# Patient Record
Sex: FEMALE | Race: BLACK OR AFRICAN AMERICAN | Employment: UNEMPLOYED | URBAN - METROPOLITAN AREA
[De-identification: names, ages, dates, MRNs, and addresses within clinical notes are randomized per-mention and may not be internally consistent; named-entity substitution may affect disease eponyms.]

---

## 2017-05-30 ENCOUNTER — HOSPITAL ENCOUNTER (OUTPATIENT)
Dept: PHYSICAL THERAPY | Age: 57
Discharge: HOME | End: 2017-05-30
Payer: COMMERCIAL

## 2017-05-30 PROCEDURE — 97110 THERAPEUTIC EXERCISES: CPT

## 2017-05-30 PROCEDURE — 97162 PT EVAL MOD COMPLEX 30 MIN: CPT

## 2017-05-30 NOTE — PROGRESS NOTES
PT DAILY TREATMENT NOTE/CERVICAL EVAL3-16    Patient Name: Luciano Koch  Date:2017  : 1960  [x]  Patient  Verified  Payor: Juany Johansen / Plan: [employer] / Product Type: HMO /    In time:1210 Out time:1252  Total Treatment Time (min): 42  Total Timed Codes (min): 8  1:1 Treatment Time ( only): 8   Visit #: 1 of 10    Treatment Area: Right arm pain [M79.601]  Right hand pain [M79.641]    SUBJECTIVE  Pain Level (0-10 scale): 0 pain, however CCO R hand weakness  []constant []intermittent []improving [x]worsening []no change since onset    Any medication changes, allergies to medications, adverse drug reactions, diagnosis change, or new procedure performed?: [x] No    [] Yes (see summary sheet for update)  Subjective functional status/changes:     PLOF:I  Limitations to PLOF: decreased strength in the R hand  Mechanism of Injury:insideous onset, approx 1 month ago. 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 Current symptoms/Complaints: 63 YO female diagnosed as above and with S/S consistent with above diagnosis presents to skilled outpatient PT. CCO no strength in the R hand and she is R hand dominant. She reports CCO tingling /burning in the R arm posteriorly along the tricep to the hand, with CCO no strength and weak in the hand. Numbness in the digits 4 and 5. She reports insideous onset, approx 1 month ago. 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 Overall worsening since onset. Notes with overhead elevation it does improve the numbness and tingling. Previous Treatment/Compliance: PCP, neurologist, now PT. She has no diagnostics done and has an EMG/NCV pending 17 to rule out ulnar nerve V. C8 radiculopathy.   PMHx/Surgical Hx: - Csp, + DM, + HTN  Work Hx: full time for [employer] and is a  and needs to tolerate working with the children  Living Situation:lives in a 1 story house with front steps 4 and with a rail, lives with family  Pt Goals: no pain irritation , wish to accomplish using right hand  Barriers: []pain []financial []time []transportation [x]other weakness  Motivation: high  Substance use: []Alcohol []Tobacco []other:   FABQ Score: []low []elevate  Cognition: A & O x 4  Other:    OBJECTIVE/EXAMINATION  Domestic Life:needs to tolerate work, household chores  Activity/Recreational Limitations: weakness  Mobility: I   Self Care: I some difficulty due to R hand weakness        Modality rationale:     Min Type Additional Details    [] Estim:  []Unatt       []IFC  []Premod                        []Other:  []w/ice   []w/heat  Position:  Location:    [] Estim: []Att    []TENS instruct  []NMES                    []Other:  []w/US   []w/ice   []w/heat  Position:  Location:    []  Traction: [] Cervical       []Lumbar                       [] Prone          []Supine                       []Intermittent   []Continuous Lbs:  [] before manual  [] after manual    []  Ultrasound: []Continuous   [] Pulsed                           []1MHz   []3MHz Location:  W/cm2:    []  Iontophoresis with dexamethasone         Location: [] Take home patch   [] In clinic    []  Ice     []  heat  []  Ice massage  []  Laser   []  Anodyne Position:  Location:    []  Laser with stim  []  Other: Position:  Location:    []  Vasopneumatic Device Pressure:       [] lo [] med [] hi   Temperature: [] lo [] med [] hi   [] Skin assessment post-treatment:  []intact []redness- no adverse reaction    []redness  adverse reaction:     34 min [x]Eval                  []Re-Eval       8 min Therapeutic Exercise:  [x] See flow sheet :   Rationale: increase ROM, increase strength and improve coordination to improve the patients ability to aid with increase tolerance to ADLS and activities     min Therapeutic Activity:  []  See flow sheet :   Rationale: to improve the patients ability to       min Neuromuscular Re-education:  []  See flow sheet :   Rationale:   to improve the patients ability to      min Manual Therapy:     Rationale:  to      min Gait Training:  ___ feet with ___ device on level surfaces with ___ level of assist   Rationale: With   [] TE   [] TA   [] neuro   [] other: Patient Education: [x] Review HEP    [] Progressed/Changed HEP based on:   [] positioning   [] body mechanics   [] transfers   [] heat/ice application    [] other:      Other Objective/Functional Measures:     Physical Therapy Evaluation Cervical Spine     SUBJECTIVE  Chief Complaint:    Mechanism of injury:    Symptoms  Aggravated by:   [] Bending [] Sitting [] Standing [] Reaching Overhead   [] Moving [] Cough [] Sneeze [] Eating   [] AM  [] PM  Lying:  [] sup   [] pro   [] sidelying   [] Other:     Eased by:    [] Bending [] Sitting [] Standing Lying: [] sup  [] pro  [] sidelying   [] Moving [] AM  [] PM  [] Other:     General Health:  Red Flags Indicated? [] Yes    [] No  [] Yes [] No Recent weight change (If yes, due to dieting? [] Yes  [] No)   [] Yes [] No Persistent cough  [] Yes [] No Unremitting pain at night  [] Yes [] No Dizziness  [] Yes [] No Blurred vision  [] Yes [] No Hands more cold or painful in cold weather  [] Yes [] No Ringing in ears  [] Yes [] No Difficulty swallowing  [] Yes [] No Dysfunction of bowel or bladder  [] Yes [] No Recent illness within past 3 weeks (i.e, cold, flu)  [] Yes [] No Jaw pain    Past History/Treatments:    Diagnostic Tests: [] Lab work [] X-rays    [] CT [] MRI     [] Other:  Results:    Functional Status  Prior level of function:  Present functional limitations:  What position do you sleep in?:    Headaches: Do you have headaches? [] Yes   [x] No  How often do you get headaches? How long does the headache last?  What aggravates it? What relieves it?   Does the headache coincide with any other symptoms (visual disturbances, light sensitivity)? Where is the headache? Does it change locations?   Other:    OBJECTIVE  Posture: [] WNL  Head Position:mild FH and RS  Shoulder/Scapular Position:  C-Kyphosis:  [] increased   [] decreased   C-Lordosis:   [] increased   [] decreased  T-Kyphosis:  [] increased   [] decreased  T-Lordosis:   [] increased   [] decreased     TMJ: [x] N/A [] Abnormal - ROM:   Palpation:    Cervical Retraction: [x] WNL    [] Abnormal:  NBNW re: R hand and UE involvement  Shoulder/Scapular Screen: [] WNL    [] Abnormal:    Active Movements: [] N/A   [] Too acute   [] Other:  ROM  AROM % PROM Comments:pain, area   Forward flexion 25     Extension 30     SB right 32     SB left  40     Rotation right 50     Rotation left 55       Thoracic Spine: [x] N/A    [] WNL   [] Other:    AROM B Shoulders full overhead    R  SHOULDER F 150            MMT   F  R/L 4+/5       ABD  R/L 4+/5    Palpation:  [] Min  [] Mod  [] Severe    Location:  [] Min  [] Mod  [] Severe    Location:  [] Min  [] Mod  [] Severe    Location:    Neuro Screen (myotome/dematome/felexes): [] WNL  Myotome Level Muscle Test Myotome Level Muscle Test   C5 Shoulder Adduction - Deltoid C8 Finger Flexors   C6 Wrist Extension T1 Finger Abduction - Interossei   C7 Elbow Extension     Comments:  Upper Limb Tension Tests: [] N/A       Ulnar: [] R    [] L    [] +    [] -       Median: [] R    [] L    [] +    [] -       Radial: [] R    [] L    [] +    [] -    Special Tests:  Cervical:        Vertebral Artery:  [] R    [] L    [] +    [] -       Alar Ligament: [] R    [] L    [] +    [] -       Transverse Lig: [] R    [] L    [] +    [] -       Spurling's:  [] R    [] L    [] +    [] -       Distraction: [] R    [] L    [x] +    [] -      BAR       Compression: [] R    [] L    [x] +    [] -      WAR    Thoracic Outlet Tests: [] N/A       Adson's:  [] R    [] L    [] +    [] -       Hyperabduction: [] R    [] L    [] +    [] -       Maurice's:  [] R [] L    [] +    [] -       Orvilla Pean:  [] R    [] L    [] +    [] -    Diaphragmatic Breathing: [] Normal    [] Abnormal    Muscle Flexibility: [] N/A   Scalenes: [] WNL    [] Tight    [] R    [] L   Upper Trap: [] WNL    [] Tight    [] R    [] L   Levator: [] WNL    [] Tight    [] R    [] L   Pect. Minor: [] WNL    [] Tight    [] R    [] L    Global Muscular Weakness: [] N/A   Lower Trap:   Rhomboids:   Middle Trap:   Serratus Ant:   Ext Rotators: Other:    Other tests/comments:     In pounds    Level 2  R   12   16  15   avg 14                        L  34  29 36      avg 33    Pain Level (0-10 scale) post treatment: 0    ASSESSMENT/Changes in Function: Patient demonstrates the potential to make gains with improved ROM, strength, endurance/activity tolerance, functional FOTO survey score   and all within a reasonable time frame so as to increase their functional independence with ADLs and activities for carryover to  Improved quality of life, work demands, household chores. Patient requires skilled Physical Therapy so as to monitor their response to and modify their treatment plan accordingly. Patient appears to be an appropriate candidate for skilled outpatient Physical Therapy. Patient will continue to benefit from skilled PT services to modify and progress therapeutic interventions, address functional mobility deficits, address ROM deficits, address strength deficits, analyze and address soft tissue restrictions, analyze and cue movement patterns, analyze and modify body mechanics/ergonomics, assess and modify postural abnormalities and instruct in home and community integration to attain remaining goals.      [x]  See Plan of Care  []  See progress note/recertification  []  See Discharge Summary         Progress towards goals / Updated goals:       PLAN  [x]  Upgrade activities as tolerated     [x]  Continue plan of care  []  Update interventions per flow sheet       []  Discharge due to:_  []  Other:_ Prerna Rea, PT 5/30/2017  12:14 PM

## 2017-05-30 NOTE — PROGRESS NOTES
[facility]  [address] [facility], [address] [address],[place]  [phone] fax    Plan of Care/ Statement of Necessity for Physical Therapy Services    Patient name: Warren Hook Start of Care: 2017   Referral source: Fay Price MD : 1960    Medical Diagnosis: Right arm pain [M79.601]  Right hand pain [M79.641]   Onset Date:1 month    Treatment Diagnosis: decrease tolerance to ADLS and activities due to R UE tingling/ burning R UE and numbness/weakness in the R hand   Prior Hospitalization: see medical history Provider#: [id number]   Medications: Verified on Patient summary List    Comorbidities: DM, HTN   Prior Level of Function:  I all areas, no AD use, 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 Assessment/ key information: 63 YO female diagnosed as above and with S/S consistent with above diagnosis presents to skilled outpatient PT. CCO no strength in the R hand and she is R hand dominant. She reports CCO tingling /burning in the R arm posteriorly along the tricep to the hand, with CCO no strength and weak in the hand. Numbness in the digits 4 and 5. She reports insideous onset, approx 1 month ago. 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 Overall worsening since onset. Notes with overhead elevation it does improve the numbness and tingling. Previous Treatment/Compliance: PCP, neurologist, now PT. She has no diagnostics done and has an EMG/NCV pending 17 to rule out ulnar nerve V. C8 radiculopathy. Pain 0, CCO tingling /burning in the R arm posteriorly along the tricep to the hand, with CCO no strength and weak in the hand. Numbness in the digits 4 and 5. Mild FH and RS, AROM Csp FF 25, E 30, SB R/L 32/40  ROT R/L 50/55.  Csp Retraction NBNW re R hand and UE involvement. AROM B Shoulders full overhead R  SHOULDER F 150  MMT F R/L 4+/5 ABD R/L 4+/5. Csp Compression WAR and Distraction BAR. Patient demonstrates the potential to make gains with improved ROM, strength, endurance/activity tolerance, functional FOTO survey score and all within a reasonable time frame so as to increase their functional independence with ADLs and activities for carryover to Improved quality of life, work demands, household chores. Patient requires skilled Physical Therapy so as to monitor their response to and modify their treatment plan accordingly. Patient appears to be an appropriate candidate for skilled outpatient Physical Therapy.        Evaluation Complexity History MEDIUM  Complexity : 1-2 comorbidities / personal factors will impact the outcome/ POC ; Examination HIGH Complexity : 4+ Standardized tests and measures addressing body structure, function, activity limitation and / or participation in recreation  ;Presentation MEDIUM Complexity : Evolving with changing characteristics  ; Clinical Decision Making MEDIUM Complexity : FOTO score of 26-74  Overall Complexity Rating: MEDIUM  Problem List: decrease ROM, decrease strength, decrease ADL/ functional abilitiies, decrease activity tolerance and other FOTO 32   Treatment Plan may include any combination of the following: Therapeutic exercise, Therapeutic activities, Neuromuscular re-education, Physical agent/modality, Manual therapy and Patient education  Patient / Family readiness to learn indicated by: asking questions, trying to perform skills and interest  Persons(s) to be included in education: patient (P)  Barriers to Learning/Limitations: None  Patient Goal (s): no pain irritation , wish to accomplish using right hand  Patient Self Reported Health Status: fair  Rehabilitation Potential: good    Short Term Goals:  To be accomplished in 5 treatments:   1 patient will have established and be independent with HEP to aid with progression of skilled PT program   EVAL issued   CURRENT   2 patient will report 10-20% overall improvement with her involvement to aid with increase tolerance to ADLs and activities   EVAL C/O numbness/ weakness R hand and burning/ tingling R tricep   CURRENT   3 patient will have FOTO improved to 42 to show increase function and use of R UE with ADLS and work demands   EVAL 32   CURRENT  Long Term Goals: To be accomplished in 10 treatments:   1 patient will report 50% overall improvement with her involvement to aid with increase tolerance to ADLs and activities   EVAL C/O numbness/ weakness R hand and burning/ tingling R tricep   CURRENT   2 patient will have FOTO improved to 52 to show increase function and use of R UE with ADLS and work demands   EVAL 32   CURRENT   3 patient will have improved R  to 20# to aid with increase use of R UE with ADLs and work demands   EVAL 14#   CURRENT      Frequency / Duration: Patient to be seen 2-3 times per week for 10 treatments. Patient/ Caregiver education and instruction: Diagnosis, prognosis, self care, activity modification and exercises   [x]  Plan of care has been reviewed with CLOVER Tirado, PT 5/30/2017 12:42 PM  ________________________________________________________________________    I certify that the above Therapy Services are being furnished while the patient is under my care. I agree with the treatment plan and certify that this therapy is necessary.     Physician's Signature:____________________  Date:____________Time: _________    Please sign and return to [facility] [address] [place] [address], [address] [place] [address],[place] [address]  [phone] fax

## 2017-06-01 ENCOUNTER — HOSPITAL ENCOUNTER (OUTPATIENT)
Dept: PHYSICAL THERAPY | Age: 57
Discharge: HOME | End: 2017-06-01
Payer: COMMERCIAL

## 2017-06-01 PROCEDURE — 97140 MANUAL THERAPY 1/> REGIONS: CPT

## 2017-06-01 PROCEDURE — 97110 THERAPEUTIC EXERCISES: CPT

## 2017-06-01 NOTE — PROGRESS NOTES
PT DAILY TREATMENT NOTE - [facility]     Patient Name: Shorty Joshi  Date:2017  : 1960  [x]  Patient  Verified  Payor: Salvador Pizarro / Plan: [employer] / Product Type: HMO /    In time: 1:57  Out time:2:43  Total Treatment Time (min): 42  Visit #: 2 of 10    Treatment Area: Right arm pain [M79.601]  Right hand pain [M79.641]    SUBJECTIVE  Pain Level (0-10 scale): 0  Any medication changes, allergies to medications, adverse drug reactions, diagnosis change, or new procedure performed?: [x] No    [] Yes (see summary sheet for update)  Subjective functional status/changes:   [] No changes reported  Tingling  (R)  Hand. No pain.     OBJECTIVE    Modality rationale: decrease edema, decrease inflammation, decrease pain and increase tissue extensibility to improve the patients ability to perform ADL   Min Type Additional Details    [] Estim:  []Unatt       []IFC  []Premod                        []Other:  []w/ice   []w/heat  Position:  Location:    [] Estim: []Att    []TENS instruct  []NMES                    []Other:  []w/US   []w/ice   []w/heat  Position:  Location:    []  Traction: [] Cervical       []Lumbar                       [] Prone          []Supine                       []Intermittent   []Continuous Lbs:  [] before manual  [] after manual    []  Ultrasound: []Continuous   [] Pulsed                           []1MHz   []3MHz W/cm2:  Location:    []  Iontophoresis with dexamethasone         Location: [] Take home patch   [] In clinic    []  Ice     []  heat  []  Ice massage  []  Laser   []  Anodyne Position:  Location:    []  Laser with stim  []  Other:  Position:  Location:    []  Vasopneumatic Device Pressure:       [] lo [] med [] hi   Temperature: [] lo [] med [] hi   [x] Skin assessment post-treatment:  [x]intact []redness- no adverse reaction    []redness  adverse reaction:      min []Eval                  []Re-Eval       32 min Therapeutic Exercise:  [x] See flow sheet : Rationale: increase ROM and increase strength to improve the patients ability to perform ADL     min Therapeutic Activity:  []  See flow sheet :   Rationale:   to improve the patients ability to       min Neuromuscular Re-education:  []  See flow sheet :   Rationale:   to improve the patients ability to     10 min Manual Therapy:  CSP  TX  With passive  Stretch  (B)UT   Rationale: decrease pain, increase ROM, increase tissue extensibility and decrease edema  to perform ADL      min Gait Training:  ___ feet with ___ device on level surfaces with ___ level of assist   Rationale: With   [x] TE   [] TA   [] neuro   [] other: Patient Education: [x] Review HEP    [] Progressed/Changed HEP based on:   [] positioning   [] body mechanics   [] transfers   [] heat/ice application    [] other:      Other Objective/Functional Measures:  Tingling  (R) hand  Was resolved  Following treatment. Pain Level (0-10 scale) post treatment: 0    ASSESSMENT/Changes in Function: Fair  Response  To each there ex. Discussed  With  Pt on importance  Of  Performing  HEP. Patient will continue to benefit from skilled PT services to address functional mobility deficits, address ROM deficits, address strength deficits, analyze and address soft tissue restrictions, analyze and cue movement patterns and instruct in home and community integration to attain remaining goals. [x]  See Plan of Care  []  See progress note/recertification  []  See Discharge Summary         Progress towards goals / Updated goals:  Short Term Goals:  To be accomplished in 5 treatments:  1 patient will have established and be independent with HEP to aid with progression of skilled PT program  EVAL issued  CURRENT progressing  6/1/17  2 patient will report 10-20% overall improvement with her involvement to aid with increase tolerance to ADLs and activities  EVAL C/O numbness/ weakness R hand and burning/ tingling R tricep  CURRENT  3 patient will have FOTO improved to 42 to show increase function and use of R UE with ADLS and work demands  EVAL 32  CURRENT  Long Term Goals:  To be accomplished in 10 treatments:  1 patient will report 50% overall improvement with her involvement to aid with increase tolerance to ADLs and activities  EVAL C/O numbness/ weakness R hand and burning/ tingling R tricep  CURRENT  2 patient will have FOTO improved to 52 to show increase function and use of R UE with ADLS and work demands  EVAL 32  CURRENT  3 patient will have improved R  to 20# to aid with increase use of R UE with ADLs and work demands  EVAL 14#  CURRENT    PLAN  []  Upgrade activities as tolerated     [x]  Continue plan of care  []  Update interventions per flow sheet       []  Discharge due to:_  []  Other:_      Marleny García, PTA 6/1/2017  1:54 PM    Future Appointments  Date Time Provider Brenda Randle   6/1/2017 2:30 PM Crystal Say, PTA [facility]   6/5/2017 9:00 AM [facility] RM 1 [facility]   6/7/2017 2:30 PM Crystal Say, PTA [facility]   6/8/2017 2:00 PM Crystal Say, PTA [facility]   6/13/2017 3:00 PM Kelsy Lions, PT [facility]   6/15/2017 2:30 PM Kelsy Lions, PT [facility]   6/19/2017 2:30 PM [facility]els[facility] Lions, PT [facility]   6/22/2017 3:00 PM Kelsy Lions, PT [facility]   6/26/2017 2:30 PM Eklsy Lions, PT [facility]   6/29/2017 3:00 PM Kelsy Lions, PT [facility]

## 2017-06-05 ENCOUNTER — HOSPITAL ENCOUNTER (OUTPATIENT)
Age: 57
Discharge: HOME | End: 2017-06-05
Attending: PSYCHIATRY & NEUROLOGY
Payer: COMMERCIAL

## 2017-06-05 DIAGNOSIS — M54.12 CERVICAL RADICULOPATHY AT C8: ICD-10-CM

## 2017-06-05 PROCEDURE — 72141 MRI NECK SPINE W/O DYE: CPT

## 2017-06-07 ENCOUNTER — HOSPITAL ENCOUNTER (OUTPATIENT)
Dept: PHYSICAL THERAPY | Age: 57
Discharge: HOME | End: 2017-06-07
Payer: COMMERCIAL

## 2017-06-07 PROCEDURE — 97110 THERAPEUTIC EXERCISES: CPT

## 2017-06-07 PROCEDURE — 97140 MANUAL THERAPY 1/> REGIONS: CPT

## 2017-06-07 NOTE — PROGRESS NOTES
PT DAILY TREATMENT NOTE - [facility]     Patient Name: Colin Avery  Date:2017  : 1960  [x]  Patient  Verified  Payor: Maria Luz Katie / Plan: [employer] / Product Type: HMO /    In time: 2:14  Out time:3:15  Total Treatment Time (min): 42  Visit #: 3 of 10    Treatment Area: Right arm pain [M79.601]  Right hand pain [M79.641]    SUBJECTIVE  Pain Level (0-10 scale): 0  Any medication changes, allergies to medications, adverse drug reactions, diagnosis change, or new procedure performed?: [x] No    [] Yes (see summary sheet for update)  Subjective functional status/changes:   [] No changes reported  Numbness.     OBJECTIVE    Modality rationale: decrease edema, decrease inflammation, decrease pain and increase tissue extensibility to improve the patients ability to perform ADL    Min Type Additional Details    [] Estim:  []Unatt       []IFC  []Premod                        []Other:  []w/ice   []w/heat  Position:  Location:    [] Estim: []Att    []TENS instruct  []NMES                    []Other:  []w/US   []w/ice   []w/heat  Position:  Location:    []  Traction: [] Cervical       []Lumbar                       [] Prone          []Supine                       []Intermittent   []Continuous Lbs:  [] before manual  [] after manual    []  Ultrasound: []Continuous   [] Pulsed                           []1MHz   []3MHz W/cm2:  Location:    []  Iontophoresis with dexamethasone         Location: [] Take home patch   [] In clinic    []  Ice     []  heat  []  Ice massage  []  Laser   []  Anodyne Position:  Location:    []  Laser with stim  []  Other:  Position:  Location:    []  Vasopneumatic Device Pressure:       [] lo [] med [] hi   Temperature: [] lo [] med [] hi   [x] Skin assessment post-treatment:  [x]intact []redness- no adverse reaction    []redness  adverse reaction:      min []Eval                  []Re-Eval       30 min Therapeutic Exercise:  [x] See flow sheet :   Rationale: increase ROM and increase strength to improve the patients ability to perform ADL       min Therapeutic Activity:  []  See flow sheet :   Rationale:   to improve the patients ability to       min Neuromuscular Re-education:  []  See flow sheet :   Rationale:   to improve the patients ability to     12 min Manual Therapy:  CSP TX  With passive  Stretch  (B) UT   Rationale: decrease pain, increase ROM, increase tissue extensibility and decrease edema  to perform ADL     min Gait Training:  ___ feet with ___ device on level surfaces with ___ level of assist   Rationale: With   [x] TE   [] TA   [] neuro   [] other: Patient Education: [x] Review HEP    [] Progressed/Changed HEP based on:   [] positioning   [] body mechanics   [] transfers   [] heat/ice application    [] other:      Other Objective/Functional Measures:  Pt  Reports  Numbness  Is  Still  The same. Pain Level (0-10 scale) post treatment: 0    ASSESSMENT/Changes in Function: Fair  Response  To each there ex. Numbness  And  Tingling  Was resolved  Following  Treatment. Patient will continue to benefit from skilled PT services to address functional mobility deficits, address ROM deficits, address strength deficits, analyze and address soft tissue restrictions, analyze and cue movement patterns and instruct in home and community integration to attain remaining goals.      [x]  See Plan of Care  []  See progress note/recertification  []  See Discharge Summary         Progress towards goals / Updated goals:  1 patient will have established and be independent with HEP to aid with progression of skilled PT program  EVAL issued  CURRENT progressing 6/1/17  2 patient will report 10-20% overall improvement with her involvement to aid with increase tolerance to ADLs and activities  EVAL C/O numbness/ weakness R hand and burning/ tingling R tricep  CURRENT  3 patient will have FOTO improved to 42 to show increase function and use of R UE with ADLS and work demands  EVAL 32  CURRENT  Long Term Goals:  To be accomplished in 10 treatments:  1 patient will report 50% overall improvement with her involvement to aid with increase tolerance to ADLs and activities  EVAL C/O numbness/ weakness R hand and burning/ tingling R tricep  CURRENT  Progressing  6/7/17  2 patient will have FOTO improved to 52 to show increase function and use of R UE with ADLS and work demands  EVAL 32  CURRENT  3 patient will have improved R  to 20# to aid with increase use of R UE with ADLs and work demands  EVAL 14#  CURRENT    PLAN  []  Upgrade activities as tolerated     [x]  Continue plan of care  []  Update interventions per flow sheet       []  Discharge due to:_  []  Other:_      Marleny García PTA 6/7/2017  2:19 PM    Future Appointments  Date Time Provider Brenda Randle   6/7/2017 2:30 PM Payton Jose PTA [facility]   6/8/2017 2:00 PM Payton Deal PTA [facility]   6/13/2017 3:00 PM Arron Smart, PT [facility]   6/15/2017 2:30 PM Arron Smart, PT [facility]   6/19/2017 2:30 PM Arron Smart, PT [facility]   6/22/2017 3:00 PM Arron Smart, PT [facility]   6/26/2017 2:30 PM Arron Smart, PT [facility]   6/29/2017 3:00 PM Arron Smart, PT [facility]

## 2017-06-08 ENCOUNTER — HOSPITAL ENCOUNTER (OUTPATIENT)
Dept: PHYSICAL THERAPY | Age: 57
Discharge: HOME | End: 2017-06-08
Payer: COMMERCIAL

## 2017-06-08 PROCEDURE — 97140 MANUAL THERAPY 1/> REGIONS: CPT

## 2017-06-08 PROCEDURE — 97110 THERAPEUTIC EXERCISES: CPT

## 2017-06-08 NOTE — PROGRESS NOTES
PT DAILY TREATMENT NOTE - [facility]     Patient Name: Dk Yancey  Date:2017  : 1960  [x]  Patient  Verified  Payor: Franklin Turner / Plan: [employer] HMO / Product Type: HMO /    In time:2:00  Out time:2:45  Total Treatment Time (min): 40    Visit #: 4 of 10    Treatment Area: Right arm pain [M79.601]  Right hand pain [M79.641]    SUBJECTIVE  Pain Level (0-10 scale): 0  Any medication changes, allergies to medications, adverse drug reactions, diagnosis change, or new procedure performed?: [x] No    [] Yes (see summary sheet for update)  Subjective functional status/changes:   [] No changes reported  No pain . Little  Numbness.     OBJECTIVE    Modality rationale: decrease edema, decrease inflammation, decrease pain and increase tissue extensibility to improve the patients ability to perform ADL   Min Type Additional Details    [] Estim:  []Unatt       []IFC  []Premod                        []Other:  []w/ice   []w/heat  Position:  Location:    [] Estim: []Att    []TENS instruct  []NMES                    []Other:  []w/US   []w/ice   []w/heat  Position:  Location:    []  Traction: [] Cervical       []Lumbar                       [] Prone          []Supine                       []Intermittent   []Continuous Lbs:  [] before manual  [] after manual    []  Ultrasound: []Continuous   [] Pulsed                           []1MHz   []3MHz W/cm2:  Location:    []  Iontophoresis with dexamethasone         Location: [] Take home patch   [] In clinic    []  Ice     []  heat  []  Ice massage  []  Laser   []  Anodyne Position:  Location:    []  Laser with stim  []  Other:  Position:  Location:    []  Vasopneumatic Device Pressure:       [] lo [] med [] hi   Temperature: [] lo [] med [] hi   [x] Skin assessment post-treatment:  [x]intact []redness- no adverse reaction    []redness  adverse reaction:      min []Eval                  []Re-Eval       28 min Therapeutic Exercise:  [x] See flow sheet : Rationale: increase ROM and increase strength to improve the patients ability to perform ADL      min Therapeutic Activity:  []  See flow sheet :   Rationale:   to improve the patients ability to       min Neuromuscular Re-education:  []  See flow sheet :   Rationale:   to improve the patients ability to     12 min Manual Therapy:  CSP TX  With passive  Stretch (B) UT   Rationale: decrease pain, increase ROM, increase tissue extensibility and decrease edema  to perform ADL      min Gait Training:  ___ feet with ___ device on level surfaces with ___ level of assist   Rationale: With   [x] TE   [] TA   [] neuro   [] other: Patient Education: [x] Review HEP    [] Progressed/Changed HEP based on:   [] positioning   [] body mechanics   [] transfers   [] heat/ice application    [] other:      Other Objective/Functional Measures:  Gripper  15#    Pain Level (0-10 scale) post treatment: 0    ASSESSMENT/Changes in Function:  Reports  Numbness  Is  5% less at  (R) arm/hand/tricep. Fair  Response  To each there ex. Patient will continue to benefit from skilled PT services to address functional mobility deficits, address ROM deficits, address strength deficits, analyze and address soft tissue restrictions, analyze and cue movement patterns and instruct in home and community integration to attain remaining goals.      [x]  See Plan of Care  []  See progress note/recertification  []  See Discharge Summary         Progress towards goals / Updated goals:  1 patient will have established and be independent with HEP to aid with progression of skilled PT program  EVAL issued  CURRENT progressing 6/1/17  2 patient will report 10-20% overall improvement with her involvement to aid with increase tolerance to ADLs and activities  EVAL C/O numbness/ weakness R hand and burning/ tingling R tricep  CURRENT  3 patient will have FOTO improved to 42 to show increase function and use of R UE with ADLS and work demands  EVAL 28  CURRENT  Long Term Goals:  To be accomplished in 10 treatments:  1 patient will report 50% overall improvement with her involvement to aid with increase tolerance to ADLs and activities  EVAL C/O numbness/ weakness R hand and burning/ tingling R tricep  CURRENT Progressing    5%  6/8/17  2 patient will have FOTO improved to 52 to show increase function and use of R UE with ADLS and work demands  EVAL 32  CURRENT  3 patient will have improved R  to 20# to aid with increase use of R UE with ADLs and work demands  EVAL 14#  CURRENT  PLAN  []  Upgrade activities as tolerated     [x]  Continue plan of care  []  Update interventions per flow sheet       []  Discharge due to:_  [x]  Other:_REASSESS  FOTO NV      [address] [facility] 6/8/2017  2:05 PM    Future Appointments  Date Time Provider Brenda Randle   6/13/2017 3:00 PM Marsha Brewster PT [facility]   6/15/2017 2:30 PM Marsha Brewster, PT [facility]   6/19/2017 2:30 PM Marsha Brewster PT [facility]   6/22/2017 3:00 PM Marsha Brewster PT [facility]   6/26/2017 2:30 PM Marsha Brewster PT [facility]   6/29/2017 3:00 PM Marsha Brewster PT [facility]

## 2017-06-13 ENCOUNTER — HOSPITAL ENCOUNTER (OUTPATIENT)
Dept: PHYSICAL THERAPY | Age: 57
Discharge: HOME | End: 2017-06-13
Payer: COMMERCIAL

## 2017-06-13 PROCEDURE — 97140 MANUAL THERAPY 1/> REGIONS: CPT

## 2017-06-13 PROCEDURE — 97110 THERAPEUTIC EXERCISES: CPT

## 2017-06-13 NOTE — PROGRESS NOTES
PT DAILY TREATMENT NOTE     Patient Name: Leonila Angela  Date:2017  : 1960  [x]  Patient  Verified  Payor: Anthony Holt / Plan: [employer] / Product Type: HMO /    In time:253  Out time:400  Total Treatment Time (min): 47  Visit #:5 of 10    Treatment Area: Right arm pain [M79.601]  Right hand pain [M79.641]    SUBJECTIVE  Pain Level (0-10 scale): numb  Any medication changes, allergies to medications, adverse drug reactions, diagnosis change, or new procedure performed?: [x] No    [] Yes (see summary sheet for update)  Subjective functional status/changes:   [] No changes reported  I am still having numbness in the R arm/hand.     OBJECTIVE    Modality rationale:     Min Type Additional Details    [] Estim:  []Unatt       []IFC  []Premod                        []Other:  []w/ice   []w/heat  Position:  Location:    [] Estim: []Att    []TENS instruct  []NMES                    []Other:  []w/US   []w/ice   []w/heat  Position:  Location:    []  Traction: [] Cervical       []Lumbar                       [] Prone          []Supine                       []Intermittent   []Continuous Lbs:  [] before manual  [] after manual    []  Ultrasound: []Continuous   [] Pulsed                           []1MHz   []3MHz W/cm2:  Location:    []  Iontophoresis with dexamethasone         Location: [] Take home patch   [] In clinic    []  Ice     []  heat  []  Ice massage  []  Laser   []  Anodyne Position:  Location:    []  Laser with stim  []  Other:  Position:  Location:    []  Vasopneumatic Device Pressure:       [] lo [] med [] hi   Temperature: [] lo [] med [] hi   [] Skin assessment post-treatment:  []intact []redness- no adverse reaction    []redness  adverse reaction:       min []Eval                  []Re-Eval       32 min Therapeutic Exercise:  [x] See flow sheet :   Rationale: increase ROM, increase strength and improve coordination to improve the patients ability to aid with increase tolerance to ADLS and activities     min Therapeutic Activity:  []  See flow sheet :   Rationale:   to improve the patients ability to       min Neuromuscular Re-education:  []  See flow sheet :   Rationale:   to improve the patients ability to     15 min Manual Therapy:  CTX ,SOR, Csp PROM   Rationale: increase ROM, increase tissue extensibility and decrease trigger points to aid with increase tolerance to ADLS and activities     min Gait Training:  ___ feet with ___ device on level surfaces with ___ level of assist   Rationale: With   [] TE   [] TA   [] neuro   [] other: Patient Education: [x] Review HEP    [] Progressed/Changed HEP based on:   [] positioning   [] body mechanics   [] transfers   [] heat/ice application    [] other:      Other Objective/Functional Measures: VC exercises and technique      FOTO 41  Pain Level (0-10 scale) post treatment: residual numbness R UE/hand    ASSESSMENT/Changes in Function: she reports benefit from CTx and SOR    Patient will continue to benefit from skilled PT services to modify and progress therapeutic interventions, address functional mobility deficits, address ROM deficits, address strength deficits, analyze and address soft tissue restrictions, analyze and cue movement patterns, analyze and modify body mechanics/ergonomics, assess and modify postural abnormalities and instruct in home and community integration to attain remaining goals.      [x]  See Plan of Care  []  See progress note/recertification  []  See Discharge Summary         Progress towards goals / Updated goals:     1 patient will have established and be independent with HEP to aid with progression of skilled PT program  EVAL issued  CURRENT progressing 6/1/17 6/13/17  2 patient will report 10-20% overall improvement with her involvement to aid with increase tolerance to ADLs and activities  EVAL C/O numbness/ weakness R hand and burning/ tingling R tricep  CURRENT  3 patient will have FOTO improved to 42 to show increase function and use of R UE with ADLS and work demands  EVAL 32  CURRENT   41 6/13/17  Long Term Goals:  To be accomplished in 10 treatments:  1 patient will report 50% overall improvement with her involvement to aid with increase tolerance to ADLs and activities  EVAL C/O numbness/ weakness R hand and burning/ tingling R tricep  CURRENT Progressing 5%  6/8/17  2 patient will have FOTO improved to 52 to show increase function and use of R UE with ADLS and work demands  EVAL 32  CURRENT   41 6/13/17  3 patient will have improved R  to 20# to aid with increase use of R UE with ADLs and work demands  EVAL 14#  [address]  [x]  Upgrade activities as tolerated     [x]  Continue plan of care  []  Update interventions per flow sheet       []  Discharge due to:_  []  Other:_      Laura Bryant, PT 6/13/2017  3:03 PM    Future Appointments  Date Time Provider Brenda Randle   6/15/2017 2:30 PM Laura Ast, PT [facility]   6/19/2017 2:30 PM Laura Ast, PT [facility]   6/22/2017 3:00 PM Payton Jose, PTA [facility]   6/26/2017 2:30 PM Payton Deal, PTA [facility]   6/29/2017 3:00 PM Laura Bryant, PT [facility]

## 2017-06-15 ENCOUNTER — APPOINTMENT (OUTPATIENT)
Dept: PHYSICAL THERAPY | Age: 57
End: 2017-06-15
Payer: COMMERCIAL

## 2017-06-19 ENCOUNTER — HOSPITAL ENCOUNTER (OUTPATIENT)
Dept: PHYSICAL THERAPY | Age: 57
Discharge: HOME | End: 2017-06-19
Payer: COMMERCIAL

## 2017-06-19 PROCEDURE — 97140 MANUAL THERAPY 1/> REGIONS: CPT

## 2017-06-19 PROCEDURE — 97110 THERAPEUTIC EXERCISES: CPT

## 2017-06-19 NOTE — PROGRESS NOTES
PT DAILY TREATMENT NOTE     Patient Name: Checo Wu  Date:2017  : 1960  [x]  Patient  Verified  Payor: Alpha Landau / Plan: [employer] / Product Type: HMO /    In time:226  Out time:315  Total Treatment Time (min): 44  Visit #: 6 of 10    Treatment Area: Right arm pain [M79.601]  Right hand pain [M79.641]    SUBJECTIVE  Pain Level (0-10 scale): numb  Any medication changes, allergies to medications, adverse drug reactions, diagnosis change, or new procedure performed?: [x] No    [] Yes (see summary sheet for update)  Subjective functional status/changes:   [] No changes reported  I am still having the numbness, I go for the EMG this week.     OBJECTIVE    Modality rationale:     Min Type Additional Details    [] Estim:  []Unatt       []IFC  []Premod                        []Other:  []w/ice   []w/heat  Position:  Location:    [] Estim: []Att    []TENS instruct  []NMES                    []Other:  []w/US   []w/ice   []w/heat  Position:  Location:    []  Traction: [] Cervical       []Lumbar                       [] Prone          []Supine                       []Intermittent   []Continuous Lbs:  [] before manual  [] after manual    []  Ultrasound: []Continuous   [] Pulsed                           []1MHz   []3MHz W/cm2:  Location:    []  Iontophoresis with dexamethasone         Location: [] Take home patch   [] In clinic    []  Ice     []  heat  []  Ice massage  []  Laser   []  Anodyne Position:  Location:    []  Laser with stim  []  Other:  Position:  Location:    []  Vasopneumatic Device Pressure:       [] lo [] med [] hi   Temperature: [] lo [] med [] hi   [] Skin assessment post-treatment:  []intact []redness- no adverse reaction    []redness  adverse reaction:      min []Eval                  []Re-Eval       29 min Therapeutic Exercise:  [x] See flow sheet :   Rationale: increase ROM, increase strength and improve coordination to improve the patients ability to aid with increase tolerance to ADLS and activities     min Therapeutic Activity:  []  See flow sheet :   Rationale:   to improve the patients ability to       min Neuromuscular Re-education:  []  See flow sheet :   Rationale:   to improve the patients ability to     15 min Manual Therapy:  CTX, Csp PROM, UT passive stretch, F passive stretch   Rationale: decrease pain, increase ROM and increase tissue extensibility to aid with increase tolerance to ADLs and activities     min Gait Training:  ___ feet with ___ device on level surfaces with ___ level of assist   Rationale: With   [] TE   [] TA   [] neuro   [] other: Patient Education: [x] Review HEP    [] Progressed/Changed HEP based on:   [] positioning   [] body mechanics   [] transfers   [] heat/ice application    [] other:      Other Objective/Functional Measures: VC exercises and technique     Pain Level (0-10 scale) post treatment: 0    ASSESSMENT/Changes in Function: residual numbness in the R UE    Patient will continue to benefit from skilled PT services to modify and progress therapeutic interventions, address functional mobility deficits, address ROM deficits, address strength deficits, analyze and address soft tissue restrictions, analyze and cue movement patterns, analyze and modify body mechanics/ergonomics, assess and modify postural abnormalities and instruct in home and community integration to attain remaining goals.      [x]  See Plan of Care  []  See progress note/recertification  []  See Discharge Summary         Progress towards goals / Updated goals:   1 patient will have established and be independent with HEP to aid with progression of skilled PT program  EVAL issued  CURRENT progressing 6/1/17 6/13/17 6/19/17  2 patient will report 10-20% overall improvement with her involvement to aid with increase tolerance to ADLs and activities  EVAL C/O numbness/ weakness R hand and burning/ tingling R tricep  CURRENT  3 patient will have FOTO improved to 42 to show increase function and use of R UE with ADLS and work demands  EVAL 32  CURRENT 41 6/13/17  Long Term Goals:  To be accomplished in 10 treatments:  1 patient will report 50% overall improvement with her involvement to aid with increase tolerance to ADLs and activities  EVAL C/O numbness/ weakness R hand and burning/ tingling R tricep  CURRENT Progressing 5% 6/8/17  2 patient will have FOTO improved to 52 to show increase function and use of R UE with ADLS and work demands  EVAL 32  CURRENT 41 6/13/17  3 patient will have improved R  to 20# to aid with increase use of R UE with ADLs and work demands  EVAL 14#  [address] [facility] [address]  [x]  Upgrade activities as tolerated     [x]  Continue plan of care  []  Update interventions per flow sheet       []  Discharge due to:_  []  Other:_      Alexandra Tirado PT 6/19/2017  2:48 PM    Future Appointments  Date Time Provider Brenda Randle   6/22/2017 3:00 PM Payton Jose PTA [facility]   6/26/2017 2:30 PM Payton Deal PTA [facility]   6/29/2017 3:00 PM Alexandra Tirado PT [facility]   7/3/2017 2:30 PM Alexandra Tirado PT [facility]

## 2017-06-22 ENCOUNTER — HOSPITAL ENCOUNTER (OUTPATIENT)
Dept: PHYSICAL THERAPY | Age: 57
Discharge: HOME | End: 2017-06-22
Payer: COMMERCIAL

## 2017-06-22 PROCEDURE — 97110 THERAPEUTIC EXERCISES: CPT

## 2017-06-22 PROCEDURE — 97140 MANUAL THERAPY 1/> REGIONS: CPT

## 2017-06-22 NOTE — PROGRESS NOTES
PT DAILY TREATMENT NOTE - [facility]     Patient Name: Yair Zapien  Date:2017  : 1960  [x]  Patient  Verified  Payor: Aparna Redd / Plan: [employer] / Product Type: HMO /    In time:3:10  Out time:4:03  Total Treatment Time (min):43  Visit #: 7of 10    Treatment Area: Right arm pain [M79.601]  Right hand pain [M79.641]    SUBJECTIVE  Pain Level (0-10 scale): 0  Any medication changes, allergies to medications, adverse drug reactions, diagnosis change, or new procedure performed?: [x] No    [] Yes (see summary sheet for update)  Subjective functional status/changes:   [] No changes reported  numb    OBJECTIVE    Modality rationale: decrease edema, decrease inflammation, decrease pain and increase tissue extensibility to improve the patients ability to perform ADL    Min Type Additional Details    [] Estim:  []Unatt       []IFC  []Premod                        []Other:  []w/ice   []w/heat  Position:  Location:    [] Estim: []Att    []TENS instruct  []NMES                    []Other:  []w/US   []w/ice   []w/heat  Position:  Location:    []  Traction: [] Cervical       []Lumbar                       [] Prone          []Supine                       []Intermittent   []Continuous Lbs:  [] before manual  [] after manual    []  Ultrasound: []Continuous   [] Pulsed                           []1MHz   []3MHz W/cm2:  Location:    []  Iontophoresis with dexamethasone         Location: [] Take home patch   [] In clinic    []  Ice     []  heat  []  Ice massage  []  Laser   []  Anodyne Position:  Location:    []  Laser with stim  []  Other:  Position:  Location:    []  Vasopneumatic Device Pressure:       [] lo [] med [] hi   Temperature: [] lo [] med [] hi   [x] Skin assessment post-treatment:  [x]intact []redness- no adverse reaction    []redness  adverse reaction:      min []Eval                  []Re-Eval       33 min Therapeutic Exercise:  [x] See flow sheet :   Rationale: increase ROM and increase strength to improve the patients ability to perform ADL     min Therapeutic Activity:  []  See flow sheet :   Rationale:   to improve the patients ability to       min Neuromuscular Re-education:  []  See flow sheet :   Rationale:   to improve the patients ability to     10 min Manual Therapy:  CSP TX  With passive  Stretch  (B) UT   Rationale: decrease pain, increase ROM, increase tissue extensibility and decrease edema  to perform ADL     min Gait Training:  ___ feet with ___ device on level surfaces with ___ level of assist   Rationale: With   [x] TE   [] TA   [] neuro   [] other: Patient Education: [x] Review HEP    [] Progressed/Changed HEP based on:   [] positioning   [] body mechanics   [] transfers   [] heat/ice application    [] other:      Other Objective/Functional Measures:  Decreased  Numbness  Following  Treatment. Pain Level (0-10 scale) post treatment: 0    ASSESSMENT/Changes in Function:  C/o  Burning  In  (R) hand most  Of the time. Dicussed with pt on performing  HEP  2 x day. Patient will continue to benefit from skilled PT services to address functional mobility deficits, address ROM deficits, address strength deficits, analyze and address soft tissue restrictions, analyze and cue movement patterns and instruct in home and community integration to attain remaining goals.      [x]  See Plan of Care  []  See progress note/recertification  []  See Discharge Summary         Progress towards goals / Updated goals:   1 patient will have established and be independent with HEP to aid with progression of skilled PT program  EVAL issued  CURRENT progressing 6/22/17  2 patient will report 10-20% overall improvement with her involvement to aid with increase tolerance to ADLs and activities  EVAL C/O numbness/ weakness R hand and burning/ tingling R tricep  CURRENT   60%  Improvement   6/22/17  3 patient will have FOTO improved to 42 to show increase function and use of R UE with ADLS and work demands  EVAL 32  CURRENT 41 6/13/17  Long Term Goals:  To be accomplished in 10 treatments:  1 patient will report 50% overall improvement with her involvement to aid with increase tolerance to ADLs and activities  EVAL C/O numbness/ weakness R hand and burning/ tingling R tricep  CURRENT Progressing 5% 6/8/17  2 patient will have FOTO improved to 52 to show increase function and use of R UE with ADLS and work demands  EVAL 32  CURRENT 41 6/13/17  3 patient will have improved R  to 20# to aid with increase use of R UE with ADLs and work demands  EVAL 14#  [address]  []  Upgrade activities as tolerated     [x]  Continue plan of care  []  Update interventions per flow sheet       []  Discharge due to:_  [x]  Other:_    Strength   SHILPA Deal PTA 6/22/2017  3:38 PM    Future Appointments  Date Time Provider Brenda Randle   6/26/2017 2:30 PM [address] [facility]   6/29/2017 3:00 PM Rusty Cortes, PT [facility]   7/3/2017 2:30 PM Rusty Cortes PT [facility]

## 2017-06-26 ENCOUNTER — HOSPITAL ENCOUNTER (OUTPATIENT)
Dept: PHYSICAL THERAPY | Age: 57
Discharge: HOME | End: 2017-06-26
Payer: COMMERCIAL

## 2017-06-26 PROCEDURE — 97110 THERAPEUTIC EXERCISES: CPT

## 2017-06-26 PROCEDURE — 97140 MANUAL THERAPY 1/> REGIONS: CPT

## 2017-06-26 NOTE — PROGRESS NOTES
PT DAILY TREATMENT NOTE - [facility]     Patient Name: Lucy Cornejo  Date:2017  : 1960  [x]  Patient  Verified  Payor: Navneet Roe / Plan: [employer] / Product Type: HMO /    In time:2:21  Out time:3:03  Total Treatment Time (min): 34  Visit #: 8  of 10    Treatment Area: Right arm pain [M79.601]  Right hand pain [M79.641]    SUBJECTIVE  Pain Level (0-10 scale): 0  Any medication changes, allergies to medications, adverse drug reactions, diagnosis change, or new procedure performed?: [x] No    [] Yes (see summary sheet for update)  Subjective functional status/changes:   [] No changes reported  NO pain.     OBJECTIVE    Modality rationale: decrease edema, decrease inflammation, decrease pain and increase tissue extensibility to improve the patients ability to perform ADL    Min Type Additional Details    [] Estim:  []Unatt       []IFC  []Premod                        []Other:  []w/ice   []w/heat  Position:  Location:    [] Estim: []Att    []TENS instruct  []NMES                    []Other:  []w/US   []w/ice   []w/heat  Position:  Location:    []  Traction: [] Cervical       []Lumbar                       [] Prone          []Supine                       []Intermittent   []Continuous Lbs:  [] before manual  [] after manual    []  Ultrasound: []Continuous   [] Pulsed                           []1MHz   []3MHz W/cm2:  Location:    []  Iontophoresis with dexamethasone         Location: [] Take home patch   [] In clinic    []  Ice     []  heat  []  Ice massage  []  Laser   []  Anodyne Position:  Location:    []  Laser with stim  []  Other:  Position:  Location:    []  Vasopneumatic Device Pressure:       [] lo [] med [] hi   Temperature: [] lo [] med [] hi   [x] Skin assessment post-treatment:  [x]intact []redness- no adverse reaction    []redness  adverse reaction:      min []Eval                  []Re-Eval       24 min Therapeutic Exercise:  [x] See flow sheet :   Rationale: increase ROM and increase strength to improve the patients ability to perform ADL      min Therapeutic Activity:  []  See flow sheet :   Rationale:   to improve the patients ability to       min Neuromuscular Re-education:  []  See flow sheet :   Rationale:   to improve the patients ability to     10 min Manual Therapy:  CSP TX  With passive stretch  (B) UT   Rationale: decrease pain, increase ROM, increase tissue extensibility and decrease edema  to perform ADL      min Gait Training:  ___ feet with ___ device on level surfaces with ___ level of assist   Rationale: With   [x] TE   [] TA   [] neuro   [] other: Patient Education: [x] Review HEP    [] Progressed/Changed HEP based on:   [] positioning   [] body mechanics   [] transfers   [] heat/ice application    [] other:      Other Objective/Functional Measures:  Pt  Reports tingling  Is  Gone but  Numbness  Is  present    Pain Level (0-10 scale) post treatment:0    ASSESSMENT/Changes in Function: Responded  Fairly  Well  To each there ex. Patient will continue to benefit from skilled PT services to address functional mobility deficits, address ROM deficits, address strength deficits, analyze and address soft tissue restrictions and instruct in home and community integration to attain remaining goals.      [x]  See Plan of Care  []  See progress note/recertification  []  See Discharge Summary         Progress towards goals / Updated goals:   1 patient will have established and be independent with HEP to aid with progression of skilled PT program  EVAL issued  CURRENT progressing 6/22/17  2 patient will report 10-20% overall improvement with her involvement to aid with increase tolerance to ADLs and activities  EVAL C/O numbness/ weakness R hand and burning/ tingling R tricep  CURRENT   60%  Improvement   6/22/17  3 patient will have FOTO improved to 42 to show increase function and use of R UE with ADLS and work demands  EVAL 32  CURRENT 41 6/13/17  Long Term Goals: To be accomplished in 10 treatments:  1 patient will report 50% overall improvement with her involvement to aid with increase tolerance to ADLs and activities  EVAL C/O numbness/ weakness R hand and burning/ tingling R tricep  CURRENT Progressing 5% 6/8/17  2 patient will have FOTO improved to 52 to show increase function and use of R UE with ADLS and work demands  EVAL 32  CURRENT 41 6/13/17  3 patient will have improved R  to 20# to aid with increase use of R UE with ADLs and work demands  EVAL 14#  [address] [facility] [address]  []  Upgrade activities as tolerated     [x]  Continue plan of care  []  Update interventions per flow sheet       []  Discharge due to:_  [x]  Other:_  REASSESS    Strength  SHILPA Deal PTA 6/26/2017  2:49 PM    Future Appointments  Date Time Provider Brenda Randle   6/29/2017 3:00 PM Brayden Weems, PT [facility]   7/3/2017 2:30 PM Brayden Weems PT [facility]

## 2017-06-29 ENCOUNTER — APPOINTMENT (OUTPATIENT)
Dept: PHYSICAL THERAPY | Age: 57
End: 2017-06-29
Payer: COMMERCIAL

## 2017-07-03 ENCOUNTER — HOSPITAL ENCOUNTER (OUTPATIENT)
Dept: PHYSICAL THERAPY | Age: 57
Discharge: HOME | End: 2017-07-03
Payer: COMMERCIAL

## 2017-07-03 PROCEDURE — 97140 MANUAL THERAPY 1/> REGIONS: CPT

## 2017-07-03 PROCEDURE — 97110 THERAPEUTIC EXERCISES: CPT

## 2017-07-03 NOTE — PROGRESS NOTES
PT DAILY TREATMENT NOTE - [facility]     Patient Name: Isela Schroeder  Date:7/3/2017  : 1960  [x]  Patient  Verified  Payor: [employer] / Plan: [employer] / Product Type: HMO /    In time:2:27  Out time: 3:10  Total Treatment Time (min): 38  Visit #: 9 of 10    Treatment Area: Right arm pain [M79.601]  Right hand pain [M79.641]    SUBJECTIVE  Pain Level (0-10 scale): 0  Any medication changes, allergies to medications, adverse drug reactions, diagnosis change, or new procedure performed?: [x] No    [] Yes (see summary sheet for update)  Subjective functional status/changes:   [] No changes reported  Numbness.     OBJECTIVE    Modality rationale: decrease edema, decrease inflammation, decrease pain and increase tissue extensibility to improve the patients ability to perform ADL    Min Type Additional Details    [] Estim:  []Unatt       []IFC  []Premod                        []Other:  []w/ice   []w/heat  Position:  Location:    [] Estim: []Att    []TENS instruct  []NMES                    []Other:  []w/US   []w/ice   []w/heat  Position:  Location:    []  Traction: [] Cervical       []Lumbar                       [] Prone          []Supine                       []Intermittent   []Continuous Lbs:  [] before manual  [] after manual    []  Ultrasound: []Continuous   [] Pulsed                           []1MHz   []3MHz W/cm2:  Location:    []  Iontophoresis with dexamethasone         Location: [] Take home patch   [] In clinic    []  Ice     []  heat  []  Ice massage  []  Laser   []  Anodyne Position:  Location:    []  Laser with stim  []  Other:  Position:  Location:    []  Vasopneumatic Device Pressure:       [] lo [] med [] hi   Temperature: [] lo [] med [] hi   [x] Skin assessment post-treatment:  [x]intact []redness- no adverse reaction    []redness  adverse reaction:      min []Eval                  []Re-Eval       26 min Therapeutic Exercise:  [x] See flow sheet :   Rationale: increase ROM and increase strength to improve the patients ability to perform ADL      min Therapeutic Activity:  []  See flow sheet :   Rationale:   to improve the patients ability to       min Neuromuscular Re-education:  []  See flow sheet :   Rationale:   to improve the patients ability to     12 min CSP TX  With passive  Stretch (B) UT   Rationale: decrease pain, increase ROM, increase tissue extensibility and decrease edema  to perform ADL      min Gait Training:  ___ feet with ___ device on level surfaces with ___ level of assist   Rationale: With   [x] TE   [] TA   [] neuro   [] other: Patient Education: [x] Review HEP    [] Progressed/Changed HEP based on:   [] positioning   [] body mechanics   [] transfers   [] heat/ice application    [] other:      Other Objective/Functional Measures: Added  30#  Gripper/Red  web    Pain Level (0-10 scale) post treatment: 0    ASSESSMENT/Changes in Function: Numbness  With  Resolved  Following manual.    Patient will continue to benefit from skilled PT services to address functional mobility deficits, address ROM deficits, address strength deficits, analyze and address soft tissue restrictions and instruct in home and community integration to attain remaining goals.      [x]  See Plan of Care  []  See progress note/recertification  []  See Discharge Summary         Progress towards goals / Updated goals:  1 patient will have established and be independent with HEP to aid with progression of skilled PT program  EVAL issued  CURRENT progressing 6/22/17  2 patient will report 10-20% overall improvement with her involvement to aid with increase tolerance to ADLs and activities  EVAL C/O numbness/ weakness R hand and burning/ tingling R tricep  CURRENT   60%  Improvement   6/22/17  3 patient will have FOTO improved to 42 to show increase function and use of R UE with ADLS and work demands  EVAL 32  CURRENT 41 6/13/17  Long Term Goals: To be accomplished in 10 treatments:  1 patient will report 50% overall improvement with her involvement to aid with increase tolerance to ADLs and activities  EVAL C/O numbness/ weakness R hand and burning/ tingling R tricep  CURRENT Progressing 5% 6/8/17  2 patient will have FOTO improved to 52 to show increase function and use of R UE with ADLS and work demands  EVAL 32  CURRENT 41 6/13/17  3 patient will have improved R  to 20# to aid with increase use of R UE with ADLs and work demands  EVAL 14#  [address] [facility] [address]  []  Upgrade activities as tolerated     [x]  Continue plan of care  []  Update interventions per flow sheet       []  Discharge due to:_  []  Other:_      Marleny García PTA 7/3/2017  2:22 PM    Future Appointments  Date Time Provider Brenda Randel   7/3/2017 2:30 PM Payton Jose PTA [facility]

## 2017-07-18 ENCOUNTER — HOSPITAL ENCOUNTER (OUTPATIENT)
Dept: PHYSICAL THERAPY | Age: 57
Discharge: HOME | End: 2017-07-18
Payer: COMMERCIAL

## 2017-07-18 PROCEDURE — 97140 MANUAL THERAPY 1/> REGIONS: CPT

## 2017-07-18 PROCEDURE — 97110 THERAPEUTIC EXERCISES: CPT

## 2017-07-18 NOTE — PROGRESS NOTES
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 Patient reports that she has a doctor's appointment on August second. Patient reports 70% improvement since [facility]. FOTO improved to 43. She appears appropriate for attempted self management. Thank you.     Goal:Patient reports no more tingling in right arm since [facility]. Patient reports that she has a doctor's appointment on August second. Patient reports 70% improvement since [facility].  Status at last note/certification:eval  Status at discharge: met    Althea Partida will report 10-20% overall improvement with her involvement to aid with increase tolerance to ADLs and activities  Status at last note/certification:eval  Status at discharge: met    Goal:patient will have FOTO improved to 42 to show increase function and use of R UE with ADLS and work demands   Status at last note/certification:eval  Status at discharge: met    Althea Partida will report 50% overall improvement with her involvement to aid with increase tolerance to ADLs and activities  Status at last note/certification:eval  Status at discharge: met      ASSESSMENT/RECOMMENDATIONS:  [x]Discontinue therapy progressing towards or have reached established goals  []Discontinue therapy due to lack of appreciable progress towards goals  []Discontinue therapy due to lack of attendance or compliance  []Other:     Thank you for this referral.     Mook Jurado, PT 7/18/2017 1:45 PM

## 2017-07-18 NOTE — PROGRESS NOTES
PT DAILY TREATMENT NOTE 3-16    Patient Name: Ignacio Coulter  Date:2017  : 1960  [x]  Patient  Verified  Payor: [employer] / Plan: [employer] / Product Type: HMO /    In time:12:20  Out time:1:04  Total Treatment Time (min): 44  Visit #: 10 of 10    Treatment Area: Right arm pain [M79.601]  Right hand pain [M79.641]    SUBJECTIVE  Pain Level (0-10 scale): 0  Any medication changes, allergies to medications, adverse drug reactions, diagnosis change, or new procedure performed?: [x] No    [] Yes (see summary sheet for update)  Subjective functional status/changes:   [] No changes reported  Patient reports no more tingling in right arm since [facility]. Patient reports that she has a doctor's appointment on August second. Patient reports 70% improvement since [facility].      OBJECTIVE  Modality rationale:    Min Type Additional Details    [] Estim:  []Unatt       []IFC  []Premod                        []Other:  []w/ice   []w/heat  Position:  Location:    [] Estim: []Att    []TENS instruct  []NMES                    []Other:  []w/US   []w/ice   []w/heat  Position:  Location:    []  Traction: [] Cervical       []Lumbar                       [] Prone          []Supine                       []Intermittent   []Continuous Lbs:  [] before manual  [] after manual    []  Ultrasound: []Continuous   [] Pulsed                           []1MHz   []3MHz Location:  W/cm2:    []  Iontophoresis with dexamethasone         Location: [] Take home patch   [] In clinic    []  Ice     []  heat  []  Ice massage  []  Laser   []  Anodyne Position:  Location:    []  Laser with stim  []  Other: Position:  Location:    []  Vasopneumatic Device Pressure:       [] lo [] med [] hi   Temperature: [] lo [] med [] hi   [] Skin assessment post-treatment:  []intact []redness- no adverse reaction    []redness  adverse reaction:     36 min Therapeutic Exercise:  [x] See flow sheet :   Rationale: increase ROM, increase strength and improve coordination to improve the patients ability to increase ease with ADLs    8 min Manual Therapy:  Manual c/s traction, manual bilateral UT stretch   Rationale: decrease pain, increase ROM and increase tissue extensibility to ease ADL tolerance           With   [] TE   [] TA   [] neuro   [] other: Patient Education: [x] Review HEP    [] Progressed/Changed HEP based on:   [] positioning   [] body mechanics   [] transfers   [] heat/ice application    [] other:      Other Objective/Functional Measures:   FOTO score of 43     Pain Level (0-10 scale) post treatment: 0    ASSESSMENT/Changes in Function: Patient has been a pleasure to treat. Patient is D/C today, reports 70% improvement since [facility] and states that she has no tingling in right arm since [facility]. FOTO score to 43 today. Patient continues to report noncompliance with HEP, however, post therapist encouragement to perform HEP for continued strengthening outside of therapy, patient verbally expresses that she will attempt to be more consistent with performance of HEP. Patient will continue to benefit from skilled PT services to modify and progress therapeutic interventions, address functional mobility deficits, address ROM deficits, address strength deficits, analyze and address soft tissue restrictions, analyze and cue movement patterns, analyze and modify body mechanics/ergonomics and assess and modify postural abnormalities to attain remaining goals.      []  See Plan of Care  []  See progress note/recertification  []  See Discharge Summary         Progress towards goals / Updated goals:  1 patient will have established and be independent with HEP to aid with progression of skilled PT program  EVAL issued  CURRENT progressing 6/22/17-not met per patient report (7/18/2017)  2 patient will report 10-20% overall improvement with her involvement to aid with increase tolerance to ADLs and activities  EVAL C/O numbness/ weakness R hand and burning/ tingling R tricep  CURRENT   60%  Improvement   6/22/17  3 patient will have FOTO improved to 42 to show increase function and use of R UE with ADLS and work demands  EVAL 32  CURRENT: met, score to 43 (7/18/2017)  [address] be accomplished in 10 treatments:  1 patient will report 50% overall improvement with her involvement to aid with increase tolerance to ADLs and activities  EVAL C/O numbness/ weakness R hand and burning/ tingling R tricep  CURRENT Progressing 5% 6/8/17--met, patient reports 70% improvement and states that she has no tingling in right hand (7/18/2017)  2 patient will have FOTO improved to 52 to show increase function and use of R UE with ADLS and work demands  EVAL 32  Progressing,  Score to 43 (7/18/2017)  3 patient will have improved R  to 20# to aid with increase use of R UE with ADLs and work demands  EVAL 14#  CURRENT--met, able to perform gripper 30# x 10 reps (7/8/2017)       PLAN  []  Upgrade activities as tolerated     []  Continue plan of care  []  Update interventions per flow sheet       [x]  Discharge   []  Other:_      Axel Solorio 7/18/2017  12:11 PM    Future Appointments  Date Time Provider Brenda Randle   7/18/2017 12:30 PM Axel Solorio [facility]   7/24/2017 2:30 PM Payton Deal PTA [facility]   7/31/2017 3:30 PM Cassia Mujica PT [facility]   8/2/2017 2:30 PM Tammy Valdovinos MD [facility]

## 2017-07-24 ENCOUNTER — APPOINTMENT (OUTPATIENT)
Dept: PHYSICAL THERAPY | Age: 57
End: 2017-07-24
Payer: COMMERCIAL

## 2017-07-31 ENCOUNTER — APPOINTMENT (OUTPATIENT)
Dept: PHYSICAL THERAPY | Age: 57
End: 2017-07-31
Payer: COMMERCIAL

## 2017-08-02 ENCOUNTER — OFFICE VISIT (OUTPATIENT)
Dept: ORTHOPEDIC SURGERY | Age: 57
End: 2017-08-02

## 2017-08-02 VITALS
SYSTOLIC BLOOD PRESSURE: 147 MMHG | BODY MASS INDEX: 36.19 KG/M2 | HEART RATE: 69 BPM | WEIGHT: 212 LBS | DIASTOLIC BLOOD PRESSURE: 90 MMHG | HEIGHT: 64 IN

## 2017-08-02 DIAGNOSIS — M47.812 CERVICAL SPONDYLOSIS WITHOUT MYELOPATHY: ICD-10-CM

## 2017-08-02 DIAGNOSIS — M50.30 OTHER CERVICAL DISC DEGENERATION, UNSPECIFIED CERVICAL REGION: ICD-10-CM

## 2017-08-02 DIAGNOSIS — M48.02 CERVICAL SPINAL STENOSIS: ICD-10-CM

## 2017-08-02 DIAGNOSIS — M50.20 HNP (HERNIATED NUCLEUS PULPOSUS), CERVICAL: Primary | ICD-10-CM

## 2017-08-02 DIAGNOSIS — M54.12 CERVICAL NEURITIS: ICD-10-CM

## 2017-08-02 RX ORDER — SIMVASTATIN 10 MG/1
TABLET, FILM COATED ORAL
COMMUNITY
Start: 2016-11-14

## 2017-08-02 RX ORDER — ESOMEPRAZOLE MAGNESIUM 40 MG/1
40 CAPSULE, DELAYED RELEASE ORAL
COMMUNITY

## 2017-08-02 RX ORDER — MELATONIN
DAILY
COMMUNITY

## 2017-08-02 RX ORDER — TOPIRAMATE 25 MG/1
TABLET ORAL
Qty: 90 TAB | Refills: 1 | Status: SHIPPED | OUTPATIENT
Start: 2017-08-02 | End: 2017-08-24

## 2017-08-02 RX ORDER — GLIMEPIRIDE 4 MG/1
4 TABLET ORAL
COMMUNITY

## 2017-08-02 RX ORDER — GABAPENTIN 100 MG/1
100 CAPSULE ORAL
COMMUNITY

## 2017-08-02 RX ORDER — HYDROCHLOROTHIAZIDE 25 MG/1
25 TABLET ORAL
COMMUNITY

## 2017-08-02 RX ORDER — LANOLIN ALCOHOL/MO/W.PET/CERES
1000 CREAM (GRAM) TOPICAL DAILY
COMMUNITY

## 2017-08-02 RX ORDER — METFORMIN HYDROCHLORIDE 1000 MG/1
1000 TABLET ORAL
COMMUNITY

## 2017-08-04 ENCOUNTER — OFFICE VISIT (OUTPATIENT)
Dept: ORTHOPEDIC SURGERY | Age: 57
End: 2017-08-04

## 2017-08-04 VITALS
TEMPERATURE: 98 F | WEIGHT: 212 LBS | RESPIRATION RATE: 18 BRPM | BODY MASS INDEX: 36.19 KG/M2 | DIASTOLIC BLOOD PRESSURE: 83 MMHG | SYSTOLIC BLOOD PRESSURE: 141 MMHG | HEIGHT: 64 IN | HEART RATE: 72 BPM | OXYGEN SATURATION: 99 %

## 2017-08-04 DIAGNOSIS — M50.20 HNP (HERNIATED NUCLEUS PULPOSUS), CERVICAL: Primary | ICD-10-CM

## 2017-08-04 DIAGNOSIS — M48.02 CERVICAL SPINAL STENOSIS: ICD-10-CM

## 2017-08-04 RX ORDER — PROMETHAZINE HYDROCHLORIDE 25 MG/1
25 TABLET ORAL
COMMUNITY

## 2017-08-04 NOTE — PROGRESS NOTES
[address] [facility]   Pre-Surgical Worksheet    Patient: Marimar Triana                         MRN: [id number]     Age:  62 y.o.,      Sex: female    YOB: 1960           SRINI: August 4, 2017  PCP: Poncho Grayson MD    Allergies   Allergen Reactions    Ibuprofen Shortness of Breath         ICD-10-CM ICD-9-CM    1. HNP (herniated nucleus pulposus), cervical M50.20 722.0 AMB POC XRAY, SPINE, CERVICAL; 2 OR 3   2. Cervical spinal stenosis M48.02 723.0 AMB POC XRAY, SPINE, CERVICAL; 2 OR 3       Surgery: ACDF C7 T1. Pain Assessment   Pain Assessment  8/4/2017   Location of Pain Back   Location Modifiers -   Severity of Pain 2   Quality of Pain Aching   Quality of Pain Comment -   Frequency of Pain Constant   Aggravating Factors Stretching   Aggravating Factors Comment extending for long periods of time   Limiting Behavior -   Relieving Factors (No Data)   Relieving Factors Comment repositioning   Result of Injury -       Visit Vitals    /83    Pulse 72    Temp 98 °F (36.7 °C) (Oral)    Resp 18    Ht 5' 4\" (1.626 m)    Wt 212 lb (96.2 kg)    SpO2 99%    BMI 36.39 kg/m2       ADL Limits: Patient stated pain can get to a 8. Stated she has difficulty getting dressed, and completing tasks without help    Spine Surgery?: No:  When ? [facility] Where? .    Spinal Injections?: No:   When ? [facility] Where? .    Physical Therapy?: Yes  When 2017. Where [facility] [place] . NSAID's?: No:     Pain Medications?: No:   Type: ? [facility]: NO, Where: ?    Current Outpatient Prescriptions   Medication Sig    promethazine (PHENERGAN) 25 mg tablet Take 25 mg by mouth every six (6) hours as needed for Nausea.  simvastatin (ZOCOR) 10 mg tablet TAKE 1 TABLET BY MOUTH ONCE A DAY WITH DINNER    esomeprazole (NEXIUM) 40 mg capsule Take 40 mg by mouth.  gabapentin (NEURONTIN) 100 mg capsule Take 100 mg by mouth.  hydroCHLOROthiazide (HYDRODIURIL) 25 mg tablet Take 25 mg by mouth.     metFORMIN (GLUCOPHAGE) 1,000 mg tablet Take 1,000 mg by mouth.  glimepiride (AMARYL) 4 mg tablet Take 4 mg by mouth.  VITAMIN E-400 PO Take  by mouth.  cholecalciferol (VITAMIN D3) 1,000 unit tablet Take  by mouth daily.  cyanocobalamin (VITAMIN B-12) 1,000 mcg tablet Take 1,000 mcg by mouth daily.  topiramate (TOPAMAX) 25 mg tablet 3 tabs PO  QHS     No current facility-administered medications for this visit.         Past Medical History:   Diagnosis Date    Diabetes ([address]     High cholesterol     Hypertension        Past Surgical History:   Procedure Laterality Date    HX BREAST BIOPSY      HX CHOLECYSTECTOMY      HX HERNIA REPAIR         Social History     Social History    Marital status: UNKNOWN     Spouse name: N/A    Number of children: N/A    Years of education: N/A     Social History Main Topics    Smoking status: Former Smoker    Smokeless tobacco: Never Used    Alcohol use No    Drug use: None    Sexual activity: Not Asked     Other Topics Concern    None     Social History Narrative

## 2017-08-04 NOTE — PROGRESS NOTES
[address]  [address], [address]  [address]  Phone: [phone]  Fax: [phone]  INITIAL CONSULTATION  Patient: Husam Malone                MRN: [id number]       SSN: [id number]  YOB: 1960        AGE: 62 y.o. SEX: female  Body mass index is 36.39 kg/(m^2). PCP: Germán Starkey MD  08/04/17    Chief Complaint   Patient presents with    Neck Pain     neck pain eval    Referral / Consult         HISTORY OF PRESENT ILLNESS, RADIOGRAPHS, and PLAN:         HISTORY OF PRESENT ILLNESS:  Ms. Sherman Easley is seen today at the request of Dr. Germán Starkey and Dr. Tayler Lizama. Ms. Sherman Easley is a 59-year-old female who began to have neck pain several months ago progressing with first right and left-sided hand numbness and weakness, loss of intrinsic strength, dexterity. She has been dropping objects. She was worked up to find a C7-T1 disc protrusion with bilateral foraminal stenosis. EMG confirmed the radiculopathy. She is referred today for surgical intervention. PHYSICAL EXAMINATION:  Physical exam demonstrates loss of thenar mass, intrinsic weakness, numbness, and loss of dexterity. RADIOGRAPHS:  MRI demonstrates degenerative changes at C6-7 and C7-T1, and T1-2, but foraminal stenosis with more dramatic degenerative change at C7-T1 with bilateral foraminal stenosis. EMG demonstrates C8 radiculopathy. ASSESSMENT/PLAN: I discussed the matter at length with the patient. She does have degenerative changes at C6-7, C7-T1, and T1-2. One could consider a multilevel cervical fusion here, but given the levels involved, it is probably just best doing C7-T1 and seeing how the other levels evolve not wanting to put too much stress on any of these adjacent segments. My biggest concern is just access. Trying to get to C7-T1 can be difficult.   We will get some plain x-rays seeing how we do trying to obtain access to this C7-T1 segment. I discussed the nature of surgery, the risks, benefits, complications, and alternatives to surgery. Surgery would be a cervical decompression and fusion at C7-T1. We will proceed with surgery once the appropriate approvals and clearances take place. Past Medical History:   Diagnosis Date    Diabetes ([facility] [address]     High cholesterol     Hypertension        Family History   Problem Relation Age of Onset    Diabetes Mother     Diabetes Sister     Hypertension Sister     Diabetes Other     Hypertension Other        Current Outpatient Prescriptions   Medication Sig Dispense Refill    promethazine (PHENERGAN) 25 mg tablet Take 25 mg by mouth every six (6) hours as needed for Nausea.  simvastatin (ZOCOR) 10 mg tablet TAKE 1 TABLET BY MOUTH ONCE A DAY WITH DINNER      esomeprazole (NEXIUM) 40 mg capsule Take 40 mg by mouth.  gabapentin (NEURONTIN) 100 mg capsule Take 100 mg by mouth.  hydroCHLOROthiazide (HYDRODIURIL) 25 mg tablet Take 25 mg by mouth.  metFORMIN (GLUCOPHAGE) 1,000 mg tablet Take 1,000 mg by mouth.  glimepiride (AMARYL) 4 mg tablet Take 4 mg by mouth.  VITAMIN E-400 PO Take  by mouth.  cholecalciferol (VITAMIN D3) 1,000 unit tablet Take  by mouth daily.  cyanocobalamin (VITAMIN B-12) 1,000 mcg tablet Take 1,000 mcg by mouth daily.  topiramate (TOPAMAX) 25 mg tablet 3 tabs PO  QHS 90 Tab 1       Allergies   Allergen Reactions    Ibuprofen Shortness of Breath       Past Surgical History:   Procedure Laterality Date    HX BREAST BIOPSY      HX CHOLECYSTECTOMY      HX HERNIA REPAIR         Past Medical History:   Diagnosis Date    Diabetes ([facility]     High cholesterol     Hypertension        Social History     Social History    Marital status: UNKNOWN     Spouse name: N/A    Number of children: N/A    Years of education: N/A     Occupational History    Not on file.      Social History Main Topics    Smoking status: Former Smoker  Smokeless tobacco: Never Used    Alcohol use No    Drug use: Not on file    Sexual activity: Not on file     Other Topics Concern    Not on file     Social History Narrative       REVIEW OF SYSTEMS:   CONSTITUTIONAL SYMPTOMS:  Negative. EYES:  Negative. EARS, NOSE, THROAT AND MOUTH:  Negative. CARDIOVASCULAR:  Negative. RESPIRATORY:  Negative. GENITOURINARY: Negative. GASTROINTESTINAL:  Negative. INTEGUMENTARY (SKIN AND/OR BREAST):  Negative. MUSCULOSKELETAL: Per HPI.   ENDOCRINE/RHEUMATOLOGIC:  Negative. NEUROLOGICAL:  Per HPI. HEMATOLOGIC/LYMPHATIC:  Negative. ALLERGIC/IMMUNOLOGIC:  Negative. PSYCHIATRIC:  Negative. PHYSICAL EXAMINATION:   Visit Vitals    /83    Pulse 72    Temp 98 °F (36.7 °C) (Oral)    Resp 18    Ht 5' 4\" (1.626 m)    Wt 212 lb (96.2 kg)    SpO2 99%    BMI 36.39 kg/m2    PAIN SCALE: 2/10    CONSTITUTIONAL: The patient is in no apparent distress and is alert and oriented x 3. NEUROLOGICAL: Alert and oriented x 3. Ambulation without assistive device. FWB. EXTREMITIES:  See musculoskeletal.  MUSCULOSKELETAL:   Head and Neck: LT sided neck pain. Balance issues. Negative for misalignment, asymmetry, crepitation, defects, tenderness masses or effusions.  Left Upper Extremity: Inspection, percussion and palpation preformed. Minors sign is negative.  Right Upper Extremity: RHD. Weakness intrinsics. Numbness. Some loss of dexterity. Inspection, percussion and palpation preformed. Minors sign is negative.  Spine, Ribs and Pelvis: Inspection, percussion and palpation preformed. Negative for misalignment, asymmetry, crepitation, defects, tenderness masses or effusions.  Left Lower Extremity: Inspection, percussion and palpation preformed. Negative straight leg raise.  Right Lower Extremity: Inspection, percussion and palpation preformed. Negative straight leg raise. SPINE EXAM:     Cervical spine: Neck is midline.  Normal muscle tone. No focal atrophy is noted. ASSESSMENT    ICD-10-CM ICD-9-CM    1. HNP (herniated nucleus pulposus), cervical M50.20 722.0 AMB POC XRAY, SPINE, CERVICAL; 2 OR 3   2. Cervical spinal stenosis M48.02 723.0 AMB POC XRAY, SPINE, CERVICAL; 2 OR 3       Written by Rosalind Burch, as dictated by Gerardo Conte MD.    I, Dr. Gerardo Conte MD, confirm that all documentation is accurate.

## 2017-08-04 NOTE — PATIENT INSTRUCTIONS
Pinched Nerve in the Neck: Care Instructions  Your Care Instructions  A pinched nerve in the neck happens when a vertebra or disc in the upper part of your spine is damaged. This damage can happen because of an injury. Or it can just happen with age. The changes caused by the damage may put pressure on a nearby nerve root, pinching it. This causes symptoms such as sharp pain in your neck, shoulder, arm, or back. You may also have tingling or numbness. Sometimes it makes your arm weaker. The symptoms are usually worse when you turn your head or strain your neck. For many people, the symptoms get better over time and finally go away. Early treatment usually includes medicines for pain and swelling. Sometimes physical therapy and special exercises may help. Follow-up care is a key part of your treatment and safety. Be sure to make and go to all appointments, and call your doctor if you are having problems. It's also a good idea to know your test results and keep a list of the medicines you take. How can you care for yourself at home? · Be safe with medicines. Read and follow all instructions on the label. ¨ If the doctor gave you a prescription medicine for pain, take it as prescribed. ¨ If you are not taking a prescription pain medicine, ask your doctor if you can take an over-the-counter medicine. · Try using a heating pad on a low or medium setting for 15 to 20 minutes every 2 or 3 hours. Try a warm shower in place of one session with the heating pad. You can also buy single-use heat wraps that last up to 8 hours. · You can also try an ice pack for 10 to 15 minutes every 2 to 3 hours. There isn't strong evidence that either heat or ice will help. But you can try them to see if they help you. · Don't spend too long in one position. Take short breaks to move around and change positions. · Wear a seat belt and shoulder harness when you are in a car.   · Sleep with a pillow under your head and neck that keeps your neck straight. · If you were given a neck brace (cervical collar) to limit neck motion, wear it as instructed for as many days as your doctor tells you to. Do not wear it longer than you were told to. Wearing a brace for too long can lead to neck stiffness and can weaken the neck muscles. · Follow your doctor's instructions for gentle neck-stretching exercises. · Do not smoke. Smoking can slow healing of your discs. If you need help quitting, talk to your doctor about stop-smoking programs and medicines. These can increase your chances of quitting for good. · Avoid strenuous work or exercise until your doctor says it is okay. When should you call for help? Call 911 anytime you think you may need emergency care. For example, call if:  · You are unable to move an arm or a leg at all. Call your doctor now or seek immediate medical care if:  · You have new or worse symptoms in your arms, legs, chest, belly, or buttocks. Symptoms may include:  ¨ Numbness or tingling. ¨ Weakness. ¨ Pain. · You lose bladder or bowel control. Watch closely for changes in your health, and be sure to contact your doctor if:  · You are not getting better as expected. Where can you learn more? Go to http://denzel-tor.info/. Enter N917 in the search box to learn more about \"Pinched Nerve in the Neck: Care Instructions. \"  Current as of: March 21, 2017  Content Version: 11.3  © 0403-1293 [employer]. Care instructions adapted under license by [employer] (which disclaims liability or warranty for this information). If you have questions about a medical condition or this instruction, always ask your healthcare professional. [place] [address] any warranty or liability for your use of this information.

## 2017-08-24 ENCOUNTER — HOSPITAL ENCOUNTER (OUTPATIENT)
Dept: PREADMISSION TESTING | Age: 57
Discharge: HOME | End: 2017-08-24
Payer: COMMERCIAL

## 2017-08-24 DIAGNOSIS — Z01.818 PRE-OP EXAM: ICD-10-CM

## 2017-08-24 LAB
ALBUMIN SERPL-MCNC: 4.3 G/DL (ref 3.4–5)
ALBUMIN/GLOB SERPL: 1.4 {RATIO} (ref 0.8–1.7)
ALP SERPL-CCNC: 85 U/L (ref 45–117)
ALT SERPL-CCNC: 24 U/L (ref 13–56)
ANION GAP SERPL CALC-SCNC: 8 MMOL/L (ref 3–18)
AST SERPL-CCNC: 13 U/L (ref 15–37)
ATRIAL RATE: 60 BPM
BILIRUB SERPL-MCNC: 0.4 MG/DL (ref 0.2–1)
BUN SERPL-MCNC: 12 MG/DL (ref 7–18)
BUN/CREAT SERPL: 17 (ref 12–20)
CALCIUM SERPL-MCNC: 9.3 MG/DL (ref 8.5–10.1)
CALCULATED P AXIS, ECG09: 49 DEGREES
CALCULATED R AXIS, ECG10: 30 DEGREES
CALCULATED T AXIS, ECG11: 34 DEGREES
CHLORIDE SERPL-SCNC: 104 MMOL/L (ref 100–108)
CO2 SERPL-SCNC: 29 MMOL/L (ref 21–32)
CREAT SERPL-MCNC: 0.7 MG/DL (ref 0.6–1.3)
DIAGNOSIS, 93000: NORMAL
ERYTHROCYTE [DISTWIDTH] IN BLOOD BY AUTOMATED COUNT: 12.8 % (ref 11.6–14.5)
GLOBULIN SER CALC-MCNC: 3 G/DL (ref 2–4)
GLUCOSE SERPL-MCNC: 135 MG/DL (ref 74–99)
HCT VFR BLD AUTO: 37.9 % (ref 35–45)
HGB BLD-MCNC: 13.3 G/DL (ref 12–16)
MCH RBC QN AUTO: 30.9 PG (ref 24–34)
MCHC RBC AUTO-ENTMCNC: 35.1 G/DL (ref 31–37)
MCV RBC AUTO: 87.9 FL (ref 74–97)
P-R INTERVAL, ECG05: 176 MS
PLATELET # BLD AUTO: 269 K/UL (ref 135–420)
PMV BLD AUTO: 10 FL (ref 9.2–11.8)
POTASSIUM SERPL-SCNC: 4.1 MMOL/L (ref 3.5–5.5)
PROT SERPL-MCNC: 7.3 G/DL (ref 6.4–8.2)
Q-T INTERVAL, ECG07: 406 MS
QRS DURATION, ECG06: 92 MS
QTC CALCULATION (BEZET), ECG08: 406 MS
RBC # BLD AUTO: 4.31 M/UL (ref 4.2–5.3)
SODIUM SERPL-SCNC: 141 MMOL/L (ref 136–145)
VENTRICULAR RATE, ECG03: 60 BPM
WBC # BLD AUTO: 5.5 K/UL (ref 4.6–13.2)

## 2017-08-24 PROCEDURE — 93005 ELECTROCARDIOGRAM TRACING: CPT

## 2017-08-24 PROCEDURE — 36415 COLL VENOUS BLD VENIPUNCTURE: CPT | Performed by: ORTHOPAEDIC SURGERY

## 2017-08-24 PROCEDURE — 80053 COMPREHEN METABOLIC PANEL: CPT | Performed by: ORTHOPAEDIC SURGERY

## 2017-08-24 PROCEDURE — 85027 COMPLETE CBC AUTOMATED: CPT | Performed by: ORTHOPAEDIC SURGERY

## 2017-08-24 NOTE — H&P
Pre-Admission History and Physical    Patient: Lisseth Hale   MRN: [id number]   SSN: [id number]   YOB: 1960   Age: 62 y.o. Sex: female     Patient scheduled for: ACDF C7/T1. Date of surgery: 08/30/17. Location of surgery: [facility]. Surgeon: Anita Gil MD    HPI:  Lisseth Hale is a 62 y.o. female with neck pain that started several months ago progressing with first right and left-sided hand numbness and weakness, loss of intrinsic strength, dexterity. She has been dropping objects. She was worked up to find a C7-T1 disc protrusion with bilateral foraminal stenosis. EMG confirmed the radiculopathy. She reports a pain level of 8/10. MRI demonstrates degenerative changes at C6-7 and C7-T1, and T1-2, but foraminal stenosis with more dramatic degenerative change at C7-T1 with bilateral foraminal stenosis. EMG demonstrates C8 radiculopathy. This patient has failed the presurgical conservative treatments  including physical therapy. Pain has impacted the patient's functional ability to dress and perform ADLs and she is being admitted for surgical intervention.          Past Medical History:   Diagnosis Date    Diabetes ([address]     High cholesterol     Hypertension      Social History     Social History    Marital status:      Spouse name: N/A    Number of children: N/A    Years of education: N/A     Social History Main Topics    Smoking status: Former Smoker    Smokeless tobacco: Never Used      Comment: quit 2008 (approx)    Alcohol use No    Drug use: No    Sexual activity: Not Asked     Other Topics Concern    None     Social History Narrative     Past Surgical History:   Procedure Laterality Date    HX BREAST BIOPSY      HX CHOLECYSTECTOMY      HX HERNIA REPAIR       Family History   Problem Relation Age of Onset    Diabetes Mother     Diabetes Sister     Hypertension Sister     Diabetes Other     Hypertension Other      Allergies   Allergen Reactions    Ibuprofen Shortness of Breath     Current Outpatient Prescriptions   Medication Sig Dispense Refill    promethazine (PHENERGAN) 25 mg tablet Take 25 mg by mouth every six (6) hours as needed for Nausea.  simvastatin (ZOCOR) 10 mg tablet TAKE 1 TABLET BY MOUTH ONCE A DAY WITH DINNER      esomeprazole (NEXIUM) 40 mg capsule Take 40 mg by mouth.  gabapentin (NEURONTIN) 100 mg capsule Take 100 mg by mouth.  hydroCHLOROthiazide (HYDRODIURIL) 25 mg tablet Take 25 mg by mouth.  metFORMIN (GLUCOPHAGE) 1,000 mg tablet Take 1,000 mg by mouth.  glimepiride (AMARYL) 4 mg tablet Take 4 mg by mouth.  VITAMIN E-400 PO Take  by mouth.  cholecalciferol (VITAMIN D3) 1,000 unit tablet Take  by mouth daily.  cyanocobalamin (VITAMIN B-12) 1,000 mcg tablet Take 1,000 mcg by mouth daily. ROS:  Denies chills, fever,night sweats,  bowel or bladder dysfunction, unexplained weight loss/weight gain, chest pain, sob or anxiety. Physical Examination    Gen: Well developed, well nourished 62 y.o. female   Visit Vitals    /83    Pulse 72    Temp 98 °F (36.7 °C) (Oral)    Resp 18    Ht 5' 4\" (1.626 m)    Wt 212 lb (96.2 kg)    SpO2 99%    BMI 36.39 kg/m2    PAIN SCALE: 2/10     CONSTITUTIONAL: The patient is in no apparent distress and is alert and oriented x 3. NEUROLOGICAL: Alert and oriented x 3. Ambulation without assistive device. FWB. EXTREMITIES:  See musculoskeletal.  MUSCULOSKELETAL:  · Head and Neck: LT sided neck pain. Balance issues. Negative for misalignment, asymmetry, crepitation, defects, tenderness masses or effusions. · Left Upper Extremity: Inspection, percussion and palpation preformed. Minors sign is negative. · Right Upper Extremity: RHD. Weakness intrinsics. Numbness. Some loss of dexterity. Inspection, percussion and palpation preformed. Minors sign is negative. · Spine, Ribs and Pelvis:  Inspection, percussion and palpation preformed. Negative for misalignment, asymmetry, crepitation, defects, tenderness masses or effusions. · Left Lower Extremity: Inspection, percussion and palpation preformed. Negative straight leg raise. · Right Lower Extremity: Inspection, percussion and palpation preformed. Negative straight leg raise.        SPINE EXAM:      Cervical spine: Neck is midline. Normal muscle tone. No focal atrophy is noted. Assessment and Plan    Due to the pt's persistent symptoms unrelieved by conservative measure Darío Clements is being admitted to [facility] to undergo surgical intervention. The post-operative plan of care consists of physical therapy, home health and a 2 week f/u office visit. We are pending medical clearance by Dr. Mary Sands. The risks, benefits, complications and alternatives to surgery have been discussed in detail with the patient. The patient understands and agrees to proceed.      Ric Bowman NP-BC dictating for Brady Servin MD

## 2017-08-25 NOTE — PROGRESS NOTES
Vineet Barrett attended the Spine Surgery Pre-Operative class on 8/24/17. Topics discussed included surgery preparation, what to expect the day of surgery, medications, physical and occupational therapy, and discharge planning. It was discussed that this is considered an elective surgery and that prior to the surgery they need to make decisions such as arranging for help at home once they are discharged. She was given a Spine Patient education notebook to take home. Opportunity was given to ask questions and the phone number of the Orthopaedic Nurse Clinician was given for any questions or concerns that may arise later.

## 2017-08-29 ENCOUNTER — ANESTHESIA EVENT (OUTPATIENT)
Dept: SURGERY | Age: 57
End: 2017-08-29
Payer: COMMERCIAL

## 2017-08-30 ENCOUNTER — ANESTHESIA (OUTPATIENT)
Dept: SURGERY | Age: 57
End: 2017-08-30
Payer: COMMERCIAL

## 2017-08-30 ENCOUNTER — HOSPITAL ENCOUNTER (OUTPATIENT)
Age: 57
Setting detail: OBSERVATION
Discharge: HOME HEALTH | End: 2017-08-31
Attending: ORTHOPAEDIC SURGERY | Admitting: ORTHOPAEDIC SURGERY
Payer: COMMERCIAL

## 2017-08-30 ENCOUNTER — APPOINTMENT (OUTPATIENT)
Dept: GENERAL RADIOLOGY | Age: 57
End: 2017-08-30
Attending: ORTHOPAEDIC SURGERY
Payer: COMMERCIAL

## 2017-08-30 PROBLEM — M48.02 CERVICAL STENOSIS OF SPINE: Status: ACTIVE | Noted: 2017-08-30

## 2017-08-30 LAB
EST. AVERAGE GLUCOSE BLD GHB EST-MCNC: 171 MG/DL
GLUCOSE BLD STRIP.AUTO-MCNC: 143 MG/DL (ref 70–110)
GLUCOSE BLD STRIP.AUTO-MCNC: 148 MG/DL (ref 70–110)
GLUCOSE BLD STRIP.AUTO-MCNC: 166 MG/DL (ref 70–110)
HBA1C MFR BLD: 7.6 % (ref 4.2–5.6)

## 2017-08-30 PROCEDURE — 74011636637 HC RX REV CODE- 636/637: Performed by: NURSE PRACTITIONER

## 2017-08-30 PROCEDURE — 74011000250 HC RX REV CODE- 250

## 2017-08-30 PROCEDURE — 77030031588 HC SPCR SPN ZEROP VA SYNT -I: Performed by: ORTHOPAEDIC SURGERY

## 2017-08-30 PROCEDURE — 77030002933 HC SUT MCRYL J&J -A: Performed by: ORTHOPAEDIC SURGERY

## 2017-08-30 PROCEDURE — 77030019908 HC STETH ESOPH SIMS -A: Performed by: ANESTHESIOLOGY

## 2017-08-30 PROCEDURE — 77030010514 HC APPL CLP LIG COVD -B: Performed by: ORTHOPAEDIC SURGERY

## 2017-08-30 PROCEDURE — 74011250636 HC RX REV CODE- 250/636

## 2017-08-30 PROCEDURE — 77030012893: Performed by: ORTHOPAEDIC SURGERY

## 2017-08-30 PROCEDURE — 74011250636 HC RX REV CODE- 250/636: Performed by: NURSE ANESTHETIST, CERTIFIED REGISTERED

## 2017-08-30 PROCEDURE — C1713 ANCHOR/SCREW BN/BN,TIS/BN: HCPCS | Performed by: ORTHOPAEDIC SURGERY

## 2017-08-30 PROCEDURE — 77030034966 HC GRFT DBM PLS PST ALLOFUS 1CC ALLO- C: Performed by: ORTHOPAEDIC SURGERY

## 2017-08-30 PROCEDURE — 77030008683 HC TU ET CUF COVD -A: Performed by: ANESTHESIOLOGY

## 2017-08-30 PROCEDURE — 77030010507 HC ADH SKN DERMBND J&J -B: Performed by: ORTHOPAEDIC SURGERY

## 2017-08-30 PROCEDURE — 82962 GLUCOSE BLOOD TEST: CPT

## 2017-08-30 PROCEDURE — 77010033678 HC OXYGEN DAILY

## 2017-08-30 PROCEDURE — 36415 COLL VENOUS BLD VENIPUNCTURE: CPT | Performed by: NURSE PRACTITIONER

## 2017-08-30 PROCEDURE — 77030011640 HC PAD GRND REM COVD -A: Performed by: ORTHOPAEDIC SURGERY

## 2017-08-30 PROCEDURE — 77030011267 HC ELECTRD BLD COVD -A: Performed by: ORTHOPAEDIC SURGERY

## 2017-08-30 PROCEDURE — 74011250637 HC RX REV CODE- 250/637: Performed by: NURSE ANESTHETIST, CERTIFIED REGISTERED

## 2017-08-30 PROCEDURE — 83036 HEMOGLOBIN GLYCOSYLATED A1C: CPT | Performed by: NURSE PRACTITIONER

## 2017-08-30 PROCEDURE — 74011250637 HC RX REV CODE- 250/637: Performed by: ORTHOPAEDIC SURGERY

## 2017-08-30 PROCEDURE — 77030029099 HC BN WAX SSPC -A: Performed by: ORTHOPAEDIC SURGERY

## 2017-08-30 PROCEDURE — 74011000250 HC RX REV CODE- 250: Performed by: ORTHOPAEDIC SURGERY

## 2017-08-30 PROCEDURE — 76060000033 HC ANESTHESIA 1 TO 1.5 HR: Performed by: ORTHOPAEDIC SURGERY

## 2017-08-30 PROCEDURE — 97161 PT EVAL LOW COMPLEX 20 MIN: CPT

## 2017-08-30 PROCEDURE — 76210000016 HC OR PH I REC 1 TO 1.5 HR: Performed by: ORTHOPAEDIC SURGERY

## 2017-08-30 PROCEDURE — 77030020782 HC GWN BAIR PAWS FLX 3M -B: Performed by: ORTHOPAEDIC SURGERY

## 2017-08-30 PROCEDURE — 99218 HC RM OBSERVATION: CPT

## 2017-08-30 PROCEDURE — 76010000149 HC OR TIME 1 TO 1.5 HR: Performed by: ORTHOPAEDIC SURGERY

## 2017-08-30 PROCEDURE — 77030004402 HC BUR NEUR STRY -C: Performed by: ORTHOPAEDIC SURGERY

## 2017-08-30 PROCEDURE — 77030018836 HC SOL IRR NACL ICUM -A: Performed by: ORTHOPAEDIC SURGERY

## 2017-08-30 PROCEDURE — 74011250636 HC RX REV CODE- 250/636: Performed by: ORTHOPAEDIC SURGERY

## 2017-08-30 PROCEDURE — 77030031139 HC SUT VCRL2 J&J -A: Performed by: ORTHOPAEDIC SURGERY

## 2017-08-30 PROCEDURE — 77030032490 HC SLV COMPR SCD KNE COVD -B: Performed by: ORTHOPAEDIC SURGERY

## 2017-08-30 PROCEDURE — 74011000272 HC RX REV CODE- 272: Performed by: ORTHOPAEDIC SURGERY

## 2017-08-30 PROCEDURE — 77030003909 HC BIT DRL W/STP SYNT -D: Performed by: ORTHOPAEDIC SURGERY

## 2017-08-30 PROCEDURE — 77030027138 HC INCENT SPIROMETER -A

## 2017-08-30 DEVICE — GRAFT BONE PASTE DEMINERLIZED BONE MTRX 1CC ALLOFUSE +: Type: IMPLANTABLE DEVICE | Site: ANTERIOR CERVICAL | Status: FUNCTIONAL

## 2017-08-30 DEVICE — SCREW SPNL L16MM DIA3.7MM NONSTERILE ANT CERV PUR TI SELF: Type: IMPLANTABLE DEVICE | Site: ANTERIOR CERVICAL | Status: FUNCTIONAL

## 2017-08-30 DEVICE — SPACER SPNL ZERO-P VA IMPL 9MM LORDOTIC STERILE: Type: IMPLANTABLE DEVICE | Site: ANTERIOR CERVICAL | Status: FUNCTIONAL

## 2017-08-30 RX ORDER — PROPOFOL 10 MG/ML
INJECTION, EMULSION INTRAVENOUS AS NEEDED
Status: DISCONTINUED | OUTPATIENT
Start: 2017-08-30 | End: 2017-08-30 | Stop reason: HOSPADM

## 2017-08-30 RX ORDER — SODIUM CHLORIDE 0.9 % (FLUSH) 0.9 %
5-10 SYRINGE (ML) INJECTION AS NEEDED
Status: DISCONTINUED | OUTPATIENT
Start: 2017-08-30 | End: 2017-08-30 | Stop reason: HOSPADM

## 2017-08-30 RX ORDER — PANTOPRAZOLE SODIUM 40 MG/1
40 TABLET, DELAYED RELEASE ORAL
Status: DISCONTINUED | OUTPATIENT
Start: 2017-08-31 | End: 2017-08-31 | Stop reason: HOSPADM

## 2017-08-30 RX ORDER — SODIUM CHLORIDE 9 MG/ML
INJECTION, SOLUTION INTRAVENOUS
Status: DISCONTINUED | OUTPATIENT
Start: 2017-08-30 | End: 2017-08-30 | Stop reason: HOSPADM

## 2017-08-30 RX ORDER — METFORMIN HYDROCHLORIDE 500 MG/1
1000 TABLET ORAL 2 TIMES DAILY WITH MEALS
Status: DISCONTINUED | OUTPATIENT
Start: 2017-08-30 | End: 2017-08-30

## 2017-08-30 RX ORDER — DIPHENHYDRAMINE HCL 25 MG
25 CAPSULE ORAL
Status: DISCONTINUED | OUTPATIENT
Start: 2017-08-30 | End: 2017-08-31 | Stop reason: HOSPADM

## 2017-08-30 RX ORDER — SODIUM CHLORIDE 0.9 % (FLUSH) 0.9 %
5-10 SYRINGE (ML) INJECTION EVERY 8 HOURS
Status: DISCONTINUED | OUTPATIENT
Start: 2017-08-30 | End: 2017-08-30 | Stop reason: HOSPADM

## 2017-08-30 RX ORDER — ONDANSETRON 2 MG/ML
4 INJECTION INTRAMUSCULAR; INTRAVENOUS
Status: DISCONTINUED | OUTPATIENT
Start: 2017-08-30 | End: 2017-08-31 | Stop reason: HOSPADM

## 2017-08-30 RX ORDER — ONDANSETRON 2 MG/ML
INJECTION INTRAMUSCULAR; INTRAVENOUS AS NEEDED
Status: DISCONTINUED | OUTPATIENT
Start: 2017-08-30 | End: 2017-08-30 | Stop reason: HOSPADM

## 2017-08-30 RX ORDER — OXYCODONE AND ACETAMINOPHEN 10; 325 MG/1; MG/1
1 TABLET ORAL
Status: DISCONTINUED | OUTPATIENT
Start: 2017-08-30 | End: 2017-08-31 | Stop reason: HOSPADM

## 2017-08-30 RX ORDER — ALBUTEROL SULFATE 0.83 MG/ML
2.5 SOLUTION RESPIRATORY (INHALATION) AS NEEDED
Status: DISCONTINUED | OUTPATIENT
Start: 2017-08-30 | End: 2017-08-30 | Stop reason: HOSPADM

## 2017-08-30 RX ORDER — INSULIN LISPRO 100 [IU]/ML
INJECTION, SOLUTION INTRAVENOUS; SUBCUTANEOUS ONCE
Status: DISCONTINUED | OUTPATIENT
Start: 2017-08-30 | End: 2017-08-30 | Stop reason: HOSPADM

## 2017-08-30 RX ORDER — SODIUM CHLORIDE, SODIUM LACTATE, POTASSIUM CHLORIDE, CALCIUM CHLORIDE 600; 310; 30; 20 MG/100ML; MG/100ML; MG/100ML; MG/100ML
75 INJECTION, SOLUTION INTRAVENOUS CONTINUOUS
Status: DISCONTINUED | OUTPATIENT
Start: 2017-08-30 | End: 2017-08-30 | Stop reason: HOSPADM

## 2017-08-30 RX ORDER — CEFAZOLIN SODIUM 2 G/50ML
2 SOLUTION INTRAVENOUS EVERY 8 HOURS
Status: DISCONTINUED | OUTPATIENT
Start: 2017-08-30 | End: 2017-08-31 | Stop reason: HOSPADM

## 2017-08-30 RX ORDER — NEOSTIGMINE METHYLSULFATE 1 MG/ML
INJECTION INTRAVENOUS AS NEEDED
Status: DISCONTINUED | OUTPATIENT
Start: 2017-08-30 | End: 2017-08-30 | Stop reason: HOSPADM

## 2017-08-30 RX ORDER — ACETAMINOPHEN 500 MG
1000 TABLET ORAL ONCE
Status: COMPLETED | OUTPATIENT
Start: 2017-08-30 | End: 2017-08-30

## 2017-08-30 RX ORDER — FENTANYL CITRATE 50 UG/ML
INJECTION, SOLUTION INTRAMUSCULAR; INTRAVENOUS AS NEEDED
Status: DISCONTINUED | OUTPATIENT
Start: 2017-08-30 | End: 2017-08-30 | Stop reason: HOSPADM

## 2017-08-30 RX ORDER — DEXTROSE, SODIUM CHLORIDE, AND POTASSIUM CHLORIDE 5; .45; .15 G/100ML; G/100ML; G/100ML
25 INJECTION INTRAVENOUS CONTINUOUS
Status: DISCONTINUED | OUTPATIENT
Start: 2017-08-30 | End: 2017-08-31 | Stop reason: HOSPADM

## 2017-08-30 RX ORDER — MAGNESIUM SULFATE 100 %
4 CRYSTALS MISCELLANEOUS AS NEEDED
Status: DISCONTINUED | OUTPATIENT
Start: 2017-08-30 | End: 2017-08-30 | Stop reason: HOSPADM

## 2017-08-30 RX ORDER — GABAPENTIN 100 MG/1
100 CAPSULE ORAL 3 TIMES DAILY
Status: DISCONTINUED | OUTPATIENT
Start: 2017-08-30 | End: 2017-08-31 | Stop reason: HOSPADM

## 2017-08-30 RX ORDER — DIAZEPAM 5 MG/1
5 TABLET ORAL
Status: DISCONTINUED | OUTPATIENT
Start: 2017-08-30 | End: 2017-08-31 | Stop reason: HOSPADM

## 2017-08-30 RX ORDER — SODIUM CHLORIDE 0.9 % (FLUSH) 0.9 %
5-10 SYRINGE (ML) INJECTION AS NEEDED
Status: DISCONTINUED | OUTPATIENT
Start: 2017-08-30 | End: 2017-08-31 | Stop reason: HOSPADM

## 2017-08-30 RX ORDER — HYDRALAZINE HYDROCHLORIDE 20 MG/ML
10 INJECTION INTRAMUSCULAR; INTRAVENOUS
Status: COMPLETED | OUTPATIENT
Start: 2017-08-30 | End: 2017-08-31

## 2017-08-30 RX ORDER — DEXTROSE 50 % IN WATER (D50W) INTRAVENOUS SYRINGE
25-50 AS NEEDED
Status: DISCONTINUED | OUTPATIENT
Start: 2017-08-30 | End: 2017-08-31 | Stop reason: HOSPADM

## 2017-08-30 RX ORDER — NALOXONE HYDROCHLORIDE 0.4 MG/ML
0.04 INJECTION, SOLUTION INTRAMUSCULAR; INTRAVENOUS; SUBCUTANEOUS AS NEEDED
Status: DISCONTINUED | OUTPATIENT
Start: 2017-08-30 | End: 2017-08-30 | Stop reason: HOSPADM

## 2017-08-30 RX ORDER — LORAZEPAM 2 MG/ML
1 INJECTION INTRAMUSCULAR
Status: DISCONTINUED | OUTPATIENT
Start: 2017-08-30 | End: 2017-08-31 | Stop reason: HOSPADM

## 2017-08-30 RX ORDER — ONDANSETRON 2 MG/ML
4 INJECTION INTRAMUSCULAR; INTRAVENOUS ONCE
Status: DISCONTINUED | OUTPATIENT
Start: 2017-08-30 | End: 2017-08-30 | Stop reason: HOSPADM

## 2017-08-30 RX ORDER — DIPHENHYDRAMINE HYDROCHLORIDE 50 MG/ML
12.5 INJECTION, SOLUTION INTRAMUSCULAR; INTRAVENOUS
Status: DISCONTINUED | OUTPATIENT
Start: 2017-08-30 | End: 2017-08-30 | Stop reason: HOSPADM

## 2017-08-30 RX ORDER — NALOXONE HYDROCHLORIDE 0.4 MG/ML
0.4 INJECTION, SOLUTION INTRAMUSCULAR; INTRAVENOUS; SUBCUTANEOUS AS NEEDED
Status: DISCONTINUED | OUTPATIENT
Start: 2017-08-30 | End: 2017-08-31 | Stop reason: HOSPADM

## 2017-08-30 RX ORDER — CEFAZOLIN SODIUM 2 G/50ML
2 SOLUTION INTRAVENOUS ONCE
Status: DISCONTINUED | OUTPATIENT
Start: 2017-08-30 | End: 2017-08-30 | Stop reason: HOSPADM

## 2017-08-30 RX ORDER — PREGABALIN 75 MG/1
75 CAPSULE ORAL ONCE
Status: COMPLETED | OUTPATIENT
Start: 2017-08-30 | End: 2017-08-30

## 2017-08-30 RX ORDER — GLIMEPIRIDE 4 MG/1
4 TABLET ORAL
Status: DISCONTINUED | OUTPATIENT
Start: 2017-08-31 | End: 2017-08-30

## 2017-08-30 RX ORDER — VANCOMYCIN HYDROCHLORIDE 1 G/20ML
INJECTION, POWDER, LYOPHILIZED, FOR SOLUTION INTRAVENOUS AS NEEDED
Status: DISCONTINUED | OUTPATIENT
Start: 2017-08-30 | End: 2017-08-30 | Stop reason: HOSPADM

## 2017-08-30 RX ORDER — DEXAMETHASONE SODIUM PHOSPHATE 4 MG/ML
INJECTION, SOLUTION INTRA-ARTICULAR; INTRALESIONAL; INTRAMUSCULAR; INTRAVENOUS; SOFT TISSUE AS NEEDED
Status: DISCONTINUED | OUTPATIENT
Start: 2017-08-30 | End: 2017-08-30 | Stop reason: HOSPADM

## 2017-08-30 RX ORDER — HYDROMORPHONE HYDROCHLORIDE 2 MG/ML
0.5 INJECTION, SOLUTION INTRAMUSCULAR; INTRAVENOUS; SUBCUTANEOUS
Status: COMPLETED | OUTPATIENT
Start: 2017-08-30 | End: 2017-08-30

## 2017-08-30 RX ORDER — GLYCOPYRROLATE 0.2 MG/ML
INJECTION INTRAMUSCULAR; INTRAVENOUS AS NEEDED
Status: DISCONTINUED | OUTPATIENT
Start: 2017-08-30 | End: 2017-08-30 | Stop reason: HOSPADM

## 2017-08-30 RX ORDER — MIDAZOLAM HYDROCHLORIDE 1 MG/ML
INJECTION, SOLUTION INTRAMUSCULAR; INTRAVENOUS AS NEEDED
Status: DISCONTINUED | OUTPATIENT
Start: 2017-08-30 | End: 2017-08-30 | Stop reason: HOSPADM

## 2017-08-30 RX ORDER — FAMOTIDINE 20 MG/1
20 TABLET, FILM COATED ORAL ONCE
Status: COMPLETED | OUTPATIENT
Start: 2017-08-30 | End: 2017-08-30

## 2017-08-30 RX ORDER — DEXTROSE 50 % IN WATER (D50W) INTRAVENOUS SYRINGE
25-50 AS NEEDED
Status: DISCONTINUED | OUTPATIENT
Start: 2017-08-30 | End: 2017-08-30 | Stop reason: HOSPADM

## 2017-08-30 RX ORDER — MAGNESIUM SULFATE 100 %
4 CRYSTALS MISCELLANEOUS AS NEEDED
Status: DISCONTINUED | OUTPATIENT
Start: 2017-08-30 | End: 2017-08-31 | Stop reason: HOSPADM

## 2017-08-30 RX ORDER — LIDOCAINE HYDROCHLORIDE 20 MG/ML
INJECTION, SOLUTION EPIDURAL; INFILTRATION; INTRACAUDAL; PERINEURAL AS NEEDED
Status: DISCONTINUED | OUTPATIENT
Start: 2017-08-30 | End: 2017-08-30 | Stop reason: HOSPADM

## 2017-08-30 RX ORDER — HYDROCHLOROTHIAZIDE 25 MG/1
25 TABLET ORAL DAILY
Status: DISCONTINUED | OUTPATIENT
Start: 2017-08-31 | End: 2017-08-31 | Stop reason: HOSPADM

## 2017-08-30 RX ORDER — ROCURONIUM BROMIDE 10 MG/ML
INJECTION, SOLUTION INTRAVENOUS AS NEEDED
Status: DISCONTINUED | OUTPATIENT
Start: 2017-08-30 | End: 2017-08-30 | Stop reason: HOSPADM

## 2017-08-30 RX ORDER — SIMVASTATIN 20 MG/1
10 TABLET, FILM COATED ORAL DAILY
Status: DISCONTINUED | OUTPATIENT
Start: 2017-08-31 | End: 2017-08-31 | Stop reason: HOSPADM

## 2017-08-30 RX ORDER — SODIUM CHLORIDE, SODIUM LACTATE, POTASSIUM CHLORIDE, CALCIUM CHLORIDE 600; 310; 30; 20 MG/100ML; MG/100ML; MG/100ML; MG/100ML
50 INJECTION, SOLUTION INTRAVENOUS CONTINUOUS
Status: DISCONTINUED | OUTPATIENT
Start: 2017-08-30 | End: 2017-08-30 | Stop reason: HOSPADM

## 2017-08-30 RX ORDER — HYDROMORPHONE HYDROCHLORIDE 1 MG/ML
1 INJECTION, SOLUTION INTRAMUSCULAR; INTRAVENOUS; SUBCUTANEOUS
Status: DISCONTINUED | OUTPATIENT
Start: 2017-08-30 | End: 2017-08-31 | Stop reason: HOSPADM

## 2017-08-30 RX ORDER — METHYLENE BLUE 10 MG/ML
INJECTION INTRAVENOUS AS NEEDED
Status: DISCONTINUED | OUTPATIENT
Start: 2017-08-30 | End: 2017-08-30 | Stop reason: HOSPADM

## 2017-08-30 RX ORDER — DIPHENHYDRAMINE HYDROCHLORIDE 50 MG/ML
12.5 INJECTION, SOLUTION INTRAMUSCULAR; INTRAVENOUS
Status: DISCONTINUED | OUTPATIENT
Start: 2017-08-30 | End: 2017-08-31 | Stop reason: HOSPADM

## 2017-08-30 RX ORDER — INSULIN LISPRO 100 [IU]/ML
INJECTION, SOLUTION INTRAVENOUS; SUBCUTANEOUS
Status: DISCONTINUED | OUTPATIENT
Start: 2017-08-30 | End: 2017-08-31 | Stop reason: HOSPADM

## 2017-08-30 RX ORDER — SODIUM CHLORIDE 0.9 % (FLUSH) 0.9 %
5-10 SYRINGE (ML) INJECTION EVERY 8 HOURS
Status: DISCONTINUED | OUTPATIENT
Start: 2017-08-30 | End: 2017-08-31 | Stop reason: HOSPADM

## 2017-08-30 RX ORDER — SUCCINYLCHOLINE CHLORIDE 20 MG/ML
INJECTION INTRAMUSCULAR; INTRAVENOUS AS NEEDED
Status: DISCONTINUED | OUTPATIENT
Start: 2017-08-30 | End: 2017-08-30 | Stop reason: HOSPADM

## 2017-08-30 RX ADMIN — FAMOTIDINE 20 MG: 20 TABLET, FILM COATED ORAL at 07:49

## 2017-08-30 RX ADMIN — HYDROMORPHONE HYDROCHLORIDE 0.5 MG: 2 INJECTION, SOLUTION INTRAMUSCULAR; INTRAVENOUS; SUBCUTANEOUS at 11:38

## 2017-08-30 RX ADMIN — DEXAMETHASONE SODIUM PHOSPHATE 10 MG: 4 INJECTION, SOLUTION INTRA-ARTICULAR; INTRALESIONAL; INTRAMUSCULAR; INTRAVENOUS; SOFT TISSUE at 09:43

## 2017-08-30 RX ADMIN — INSULIN LISPRO 2 UNITS: 100 INJECTION, SOLUTION INTRAVENOUS; SUBCUTANEOUS at 22:25

## 2017-08-30 RX ADMIN — SUCCINYLCHOLINE CHLORIDE 120 MG: 20 INJECTION INTRAMUSCULAR; INTRAVENOUS at 09:43

## 2017-08-30 RX ADMIN — HYDROMORPHONE HYDROCHLORIDE 0.5 MG: 2 INJECTION, SOLUTION INTRAMUSCULAR; INTRAVENOUS; SUBCUTANEOUS at 11:08

## 2017-08-30 RX ADMIN — LORAZEPAM 1 MG: 2 INJECTION, SOLUTION INTRAMUSCULAR; INTRAVENOUS at 22:40

## 2017-08-30 RX ADMIN — LIDOCAINE HYDROCHLORIDE 75 MG: 20 INJECTION, SOLUTION EPIDURAL; INFILTRATION; INTRACAUDAL; PERINEURAL at 09:43

## 2017-08-30 RX ADMIN — GABAPENTIN 100 MG: 100 CAPSULE ORAL at 16:52

## 2017-08-30 RX ADMIN — DEXTROSE MONOHYDRATE, SODIUM CHLORIDE, AND POTASSIUM CHLORIDE 25 ML/HR: 50; 4.5; 1.49 INJECTION, SOLUTION INTRAVENOUS at 13:00

## 2017-08-30 RX ADMIN — ROCURONIUM BROMIDE 5 MG: 10 INJECTION, SOLUTION INTRAVENOUS at 09:43

## 2017-08-30 RX ADMIN — SODIUM CHLORIDE, SODIUM LACTATE, POTASSIUM CHLORIDE, AND CALCIUM CHLORIDE: 600; 310; 30; 20 INJECTION, SOLUTION INTRAVENOUS at 09:38

## 2017-08-30 RX ADMIN — CEFAZOLIN SODIUM 2 G: 2 SOLUTION INTRAVENOUS at 16:52

## 2017-08-30 RX ADMIN — FENTANYL CITRATE 50 MCG: 50 INJECTION, SOLUTION INTRAMUSCULAR; INTRAVENOUS at 10:46

## 2017-08-30 RX ADMIN — HYDROMORPHONE HYDROCHLORIDE 0.5 MG: 2 INJECTION, SOLUTION INTRAMUSCULAR; INTRAVENOUS; SUBCUTANEOUS at 11:28

## 2017-08-30 RX ADMIN — FENTANYL CITRATE 50 MCG: 50 INJECTION, SOLUTION INTRAMUSCULAR; INTRAVENOUS at 10:56

## 2017-08-30 RX ADMIN — FENTANYL CITRATE 50 MCG: 50 INJECTION, SOLUTION INTRAMUSCULAR; INTRAVENOUS at 09:51

## 2017-08-30 RX ADMIN — MIDAZOLAM HYDROCHLORIDE 2 MG: 1 INJECTION, SOLUTION INTRAMUSCULAR; INTRAVENOUS at 09:38

## 2017-08-30 RX ADMIN — GABAPENTIN 100 MG: 100 CAPSULE ORAL at 21:31

## 2017-08-30 RX ADMIN — HYDROMORPHONE HYDROCHLORIDE 0.5 MG: 2 INJECTION, SOLUTION INTRAMUSCULAR; INTRAVENOUS; SUBCUTANEOUS at 11:18

## 2017-08-30 RX ADMIN — SODIUM CHLORIDE: 9 INJECTION, SOLUTION INTRAVENOUS at 09:41

## 2017-08-30 RX ADMIN — ACETAMINOPHEN 1000 MG: 500 TABLET ORAL at 07:48

## 2017-08-30 RX ADMIN — FENTANYL CITRATE 50 MCG: 50 INJECTION, SOLUTION INTRAMUSCULAR; INTRAVENOUS at 09:43

## 2017-08-30 RX ADMIN — GLYCOPYRROLATE 0.6 MG: 0.2 INJECTION INTRAMUSCULAR; INTRAVENOUS at 10:53

## 2017-08-30 RX ADMIN — ONDANSETRON 4 MG: 2 INJECTION INTRAMUSCULAR; INTRAVENOUS at 09:38

## 2017-08-30 RX ADMIN — Medication 10 ML: at 14:00

## 2017-08-30 RX ADMIN — ROCURONIUM BROMIDE 25 MG: 10 INJECTION, SOLUTION INTRAVENOUS at 09:48

## 2017-08-30 RX ADMIN — NEOSTIGMINE METHYLSULFATE 4 MG: 1 INJECTION INTRAVENOUS at 10:53

## 2017-08-30 RX ADMIN — SODIUM CHLORIDE, SODIUM LACTATE, POTASSIUM CHLORIDE, AND CALCIUM CHLORIDE 75 ML/HR: 600; 310; 30; 20 INJECTION, SOLUTION INTRAVENOUS at 07:49

## 2017-08-30 RX ADMIN — GLYCOPYRROLATE 0.2 MG: 0.2 INJECTION INTRAMUSCULAR; INTRAVENOUS at 09:38

## 2017-08-30 RX ADMIN — PREGABALIN 75 MG: 75 CAPSULE ORAL at 07:48

## 2017-08-30 RX ADMIN — PROPOFOL 150 MG: 10 INJECTION, EMULSION INTRAVENOUS at 09:43

## 2017-08-30 RX ADMIN — HYDRALAZINE HYDROCHLORIDE 10 MG: 20 INJECTION INTRAMUSCULAR; INTRAVENOUS at 22:25

## 2017-08-30 NOTE — IP AVS SNAPSHOT
[address] Patient: Ignacio Coulter MRN: [id number] COU:[id number]/5/[id number] Current Discharge Medication List  
  
START taking these medications Dose & Instructions Dispensing Information Comments Morning Noon Evening Bedtime  
 oxyCODONE-acetaminophen  mg per tablet Commonly known as:  PERCOCET Your last dose was: Your next dose is:    
   
   
 Dose:  1 Tab Take 1 Tab by mouth every six (6) hours as needed for Pain. Max Daily Amount: 4 Tabs. Indications: acute post op pain Quantity:  30 Tab Refills:  0 CONTINUE these medications which have NOT CHANGED Dose & Instructions Dispensing Information Comments Morning Noon Evening Bedtime  
 esomeprazole 40 mg capsule Commonly known as:  Donaldson Fabry Your last dose was: Your next dose is:    
   
   
 Dose:  40 mg Take 40 mg by mouth. Refills:  0  
     
   
   
   
  
 gabapentin 100 mg capsule Commonly known as:  NEURONTIN Your last dose was: Your next dose is:    
   
   
 Dose:  100 mg Take 100 mg by mouth. Refills:  0  
     
   
   
   
  
 glimepiride 4 mg tablet Commonly known as:  AMARYL Your last dose was: Your next dose is:    
   
   
 Dose:  4 mg Take 4 mg by mouth. Refills:  0  
     
   
   
   
  
 hydroCHLOROthiazide 25 mg tablet Commonly known as:  HYDRODIURIL Your last dose was: Your next dose is:    
   
   
 Dose:  25 mg Take 25 mg by mouth. Refills:  0  
     
   
   
   
  
 metFORMIN 1,000 mg tablet Commonly known as:  GLUCOPHAGE Your last dose was: Your next dose is:    
   
   
 Dose:  1000 mg Take 1,000 mg by mouth. Refills:  0  
     
   
   
   
  
 promethazine 25 mg tablet Commonly known as:  PHENERGAN Your last dose was:     
   
Your next dose is:    
   
   
 Dose:  25 mg  
 Take 25 mg by mouth every six (6) hours as needed for Nausea. Refills:  0  
     
   
   
   
  
 simvastatin 10 mg tablet Commonly known as:  ZOCOR Your last dose was: Your next dose is: TAKE 1 TABLET BY MOUTH ONCE A DAY WITH DINNER Refills:  0  
     
   
   
   
  
 VITAMIN B-12 1,000 mcg tablet Generic drug:  cyanocobalamin Your last dose was: Your next dose is:    
   
   
 Dose:  1000 mcg Take 1,000 mcg by mouth daily. Refills:  0  
     
   
   
   
  
 VITAMIN D3 1,000 unit tablet Generic drug:  cholecalciferol Your last dose was: Your next dose is: Take  by mouth daily. Refills:  0  
     
   
   
   
  
 VITAMIN E-400 PO Your last dose was: Your next dose is: Take  by mouth. Refills:  0 Where to Get Your Medications Information on where to get these meds will be given to you by the nurse or doctor. ! Ask your nurse or doctor about these medications  
  oxyCODONE-acetaminophen  mg per tablet

## 2017-08-30 NOTE — BRIEF OP NOTE
BRIEF OPERATIVE NOTE    Date of Procedure: 8/30/2017   Preoperative Diagnosis: Cervical stenosis of spine [M48.02]  Postoperative Diagnosis: Cervical stenosis of spine [M48.02]    Procedure(s):  ANTERIOR CERVICAL DISCECTOMY WITH FUSION C7/T1   Surgeon(s) and Role:     * Lucie Brown MD - Primary         Assistant Staff:       Surgical Staff:  Circ-1: Cain Holland RN  Circ-2: Dequan Ruiz RN  Radiology Technician: Hermelinda Erickson  Scrub Tech-1: Alexandr Medina  Surg Asst-1: Mare Reed  Event Time In   Incision Start 1000   Incision Close      Anesthesia: General   Estimated Blood Loss: 5  Specimens: * No specimens in log *   Findings: stenosis   Complications: 0  Implants:   Implant Name Type Inv.  Item Serial No.  Lot No. LRB No. Used Action   GRAFT DBM PLUS PASTE 1ML -- ALLOFUSE - [id number]  GRAFT DBM PLUS PASTE 1ML -- ALLOFUSE [id number] [employer] [id number] N/A 1 Implanted   SPACER SPNE V-A LORDTC 9MM -- STRL ZERO-P VA - [id number]  SPACER SPNE V-A LORDTC 9MM -- STRL ZERO-P VA  SYNTHES [place] N/A N/A 1 Implanted   SCR SPNE CERV SD V-A 3.7X16MM -- ZERO-P VA - [id number]   SCR SPNE CERV SD V-A 3.7X16MM -- ZERO-P VA   SYNTHES [place] N/A N/A 1 Implanted

## 2017-08-30 NOTE — INTERVAL H&P NOTE
H&P Update:  Hailey Officer was seen and examined. History and physical has been reviewed. The patient has been examined.  There have been no significant clinical changes since the completion of the originally dated History and Physical.    Signed By: Matt Escamilla MD     August 30, 2017 9:07 AM

## 2017-08-30 NOTE — PROGRESS NOTES
Problem: Mobility Impaired (Adult and Pediatric)  Goal: *Acute Goals and Plan of Care (Insert Text)  Physical Therapy Goals  Initiated 8/30/2017 and to be accomplished within 7 day(s)  1. Patient will move from supine to sit and sit to supine , scoot up and down and roll side to side in bed with supervision/set-up. 2. Patient will transfer from bed to chair and chair to bed with supervision/set-up using the least restrictive device. 3. Patient will perform sit to stand with supervision/set-up. 4. Patient will ambulate with supervision/set-up for >200 feet with the least restrictive device. 5. Patient will ascend/descend 5 stairs with handrail(s) with minimal assistance/contact guard assist.  Outcome: Progressing Towards Goal  PHYSICAL THERAPY EVALUATION     Patient: Ynes Robertson (54 y.o. female)  Date: 8/30/2017  Primary Diagnosis: Cervical stenosis of spine [M48.02]  Procedure(s) (LRB):  ANTERIOR CERVICAL DISCECTOMY WITH FUSION C7/T1  (N/A) Day of Surgery   Precautions: Fall      ASSESSMENT :  Based on the objective data described below, the patient presents with impaired functional mobility including bed mobility, transfers, ambulation, and general activity tolerance following C7-T1 fusion. Patient presents today supine in bed with HOB elevated, soft cervical collar in place, and  in room. Patient is extremely drowsy, however agreeable to PT evaluation. Patient's  assisted with subjective history as patient demonstrated difficulty keeping eyes open during conversation. Patient's alertness improved slightly with assessment of LE strength and ROM, assisted to sitting EOB using log roll technique to maintain cervical precautions. Upon sitting, patient required near constant support from PT to maintain sitting balance with consistent VCs to keep eyes open. Standing not attempted at this time due to impaired safety with sitting balance, returned to supine using log roll technique.  Patient left with call bell in reach, HOB elevated,  at bedside, and nurse notified. Patient will benefit from skilled intervention to address the above impairments. Patients rehabilitation potential is considered to be Good  Factors which may influence rehabilitation potential include:   [ ]         None noted  [ ]         Mental ability/status  [ ]         Medical condition  [ ]         Home/family situation and support systems  [X]         Safety awareness  [X]         Pain tolerance/management  [ ]         Other:        PLAN :  Recommendations and Planned Interventions:  [X]           Bed Mobility Training             [X]    Neuromuscular Re-Education  [X]           Transfer Training                   [ ]    Orthotic/Prosthetic Training  [X]           Gait Training                          [ ]    Modalities  [X]           Therapeutic Exercises          [ ]    Edema Management/Control  [X]           Therapeutic Activities            [X]    Patient and Family Training/Education  [ ]           Other (comment):     Frequency/Duration: Patient will be followed by physical therapy 1-2 times per day/4-7 days per week to address goals. Discharge Recommendations: Home Health  Further Equipment Recommendations for Discharge: To be determined       SUBJECTIVE:   Patient stated I want to try.       OBJECTIVE DATA SUMMARY:       Past Medical History:   Diagnosis Date    Diabetes ([facility] [address]      High cholesterol      Hypertension       Past Surgical History:   Procedure Laterality Date    HX BREAST BIOPSY        HX CHOLECYSTECTOMY        HX HERNIA REPAIR         Barriers to Learning/Limitations: None  Compensate with: N/A  Prior Level of Function/Home Situation: Patient resides with  who will be able to assist patient as needed. She was independent with all mobility and ADLs prior to surgery, required increased time due to B UE radiculopathy.   Home Situation  Home Environment: Trailer/mobile home  # Steps to Enter: 5  Rails to Enter: Yes  Hand Rails : Bilateral  One/Two Story Residence: One story  Living Alone: No  Support Systems: Spouse/Significant Other/Partner  Patient Expects to be Discharged to[de-identified] Trailer/mobile home  Current DME Used/Available at Home: None  Critical Behavior:  Neurologic State: Drowsy  Psychosocial  Patient Behaviors: Calm; Cooperative  Family  Behaviors: Calm;Supportive ()  Strength:    Strength: Generally decreased, functional (B LE)  Tone & Sensation:   Tone: Normal  Sensation: Intact (B LE to LT)   Range Of Motion:  AROM: Generally decreased, functional (B LE)  Functional Mobility:  Bed Mobility:  Rolling: Contact guard assistance  Supine to Sit: Minimum assistance  Sit to Supine: Minimum assistance  Scooting: Minimum assistance  Transfers:  Sit to Stand:  (N/T)  Balance:   Sitting: Impaired; With support  Sitting - Static: Fair (occasional) (Fair -)  Sitting - Dynamic: Poor (constant support)  Standing:  (N/T due to drowsiness/unsafe sitting balance)  Ambulation/Gait Training:  N/T  Pain:  Pain Scale 1: Visual  Pain Intensity 1: 0  Pain Location 1: Neck  Pain Orientation 1: Posterior  Pain Intervention(s) 1: Medication (see MAR)  Activity Tolerance:   Fair  Please refer to the flowsheet for vital signs taken during this treatment. After treatment:   [ ] Patient left in no apparent distress sitting up in chair  [ ] Patient left sitting on EOB  [X] Patient left in no apparent distress in bed  [ ] Patient declined to be OOB at this time due to  [X] Call bell left within reach  [X] Nursing notified [place]  [X] Caregiver present  [ ] Bed alarm activated      COMMUNICATION/EDUCATION:   [X]         Fall prevention education was provided and the patient/caregiver indicated understanding. [X]         Patient/family have participated as able in goal setting and plan of care. [X]         Patient/family agree to work toward stated goals and plan of care.   [ ]         Patient understands intent and goals of therapy, but is neutral about his/her participation. [ ]         Patient is unable to participate in goal setting and plan of care. Thank you for this referral.  Cate Huitron   Time Calculation: 14 mins     Mobility  Current  CJ= 20-39%   Goal  CI= 1-19%. The severity rating is based on the Level of Assistance required for Functional Mobility and ADLs.      Eval Complexity: History: MEDIUM  Complexity : 1-2 comorbidities / personal factors will impact the outcome/ POC Exam:MEDIUM Complexity : 3 Standardized tests and measures addressing body structure, function, activity limitation and / or participation in recreation  Presentation: MEDIUM Complexity : Evolving with changing characteristics Overall Complexity:MEDIUM

## 2017-08-30 NOTE — PROGRESS NOTES
OR Today: ACDF C7/T1  Hx: DM, HTN-Diabetic orders placed and BP stable w/ home meds    VSS, LS clear, Apical pulse regular  Dressing clean, dry and intact, removed and reapplied soft collar  UA: Due to void  PT:Due to ambulate  Neuro: Drowsy, easily arousable. Prior to surgery she was having BUE pain w/ RUE worse than LUE. She also was having weakness and reported dropping things. Now: She states that she cannot really tell if her pain is better. She denies any current arm pain, just incisional neck pain. She has 4/5 BUE strength w/  and 4/5 BLE dorsi/plantar flexion. Moving all extremities. She has a sore throat but is swallowing w/out difficulty.     Kendell Rojas, NP

## 2017-08-30 NOTE — IP AVS SNAPSHOT
Marija Breeding 
 
 
 [address] Patient: Eli Chan MRN: [id number]:[id number] You are allergic to the following Allergen Reactions Ibuprofen Shortness of Breath Seafood Nausea Only Swelling Recent Documentation Height Weight BMI OB Status Smoking Status 1.626 m 92.5 kg 35.02 kg/m2 Postmenopausal Former Smoker Emergency Contacts Name Discharge Info Relation Home Work Mobile Kulwant Bush DISCHARGE CAREGIVER [3] Spouse [3] [phone] About your hospitalization You were admitted on:  August 30, 2017 You last received care in the:  [facility] You were discharged on:  August 31, 2017 Unit phone number:  [phone] Why you were hospitalized Your primary diagnosis was:  Not on File Your diagnoses also included:  Cervical Stenosis Of Spine Providers Seen During Your Hospitalizations Provider Role Specialty Primary office phone Shaunna Dyson MD Attending Provider Orthopedic Surgery [phone] Your Primary Care Physician (PCP) Primary Care Physician Office Phone Office Fax Josephine King, Patricia [facility]  [phone] Follow-up Information Follow up With Details Comments Contact Info Cricket Denver, NP Go on 9/15/2017 Appointment at [address] [place] [address] [address] [facility] [address] [phone] Jeanine Wasserman MD On 9/5/2017 September 5, 2017 @ 11:30 with Dr. Mary Kay Boone. [address] [phone] Your Appointments Thursday September 14, 2017  2:00 PM EDT  
POST OP with Cricket Denver, NP  
[facility] ([facility]) [address] [facility] [address]  
[phone]  Friday October 06, 2017 11:00 AM EDT  
POST OP with Shaunna Dyson MD  
 [facility] ([address]) [address] [facility] [address]  
[phone] Current Discharge Medication List  
  
START taking these medications Dose & Instructions Dispensing Information Comments Morning Noon Evening Bedtime  
 oxyCODONE-acetaminophen  mg per tablet Commonly known as:  PERCOCET Your last dose was: Your next dose is:    
   
   
 Dose:  1 Tab Take 1 Tab by mouth every six (6) hours as needed for Pain. Max Daily Amount: 4 Tabs. Indications: acute post op pain Quantity:  30 Tab Refills:  0 CONTINUE these medications which have NOT CHANGED Dose & Instructions Dispensing Information Comments Morning Noon Evening Bedtime  
 esomeprazole 40 mg capsule Commonly known as:  Maudry Maddi Your last dose was: Your next dose is:    
   
   
 Dose:  40 mg Take 40 mg by mouth. Refills:  0  
     
   
   
   
  
 gabapentin 100 mg capsule Commonly known as:  NEURONTIN Your last dose was: Your next dose is:    
   
   
 Dose:  100 mg Take 100 mg by mouth. Refills:  0  
     
   
   
   
  
 glimepiride 4 mg tablet Commonly known as:  AMARYL Your last dose was: Your next dose is:    
   
   
 Dose:  4 mg Take 4 mg by mouth. Refills:  0  
     
   
   
   
  
 hydroCHLOROthiazide 25 mg tablet Commonly known as:  HYDRODIURIL Your last dose was: Your next dose is:    
   
   
 Dose:  25 mg Take 25 mg by mouth. Refills:  0  
     
   
   
   
  
 metFORMIN 1,000 mg tablet Commonly known as:  GLUCOPHAGE Your last dose was: Your next dose is:    
   
   
 Dose:  1000 mg Take 1,000 mg by mouth. Refills:  0  
     
   
   
   
  
 promethazine 25 mg tablet Commonly known as:  PHENERGAN Your last dose was:     
   
Your next dose is:    
   
   
 Dose:  25 mg  
 Take 25 mg by mouth every six (6) hours as needed for Nausea. Refills:  0  
     
   
   
   
  
 simvastatin 10 mg tablet Commonly known as:  ZOCOR Your last dose was: Your next dose is: TAKE 1 TABLET BY MOUTH ONCE A DAY WITH DINNER Refills:  0  
     
   
   
   
  
 VITAMIN B-12 1,000 mcg tablet Generic drug:  cyanocobalamin Your last dose was: Your next dose is:    
   
   
 Dose:  1000 mcg Take 1,000 mcg by mouth daily. Refills:  0  
     
   
   
   
  
 VITAMIN D3 1,000 unit tablet Generic drug:  cholecalciferol Your last dose was: Your next dose is: Take  by mouth daily. Refills:  0  
     
   
   
   
  
 VITAMIN E-400 PO Your last dose was: Your next dose is: Take  by mouth. Refills:  0 Where to Get Your Medications Information on where to get these meds will be given to you by the nurse or doctor. ! Ask your nurse or doctor about these medications  
  oxyCODONE-acetaminophen  mg per tablet Discharge Instructions Cervical Discectomy: What to Expect at [facility] Your Recovery You can expect your neck to feel stiff or sore after surgery. This should improve in the weeks after surgery. You may have trouble sitting or standing in one position for very long and may need pain medicine in the weeks after your surgery. It may take up to 8 weeks to get back to your usual activities, but it may depend on what kind of surgery you had. Your doctor may advise you to work with a physical therapist to strengthen the muscles around your neck and back. This care sheet gives you a general idea about how long it will take for you to recover. But each person recovers at a different pace. Follow the steps below to get better as quickly as possible. How can you care for yourself at home? Activity · Rest when you feel tired. Getting enough sleep will help you recover. · Try to walk each day. Start by walking a little more than you did the day before. Bit by bit, increase the amount you walk. Walking boosts blood flow and helps prevent pneumonia and constipation. Walking may also decrease your muscle soreness after surgery. · Avoid lifting anything that would make you strain. This may include grocery bags and milk containers, a heavy briefcase or backpack, cat litter or dog food bags, a vacuum , or a child. · Avoid strenuous activities, such as bicycle riding, jogging, weightlifting, or aerobic exercise, until your doctor says it is okay. · Ask your doctor when you can drive again. · Avoid taking long car trips for 2 to 4 weeks after surgery. Your neck may become tired and painful from sitting too long in one position. · Your time off from work depends on how quickly you feel better and on the type of work you do. If you work in an office, you likely can go back to work sooner than if you have a job where you are very active. Talk with your doctor about your work needs. · You may have sex as soon as you feel able, but avoid positions that put stress on your neck or cause pain. Diet · You can eat your normal diet. If your stomach is upset, try bland, low-fat foods like plain rice, broiled chicken, toast, and yogurt. · Drink plenty of fluids (unless your doctor tells you not to). · You may notice that your bowel movements are not regular right after your surgery. This is common. Try to avoid constipation and straining with bowel movements. You may want to take a fiber supplement every day. If you have not had a bowel movement after a couple of days, ask your doctor about taking a mild laxative. Medicines · Be safe with medicines. Take pain medicines exactly as directed. ¨ If the doctor gave you a prescription medicine for pain, take it as prescribed. ¨ If you are not taking a prescription pain medicine, ask your doctor if you can take an over-the-counter medicine. · If your doctor prescribed antibiotics, take them as directed. Do not stop taking them just because you feel better. You need to take the full course of antibiotics. · If you think your pain medicine is making you sick to your stomach: 
¨ Take your medicine after meals (unless your doctor has told you not to). ¨ Ask your doctor for a different pain medicine. Incision care · If you have strips of tape on the cut (incision) the doctor made, leave the tape on for a week or until it falls off. · Wash the area daily with warm, soapy water, and pat it dry. Don't use hydrogen peroxide or alcohol, which can slow healing. You may cover the area with a gauze bandage if it weeps or rubs against clothing. Change the dressing every day. · Keep the area clean and dry. Exercise · Do exercises as instructed by your doctor or physical therapist to improve your strength and flexibility. Other instructions · To reduce stiffness and help sore muscles, use a warm water bottle, a heating pad set on low, or a warm cloth on your neck. Do not put heat right over the incision. Do not go to sleep with a heating pad on your skin. Follow-up care is a key part of your treatment and safety. Be sure to make and go to all appointments, and call your doctor if you are having problems. It's also a good idea to know your test results and keep a list of the medicines you take. When should you call for help? Call 911 anytime you think you may need emergency care. For example, call if: 
· You passed out (lost consciousness). · You have sudden chest pain and shortness of breath, or you cough up blood. · You cannot swallow. · You have severe pain in your neck or back. · You are unable to move an arm or a leg at all. Call your doctor now or seek immediate medical care if: · You have pain that does not get better after you take pain pills. · You have loose stitches, or your incision comes open. · You have blood or fluid draining from the incision. · You have signs of infection, such as: 
¨ Increased pain, swelling, warmth, or redness. ¨ Red streaks leading from the site. ¨ Pus draining from the site. ¨ Swollen lymph nodes in your neck or armpits. ¨ A fever. · You have new or worse symptoms in your arms, legs, chest, belly, or buttocks. Symptoms may include: ¨ Numbness or tingling. ¨ Weakness. ¨ Pain. · You lose bladder or bowel control. Watch closely for any changes in your health, and be sure to contact your doctor if: 
· You do not have a bowel movement after taking a laxative. · You are not getting better as expected. Where can you learn more? Go to http://denzel-tor.info/. Enter R489 in the search box to learn more about \"Cervical Discectomy: What to Expect at Home. \" Current as of: March 21, 2017 Content Version: 11.3 © 5840-9718 [employer]. Care instructions adapted under license by [employer] (which disclaims liability or warranty for this information). If you have questions about a medical condition or this instruction, always ask your healthcare professional. [address] any warranty or liability for your use of this information. 
  
Patient armband removed and shredded Laser Viewhart Activation Thank you for requesting access to [email or web address]. Please follow the instructions below to securely access and download your online medical record. [email or web address] allows you to send messages to your doctor, view your test results, renew your prescriptions, schedule appointments, and more. How Do I Sign Up? 1. In your internet browser, go to [email or web address] 
2. Click on the First Time User? Click Here link in the Sign In box. You will be redirect to the New Member Sign Up page. 3. Enter your Sotera Wirelesst Access Code exactly as it appears below. You will not need to use this code after youve completed the sign-up process. If you do not sign up before the expiration date, you must request a new code. Cerenis Therapeuticshart Access Code: Activation code not generated Current Nonabox Status: Patient Declined (This is the date your MyChart access code will ) 4. Enter the last four digits of your Social Security Number (xxxx) and Date of Birth (mm/dd/yyyy) as indicated and click Submit. You will be taken to the next sign-up page. 5. Create a Sotera Wirelesst ID. This will be your Nonabox login ID and cannot be changed, so think of one that is secure and easy to remember. 6. Create a Sotera Wirelesst password. You can change your password at any time. 7. Enter your Password Reset Question and Answer. This can be used at a later time if you forget your password. 8. Enter your e-mail address. You will receive e-mail notification when new information is available in [address]. 9. Click Sign Up. You can now view and download portions of your medical record. 10. Click the Download Summary menu link to download a portable copy of your medical information. Additional Information If you have questions, please visit the Frequently Asked Questions section of the Nonabox website at [email or web address] Remember, Nonabox is NOT to be used for urgent needs. For medical emergencies, dial 911. DISCHARGE SUMMARY from Nurse The following personal items are in your possession at time of discharge: 
 
Dental Appliances:  (none with pt.) Visual Aid: Glasses (to read) Home Medications: None Jewelry: None Clothing: Pants, Shirt, Footwear Other Valuables: None PATIENT INSTRUCTIONS: 
 
 
F-face looks uneven A-arms unable to move or move unevenly S-speech slurred or non-existent T-time-call 911 as soon as signs and symptoms begin-DO NOT go Back to bed or wait to see if you get better-TIME IS BRAIN. Warning Signs of HEART ATTACK Call 911 if you have these symptoms: 
? Chest discomfort. Most heart attacks involve discomfort in the center of the chest that lasts more than a few minutes, or that goes away and comes back. It can feel like uncomfortable pressure, squeezing, fullness, or pain. ? Discomfort in other areas of the upper body. Symptoms can include pain or discomfort in one or both arms, the back, neck, jaw, or stomach. ? Shortness of breath with or without chest discomfort. ? Other signs may include breaking out in a cold sweat, nausea, or lightheadedness. Don't wait more than five minutes to call [address]! Fast action can save your life. Calling 911 is almost always the fastest way to get lifesaving treatment. Emergency Medical Services staff can begin treatment when they arrive  up to an hour sooner than if someone gets to the hospital by car. The discharge information has been reviewed with the patient. The patient verbalized understanding. Discharge medications reviewed with the patient and appropriate educational materials and side effects teaching were provided. Discharge Instructions Attachments/References OXYCODONE/ACETAMINOPHEN (BY MOUTH) (ENGLISH) Discharge Orders None [facility] Announcement We are excited to announce that we are making your provider's discharge notes available to you in Taste Guru.   You will see these notes when they are completed and signed by the physician that discharged you from your recent hospital stay. If you have any questions or concerns about any information you see in Thomas-Krennhart, please call the Health Information Department where you were seen or reach out to your Primary Care Provider for more information about your plan of care. General Information Please provide this summary of care documentation to your next provider. Patient Signature:  ____________________________________________________________ Date:  ____________________________________________________________  
  
[facility] Provider Signature:  ____________________________________________________________ Date:  ____________________________________________________________ More Information Oxycodone/Acetaminophen (By mouth) Acetaminophen (x-hckb-e-MIN-oh-fen), Oxycodone Hydrochloride (al-n-HZZ-done wali-droe-KLOR-melly) Treats moderate to moderately severe pain. This medicine is a narcotic pain reliever. Brand Name(s): Endocet, Percocet, Primlev, Roxicet, Xartemis XR There may be other brand names for this medicine. When This Medicine Should Not Be Used: This medicine is not right for everyone. Do not use it if you had an allergic reaction to acetaminophen or oxycodone, or if you have serious breathing problems or paralytic ileus. How to Use This Medicine:  
Capsule, Liquid, Tablet, Long Acting Tablet · Your doctor will tell you how much medicine to use. Do not use more than directed. · An overdose can be dangerous. Follow directions carefully so you do not get too much medicine at one time. · Oral liquid: Measure the oral liquid medicine with a marked measuring spoon, oral syringe, or medicine cup. · Swallow the extended-release tablet whole. Do not crush, break, or chew it. Do not lick or wet the tablet before placing it in your mouth. Do not give this medicine through a feeding tube. · This medicine should come with a Medication Guide.  Ask your pharmacist for a copy if you do not have one. · Missed dose: If you miss a dose of this medicine, skip the missed dose and go back to your regular dosing schedule. Do not double doses. · Store the medicine in a closed container at room temperature, away from heat, moisture, and direct light. Ask your pharmacist about the best way to dispose of medicine you do not use. Drugs and Foods to Avoid: Ask your doctor or pharmacist before using any other medicine, including over-the-counter medicines, vitamins, and herbal products. · Do not use Xartemis XR if you are using or have used an MAO inhibitor in the past 14 days. · Some medicines can affect how this medicine works. Tell your doctor if you are using any of the following: ¨ Carbamazepine, erythromycin, ketoconazole, lamotrigine, mirtazapine, naltrexone, phenytoin, propranolol, rifampin, ritonavir, tramadol, trazodone, or zidovudine ¨ Birth control pills ¨ Diuretic (water pill) ¨ Medicine to treat depression ¨ Phenothiazine medicine ¨ Triptan medicine to treat migraine headaches · Do not drink alcohol while you are using this medicine. Acetaminophen can damage your liver, and alcohol can increase this risk. Do not take acetaminophen without asking your doctor if you have 3 or more drinks of alcohol every day. · Tell your doctor if you use anything else that makes you sleepy. Some examples are allergy medicine, narcotic pain medicine, and alcohol. Tell your doctor if you are using buprenorphine, butorphanol, nalbuphine, pentazocine, a benzodiazepine, or a muscle relaxer. Warnings While Using This Medicine: · Tell your doctor if you are pregnant or breastfeeding, or if you have kidney disease, liver disease, heart disease, low blood pressure, breathing problems or lung disease (such as asthma, COPD), thyroid problems, Henry disease, pancreas or gallbladder problems, prostate problems, trouble urinating, or a stomach problems, or a history of head injury or brain damage, seizures, or alcohol or drug abuse. Tell your doctor if you are allergic to codeine. · This medicine may cause the following problems: 
¨ High risk of overdose, which can lead to death ¨ Respiratory depression (serious breathing problem that can be life-threatening) ¨ Liver problems ¨ Serious skin reactions ¨ Serotonin syndrome (when used with certain medicines) · This medicine may make you dizzy or drowsy. Do not drive or do anything that could be dangerous until you know how this medicine affects you. Sit or lie down if you feel dizzy. Stand up carefully. · This medicine contains acetaminophen. Read the labels of all other medicines you are using to see if they also contain acetaminophen, or ask your doctor or pharmacist. Corinne Leys not use more than 4 grams (4,000 milligrams) total of acetaminophen in one day. · This medicine can be habit-forming. Do not use more than your prescribed dose. Call your doctor if you think your medicine is not working. · Do not stop using this medicine suddenly. Your doctor will need to slowly decrease your dose before you stop it completely. · This medicine could cause infertility. Talk with your doctor before using this medicine if you plan to have children. · This medicine may cause constipation, especially with long-term use. Ask your doctor if you should use a laxative to prevent and treat constipation. · Keep all medicine out of the reach of children. Never share your medicine with anyone. Possible Side Effects While Using This Medicine:  
Call your doctor right away if you notice any of these side effects: · Allergic reaction: Itching or hives, swelling in your face or hands, swelling or tingling in your mouth or throat, chest tightness, trouble breathing · Anxiety, restlessness, fast heartbeat, fever, muscle spasms, twitching, diarrhea, seeing or hearing things that are not there · Blistering, peeling, red skin rash · Blue lips, fingernails, or skin · Dark urine or pale stools, loss of appetite, stomach pain, yellow skin or eyes · Extreme weakness, shallow breathing, uneven heartbeat, seizures, sweating, or cold or clammy skin · Severe confusion, lightheadedness, dizziness, or fainting · Severe constipation, nausea, or vomiting · Trouble breathing or slow breathing If you notice these less serious side effects, talk with your doctor:  
· Headache · Mild constipation, nausea, or vomiting · Mild sleepiness or drowsiness If you notice other side effects that you think are caused by this medicine, tell your doctor. Call your doctor for medical advice about side effects. You may report side effects to FDA at 2-865-FDA-4476 © 2017 [address] Information is for End User's use only and may not be sold, redistributed or otherwise used for commercial purposes. The above information is an  only. It is not intended as medical advice for individual conditions or treatments. Talk to your doctor, nurse or pharmacist before following any medical regimen to see if it is safe and effective for you.

## 2017-08-30 NOTE — ANESTHESIA PREPROCEDURE EVALUATION
Anesthetic History               Review of Systems / Medical History  Patient summary reviewed and pertinent labs reviewed    Pulmonary                   Neuro/Psych   Within defined limits           Cardiovascular    Hypertension              Exercise tolerance: >4 METS     GI/Hepatic/Renal     GERD: well controlled           Endo/Other    Diabetes: type 2         Other Findings   Comments:   Risk Factors for Postoperative nausea/vomiting:       History of postoperative nausea/vomiting? NO       Female? YES       Motion sickness? NO       Intended opioid administration for postoperative analgesia? YES      Smoking Abstinence  Current Smoker? NO  Elective Surgery? YES  Seen preoperatively by anesthesiologist or proxy prior to day of surgery? YES  Pt abstained from smoking 24 hours prior to anesthesia?  N/A           Physical Exam    Airway  Mallampati: II  TM Distance: 4 - 6 cm  Neck ROM: normal range of motion   Mouth opening: Normal     Cardiovascular               Dental  No notable dental hx       Pulmonary  Breath sounds clear to auscultation               Abdominal  GI exam deferred       Other Findings            Anesthetic Plan    ASA: 3  Anesthesia type: general          Induction: Intravenous  Anesthetic plan and risks discussed with: Patient

## 2017-08-30 NOTE — ANESTHESIA POSTPROCEDURE EVALUATION
Post-Anesthesia Evaluation and Assessment    Patient: Jazmine Bowman MRN: [id number]  SSN: [id number]    YOB: 1960  Age: 62 y.o. Sex: female       Cardiovascular Function/Vital Signs  Visit Vitals    /86    Pulse 60    Temp 36.2 °C (97.2 °F)    Resp 16    Ht 5' 4\" (1.626 m)    Wt 92.5 kg (204 lb)    SpO2 100%    BMI 35.02 kg/m2       Patient is status post general anesthesia for Procedure(s):  ANTERIOR CERVICAL DISCECTOMY WITH FUSION C7/T1 . Nausea/Vomiting: None    Postoperative hydration reviewed and adequate. Pain:  Pain Scale 1: Visual (08/30/17 1145)  Pain Intensity 1: 0 (08/30/17 1145)   Managed    Neurological Status:   Neuro (WDL): Within Defined Limits (08/30/17 0748)  Neuro  LUE Motor Response: Weak (08/30/17 1100)  LLE Motor Response: Weak (08/30/17 1100)  RUE Motor Response: Weak (08/30/17 1100)  RLE Motor Response: Weak (08/30/17 1100)   At baseline    Mental Status and Level of Consciousness: Arousable    Pulmonary Status:   O2 Device: Nasal cannula (08/30/17 1120)   Adequate oxygenation and airway patent    Complications related to anesthesia: None    Post-anesthesia assessment completed.  No concerns    Signed By: Alex Prasad MD     August 30, 2017

## 2017-08-31 VITALS
HEART RATE: 72 BPM | SYSTOLIC BLOOD PRESSURE: 136 MMHG | RESPIRATION RATE: 16 BRPM | BODY MASS INDEX: 34.83 KG/M2 | DIASTOLIC BLOOD PRESSURE: 86 MMHG | TEMPERATURE: 98.1 F | OXYGEN SATURATION: 98 % | HEIGHT: 64 IN | WEIGHT: 204 LBS

## 2017-08-31 LAB — GLUCOSE BLD STRIP.AUTO-MCNC: 123 MG/DL (ref 70–110)

## 2017-08-31 PROCEDURE — 97116 GAIT TRAINING THERAPY: CPT

## 2017-08-31 PROCEDURE — 82962 GLUCOSE BLOOD TEST: CPT

## 2017-08-31 PROCEDURE — 74011250637 HC RX REV CODE- 250/637: Performed by: ORTHOPAEDIC SURGERY

## 2017-08-31 PROCEDURE — 74011250636 HC RX REV CODE- 250/636: Performed by: NURSE ANESTHETIST, CERTIFIED REGISTERED

## 2017-08-31 PROCEDURE — 74011250636 HC RX REV CODE- 250/636: Performed by: ORTHOPAEDIC SURGERY

## 2017-08-31 PROCEDURE — 97535 SELF CARE MNGMENT TRAINING: CPT

## 2017-08-31 PROCEDURE — 97165 OT EVAL LOW COMPLEX 30 MIN: CPT

## 2017-08-31 PROCEDURE — 99218 HC RM OBSERVATION: CPT

## 2017-08-31 RX ORDER — OXYCODONE AND ACETAMINOPHEN 10; 325 MG/1; MG/1
1 TABLET ORAL
Qty: 30 TAB | Refills: 0 | Status: SHIPPED | OUTPATIENT
Start: 2017-08-31 | End: 2018-01-16 | Stop reason: ALTCHOICE

## 2017-08-31 RX ADMIN — HYDROCHLOROTHIAZIDE 25 MG: 25 TABLET ORAL at 08:41

## 2017-08-31 RX ADMIN — SIMVASTATIN 10 MG: 20 TABLET, FILM COATED ORAL at 08:41

## 2017-08-31 RX ADMIN — GABAPENTIN 100 MG: 100 CAPSULE ORAL at 08:41

## 2017-08-31 RX ADMIN — HYDRALAZINE HYDROCHLORIDE 10 MG: 20 INJECTION INTRAMUSCULAR; INTRAVENOUS at 05:14

## 2017-08-31 RX ADMIN — CEFAZOLIN SODIUM 2 G: 2 SOLUTION INTRAVENOUS at 08:37

## 2017-08-31 RX ADMIN — CEFAZOLIN SODIUM 2 G: 2 SOLUTION INTRAVENOUS at 00:42

## 2017-08-31 RX ADMIN — Medication 10 ML: at 08:42

## 2017-08-31 RX ADMIN — OXYCODONE HYDROCHLORIDE AND ACETAMINOPHEN 1 TABLET: 10; 325 TABLET ORAL at 08:50

## 2017-08-31 RX ADMIN — PANTOPRAZOLE SODIUM 40 MG: 40 TABLET, DELAYED RELEASE ORAL at 08:41

## 2017-08-31 NOTE — PROGRESS NOTES
Care Management Interventions  PCP Verified by CM: Yes  Mode of Transport at Discharge:  (family)  Transition of Care Consult (CM Consult): [address]: No  Reason Outside [place]: Out of service area  Physical Therapy Consult: Yes  Occupational Therapy Consult: Yes  Current Support Network: Lives with Spouse  Confirm Follow Up Transport: Family  Plan discussed with Pt/Family/Caregiver: Yes  Freedom of Choice Offered: Yes (home health)  Discharge Location  Discharge Placement: Home with home health    Pt is a 62year old admitted for cervical stenosis of spine. Pt is alert and oriented and alone in room. Pt reports that she lives with her spouse Carrington Garza [phone] at [facility] [address]. Pt reports that prior to admission she was independent in her ADLs and that she has no DME at home. Pt has transportation home today. Pt is not sure if she wants home health because she is not sure if she can afford it. Pt encouraged to call [employer] and ask them if she has a copay or if it is 100% covered. Pt states she does not have her card with her but plans to call it when she gets home. Pt gave permission to set up home health for her. [address] signed for [employer] in [employer] -phone [phone] and fax [phone]. Talked to [address] [place], [place] [address]nin [place] and they confirmed that they can provide skilled care. Home Health order and Dr Simba Espinoza protocol faxed. Spoke to Marci Harrison who states she will have to go to the office and check order and then will call CM back. OBS form signed. Original placed in chart and copy given to pt.     [facility] is unable to accept pt due to they do not provide skilled care. They suggested that [facility] [phone] may be able to provide skilled care for pt. Attempted to call [facility] and got a busy signal. Attempted to call pt and LVM for pt call CM back.

## 2017-08-31 NOTE — PROGRESS NOTES
Problem: Mobility Impaired (Adult and Pediatric)  Goal: *Acute Goals and Plan of Care (Insert Text)  Physical Therapy Goals  Initiated 8/30/2017 and to be accomplished within 7 day(s)  1. Patient will move from supine to sit and sit to supine , scoot up and down and roll side to side in bed with supervision/set-up. 2. Patient will transfer from bed to chair and chair to bed with supervision/set-up using the least restrictive device. 3. Patient will perform sit to stand with supervision/set-up. 4. Patient will ambulate with supervision/set-up for >200 feet with the least restrictive device. 5. Patient will ascend/descend 5 stairs with handrail(s) with minimal assistance/contact guard assist.   Outcome: Progressing Towards Goal  PHYSICAL THERAPY TREATMENT     Patient: Omar Simmons (40 y.o. female)  Date: 8/31/2017  Diagnosis: Cervical stenosis of spine [M48.02] <principal problem not specified>  Procedure(s) (LRB):  ANTERIOR CERVICAL DISCECTOMY WITH FUSION C7/T1  (N/A) 1 Day Post-Op  Precautions:  (cervical)  Chart, physical therapy assessment, plan of care and goals were reviewed. ASSESSMENT:  Pt slightly drowsy this afternoon from recent pain meds, but willing to participate in gait training. Instructed pt in positioning recommendations, bed mobility and pacing with mobility and transfers to reduce fall risk particularly while drowsy from meds. Educated pt and spouse on safety measures in the home, precautions. Pt able to participate in gait training X60 ft without AD CGA/SBA with widened BLAIRE but no LOB. Progression toward goals:  [X]      Improving appropriately and progressing toward goals  [ ]      Improving slowly and progressing toward goals  [ ]      Not making progress toward goals and plan of care will be adjusted       PLAN:  Patient continues to benefit from skilled intervention to address the above impairments. Continue treatment per established plan of care.   Discharge Recommendations: Home Health  Further Equipment Recommendations for Discharge:  N/A       SUBJECTIVE:   Patient stated I think the anesthesia isnt out of my system yet.   Mobility [id number] Current  CI= 1-19%   Goal  CI= 1-19%. The severity rating is based on the Other Level of assist required for functional mobility  OBJECTIVE DATA SUMMARY:   Critical Behavior:  Neurologic State: Alert  Orientation Level: Oriented X4  Cognition: Appropriate decision making, Appropriate for age attention/concentration, Appropriate safety awareness, Follows commands  Safety/Judgement: Fall prevention, Decreased insight into deficits  Functional Mobility Training:  Bed Mobility:  Rolling: Modified independent  Supine to Sit: Modified independent  Scooting: Modified independent  Transfers:  Sit to Stand: Supervision  Stand to Sit: Supervision  Balance:  Sitting: Intact  Standing: Intact  Ambulation/Gait Training:  Distance (ft): 60 Feet (ft)  Ambulation - Level of Assistance: Contact guard assistance  Gait Abnormalities: Decreased step clearance  Base of Support: Widened  Speed/Renita: Pace decreased (<100 feet/min)  Pain:  Pt pain was reported as  7 pre-treatment. Pt pain was reported as 7 post-treatment. Intervention: position     Activity Tolerance:   fair  Please refer to the flowsheet for vital signs taken during this treatment.   After treatment:   [ ] Patient left in no apparent distress sitting up in chair  [X] Patient left in no apparent distress in bed  [X] Call bell left within reach  [ ] Nursing notified  [X] Caregiver present  [ ] Bed alarm activated      Andre Lombardi PTA   Time Calculation: 20 mins

## 2017-08-31 NOTE — DISCHARGE INSTRUCTIONS
Cervical Discectomy: What to Expect at [address] can expect your neck to feel stiff or sore after surgery. This should improve in the weeks after surgery. You may have trouble sitting or standing in one position for very long and may need pain medicine in the weeks after your surgery. It may take up to 8 weeks to get back to your usual activities, but it may depend on what kind of surgery you had. Your doctor may advise you to work with a physical therapist to strengthen the muscles around your neck and back. This care sheet gives you a general idea about how long it will take for you to recover. But each person recovers at a different pace. Follow the steps below to get better as quickly as possible. How can you care for yourself at home? Activity  · Rest when you feel tired. Getting enough sleep will help you recover. · Try to walk each day. Start by walking a little more than you did the day before. Bit by bit, increase the amount you walk. Walking boosts blood flow and helps prevent pneumonia and constipation. Walking may also decrease your muscle soreness after surgery. · Avoid lifting anything that would make you strain. This may include grocery bags and milk containers, a heavy briefcase or backpack, cat litter or dog food bags, a vacuum , or a child. · Avoid strenuous activities, such as bicycle riding, jogging, weightlifting, or aerobic exercise, until your doctor says it is okay. · Ask your doctor when you can drive again. · Avoid taking long car trips for 2 to 4 weeks after surgery. Your neck may become tired and painful from sitting too long in one position. · Your time off from work depends on how quickly you feel better and on the type of work you do. If you work in an office, you likely can go back to work sooner than if you have a job where you are very active. Talk with your doctor about your work needs.   · You may have sex as soon as you feel able, but avoid positions that put stress on your neck or cause pain. Diet  · You can eat your normal diet. If your stomach is upset, try bland, low-fat foods like plain rice, broiled chicken, toast, and yogurt. · Drink plenty of fluids (unless your doctor tells you not to). · You may notice that your bowel movements are not regular right after your surgery. This is common. Try to avoid constipation and straining with bowel movements. You may want to take a fiber supplement every day. If you have not had a bowel movement after a couple of days, ask your doctor about taking a mild laxative. Medicines  · Be safe with medicines. Take pain medicines exactly as directed. ¨ If the doctor gave you a prescription medicine for pain, take it as prescribed. ¨ If you are not taking a prescription pain medicine, ask your doctor if you can take an over-the-counter medicine. · If your doctor prescribed antibiotics, take them as directed. Do not stop taking them just because you feel better. You need to take the full course of antibiotics. · If you think your pain medicine is making you sick to your stomach:  ¨ Take your medicine after meals (unless your doctor has told you not to). ¨ Ask your doctor for a different pain medicine. Incision care  · If you have strips of tape on the cut (incision) the doctor made, leave the tape on for a week or until it falls off. · Wash the area daily with warm, soapy water, and pat it dry. Don't use hydrogen peroxide or alcohol, which can slow healing. You may cover the area with a gauze bandage if it weeps or rubs against clothing. Change the dressing every day. · Keep the area clean and dry. Exercise  · Do exercises as instructed by your doctor or physical therapist to improve your strength and flexibility. Other instructions  · To reduce stiffness and help sore muscles, use a warm water bottle, a heating pad set on low, or a warm cloth on your neck. Do not put heat right over the incision.  Do not go to sleep with a heating pad on your skin. Follow-up care is a key part of your treatment and safety. Be sure to make and go to all appointments, and call your doctor if you are having problems. It's also a good idea to know your test results and keep a list of the medicines you take. When should you call for help? Call 911 anytime you think you may need emergency care. For example, call if:  · You passed out (lost consciousness). · You have sudden chest pain and shortness of breath, or you cough up blood. · You cannot swallow. · You have severe pain in your neck or back. · You are unable to move an arm or a leg at all. Call your doctor now or seek immediate medical care if:  · You have pain that does not get better after you take pain pills. · You have loose stitches, or your incision comes open. · You have blood or fluid draining from the incision. · You have signs of infection, such as:  ¨ Increased pain, swelling, warmth, or redness. ¨ Red streaks leading from the site. ¨ Pus draining from the site. ¨ Swollen lymph nodes in your neck or armpits. ¨ A fever. · You have new or worse symptoms in your arms, legs, chest, belly, or buttocks. Symptoms may include:  ¨ Numbness or tingling. ¨ Weakness. ¨ Pain. · You lose bladder or bowel control. Watch closely for any changes in your health, and be sure to contact your doctor if:  · You do not have a bowel movement after taking a laxative. · You are not getting better as expected. Where can you learn more? Go to http://denzel-tor.info/. Enter R489 in the search box to learn more about \"Cervical Discectomy: What to Expect at Home. \"  Current as of: March 21, 2017  Content Version: 11.3  © 8005-9599 [employer]. Care instructions adapted under license by [employer] (which disclaims liability or warranty for this information).  If you have questions about a medical condition or this instruction, always ask your healthcare professional. [address] any warranty or liability for your use of this information.     Patient armband removed and shredded  MyChart Activation    Thank you for requesting access to Ra[employer]. Please follow the instructions below to securely access and download your online medical record. Raise5 allows you to send messages to your doctor, view your test results, renew your prescriptions, schedule appointments, and more. How Do I Sign Up? 1. In your internet browser, go to [email or web address].Near [email or web address]  2. Click on the First Time User? Click Here link in the Sign In box. You will be redirect to the New Member Sign Up page. 3. Enter your Raise5 Access Code exactly as it appears below. You will not need to use this code after youve completed the sign-up process. If you do not sign up before the expiration date, you must request a new code. Raise5 Access Code: Activation code not generated  Current Raise5 Status: Patient Declined (This is the date your Raise5 access code will )    4. Enter the last four digits of your Social Security Number (xxxx) and Date of Birth (mm/dd/yyyy) as indicated and click Submit. You will be taken to the next sign-up page. 5. Create a Raise5 ID. This will be your Raise5 login ID and cannot be changed, so think of one that is secure and easy to remember. 6. Create a Raise5 password. You can change your password at any time. 7. Enter your Password Reset Question and Answer. This can be used at a later time if you forget your password. 8. Enter your e-mail address. You will receive e-mail notification when new information is available in [address] 9. Click Sign Up. You can now view and download portions of your medical record. 10. Click the Download Summary menu link to download a portable copy of your medical information.     Additional Information    If you have questions, please visit the Frequently Asked Questions section of the [employer] website at [email or web address] Remember, MyChart is NOT to be used for urgent needs. For medical emergencies, dial 911. DISCHARGE SUMMARY from Nurse    The following personal items are in your possession at time of discharge:    Dental Appliances:  (none with pt.)  Visual Aid: Glasses (to read)     Home Medications: None  Jewelry: None  Clothing: Pants, Shirt, Footwear  Other Valuables: None             PATIENT INSTRUCTIONS:    After general anesthesia or intravenous sedation, for 24 hours or while taking prescription Narcotics:  · Limit your activities  · Do not drive and operate hazardous machinery  · Do not make important personal or business decisions  · Do  not drink alcoholic beverages  · If you have not urinated within 8 hours after discharge, please contact your surgeon on call. Report the following to your surgeon:  · Excessive pain, swelling, redness or odor of or around the surgical area  · Temperature over 100.5  · Nausea and vomiting lasting longer than 4 hours or if unable to take medications  · Any signs of decreased circulation or nerve impairment to extremity: change in color, persistent  numbness, tingling, coldness or increase pain  · Any questions        What to do at Home:  Recommended activity: Activity as tolerated      If you experience any of the following symptoms uncontrolled pain, weakness, numbness, nausea, vomiting, diarrhea, constipation, fever greater than 100.5 please follow up with PCP. *  Please give a list of your current medications to your Primary Care Provider. *  Please update this list whenever your medications are discontinued, doses are      changed, or new medications (including over-the-counter products) are added. *  Please carry medication information at all times in case of emergency situations.           These are general instructions for a healthy lifestyle:    No smoking/ No tobacco products/ Avoid exposure to second hand smoke    Surgeon General's Warning:  Quitting smoking now greatly reduces serious risk to your health. Obesity, smoking, and sedentary lifestyle greatly increases your risk for illness    A healthy diet, regular physical exercise & weight monitoring are important for maintaining a healthy lifestyle    You may be retaining fluid if you have a history of heart failure or if you experience any of the following symptoms:  Weight gain of 3 pounds or more overnight or 5 pounds in a week, increased swelling in our hands or feet or shortness of breath while lying flat in bed. Please call your doctor as soon as you notice any of these symptoms; do not wait until your next office visit. Recognize signs and symptoms of STROKE:    F-face looks uneven    A-arms unable to move or move unevenly    S-speech slurred or non-existent    T-time-call 911 as soon as signs and symptoms begin-DO NOT go       Back to bed or wait to see if you get better-TIME IS BRAIN. Warning Signs of HEART ATTACK     Call 911 if you have these symptoms:   Chest discomfort. Most heart attacks involve discomfort in the center of the chest that lasts more than a few minutes, or that goes away and comes back. It can feel like uncomfortable pressure, squeezing, fullness, or pain.  Discomfort in other areas of the upper body. Symptoms can include pain or discomfort in one or both arms, the back, neck, jaw, or stomach.  Shortness of breath with or without chest discomfort.  Other signs may include breaking out in a cold sweat, nausea, or lightheadedness. Don't wait more than five minutes to call 911 - MINUTES MATTER! Fast action can save your life. Calling 911 is almost always the fastest way to get lifesaving treatment. Emergency Medical Services staff can begin treatment when they arrive -- up to an hour sooner than if someone gets to the hospital by car. The discharge information has been reviewed with the patient.   The patient verbalized understanding. Discharge medications reviewed with the patient and appropriate educational materials and side effects teaching were provided.

## 2017-08-31 NOTE — ROUTINE PROCESS
Pt is in stable condition. Alert and oriented x 4. VSS. Denies any acute distress, chest pain or SOB. Dressing to Anterior Neck with Opsite is CDI. Neck collar in place for comfort. Pt has + pulses, +sensation, Skin is warm to touch. Pt is able to move all extremities, Pt denies dizziness or headache, difficulty swallowing. Pt denies tingling and numbness. Cap refill <3 sec. Verbalizes No further needs. Call bell is within reach, bed in low position. Will continue to monitor.

## 2017-08-31 NOTE — PROGRESS NOTES
Problem: Self Care Deficits Care Plan (Adult)  Goal: *Acute Goals and Plan of Care (Insert Text)  Occupational Therapy Goals  Initiated 8/31/2017     1. Patient will perform upper body dressing with supervision/set-up with adaptive strategies. -achieved 8/31/17  Outcome: Resolved/Met Date Met:  08/31/17  OCCUPATIONAL THERAPY EVALUATION/DISCHARGE     Patient: Zeferino Padgett (83 y.o. female)  Date: 8/31/2017  Primary Diagnosis: Cervical stenosis of spine [M48.02]  Procedure(s) (LRB):  ANTERIOR CERVICAL DISCECTOMY WITH FUSION C7/T1  (N/A) 1 Day Post-Op   Precautions:Fall (cervical)      ASSESSMENT AND RECOMMENDATIONS:  Based on the objective data described below, the patient needed SBA/CGA with functional mobility/ toilet transfer with no AD/HHA and with self care tasks. Patient had decreased, but functional BUE AROM, secondary to cervical precautions, and decreased  strength(L>R). Educated patient on cervical precautions as it relates to function; patient needed min verbal cues to recall and was independent with implementing them during functional tasks. Educated patient on adaptive strategies during UE/LE dressing while adhering to cervical precautions; patient reported/demonstrated understanding with supervision. Patient noted to have decreased strength in R hand/intrinsics; patient educated on R hand exercises to increase strength and decrease edema; patient reported/demonstrated understanding. Patient left comfortable in chair. Patient deferred to PT for mobility. Skilled acute care occupational therapy is not indicated at this time. Discharge Recommendations: Home Health  Further Equipment Recommendations for Discharge: shower chair       Barriers to Learning/Limitations: None      COMPLEXITY      Eval Complexity: History: LOW Complexity : Brief history review ;  Examination: LOW Complexity : 1-3 performance deficits relating to physical, cognitive , or psychosocial skils that result in activity limitations and / or participation restrictions ; Decision Making:LOW Complexity : No comorbidities that affect functional and no verbal or physical assistance needed to complete eval tasks  Assessment: Low Complexity          G-CODES:      Self Care  Current  CI= 1-19%   Goal  CI= 1-19%   D/C  CI= 1-19%. The severity rating is based on the Level of Assistance required for Functional Mobility and ADLs. SUBJECTIVE:   Patient stated Zackary Bibber.       OBJECTIVE DATA SUMMARY:       Past Medical History:   Diagnosis Date    Diabetes ([facility] [address]      High cholesterol      Hypertension       Past Surgical History:   Procedure Laterality Date    HX BREAST BIOPSY        HX CHOLECYSTECTOMY        HX HERNIA REPAIR         Prior Level of Function/Home Situation: Patient reported she was independent in basic self care tasks and functional mobility PTA. Home Situation  Home Environment: Private residence  # Steps to Enter: 5  Rails to Enter: Yes  Hand Rails : Bilateral  One/Two Story Residence: One story  Living Alone: No  Support Systems: Spouse/Significant Other/Partner  Patient Expects to be Discharged to[de-identified] Private residence  Current DME Used/Available at Home: None  Tub or Shower Type: Tub/Shower combination  [X]     Right hand dominant       [ ]     Left hand dominant  Cognitive/Behavioral Status:  Neurologic State: Alert  Orientation Level: Oriented X4  Cognition: Follows commands     Skin: No skin changes noted     Edema: 1+ edema R hand; educated patient on edema management      Vision/Perceptual:       Acuity: Within Defined Limits       Coordination:    No coordination deficits noted     Balance:  Sitting: Intact  Standing: Intact; With support     Strength:  Strength: Generally decreased, functional ( strength 5/5 L , 3+/5 R )     Tone & Sensation:  Tone: Normal  Sensation: Intact     Range of Motion:  AROM: Generally decreased, functional (BUEs within cervical precautions) Functional Mobility and Transfers for ADLs:  Bed Mobility:  Patient sitting on EOB with nursing upon arrival  Scooting: Supervision  Transfers:  Sit to Stand: Supervision              Toilet Transfer : Stand-by asssistance;Contact guard assistance                 ADL Assessment:(clincial judgement based on functional mobility)  Feeding: Independent     Oral Facial Hygiene/Grooming: Stand-by assistance     Bathing: Stand-by assistance     Upper Body Dressing: Supervision (adaptive strategies)     Lower Body Dressing: Supervision     Toileting: Stand by assistance     Pain:  Pt reports 7/10 pain or discomfort prior to treatment. Pt reports 7/10 pain or discomfort post treatment. Activity Tolerance:   Good     Please refer to the flowsheet for vital signs taken during this treatment. After treatment:   [X]  Patient left in no apparent distress sitting up in chair  [ ]  Patient left in no apparent distress in bed  [X]  Call bell left within reach  [X]  Nursing notified  [ ]  Caregiver present  [ ]  Bed alarm activated      COMMUNICATION/EDUCATION:   Communication/Collaboration:  [X]      Home safety education was provided and the patient/caregiver indicated understanding. [X]      Patient/family have participated as able and agree with findings and recommendations. [ ]      Patient is unable to participate in plan of care at this time.      Saadia Hare OTR/L  Time Calculation: 29 mins

## 2017-08-31 NOTE — PROGRESS NOTES
Bedside shift change report given to Sukhdev Corona (oncoming nurse) by Harpal Parker (offgoing nurse). Report included the following information SBAR, Kardex, ED Summary, OR Summary, Procedure Summary, Intake/Output, MAR and Recent Results.

## 2017-08-31 NOTE — PROGRESS NOTES
Problem: Falls - Risk of  Goal: *Absence of Falls  Document Luis Miguel Fall Risk and appropriate interventions in the flowsheet.    Outcome: Progressing Towards Goal  Fall Risk Interventions:  Mobility Interventions: Patient to call before getting OOB           Medication Interventions: Bed/chair exit alarm

## 2017-08-31 NOTE — DISCHARGE SUMMARY
Discharge  Summary     Patient: Duong Velásquez MRN: [id number]  SSN: [id number]    YOB: 1960  Age: 62 y.o. Sex: female       Admit Date: 8/30/2017    Discharge Date: 8/31/2017      Admission Diagnoses: Cervical stenosis of spine [M48.02]    Discharge Diagnoses:   Problem List as of 8/31/2017  Date Reviewed: 8/29/2017          Codes Class Noted - Resolved    Cervical stenosis of spine ICD-10-CM: M48.02  ICD-9-CM: 723.0  8/30/2017 - Present        HNP (herniated nucleus pulposus), cervical ICD-10-CM: M50.20  ICD-9-CM: 722.0  8/2/2017 - Present        Cervical spondylosis without myelopathy ICD-10-CM: M47.812  ICD-9-CM: 721.0  8/2/2017 - Present        Cervical spinal stenosis ICD-10-CM: M48.02  ICD-9-CM: 723.0  8/2/2017 - Present        Cervical neuritis ICD-10-CM: M54.12  ICD-9-CM: 723.4  8/2/2017 - Present        Other cervical disc degeneration, unspecified cervical region ICD-10-CM: M50.30  ICD-9-CM: 722.4  8/2/2017 - Present               Discharge Condition: Good    Procedure: ACDF C7/T1    Hospital Course: Normal hospital course for this procedure. Tolerated surgical intervention well. VSS Throughout. Neuro intact. Incision dry and intact, tolerating PO intake, voiding adequately. Ambulatory. Disposition: home    Discharge Medications:   Current Discharge Medication List      START taking these medications    Details   oxyCODONE-acetaminophen (PERCOCET)  mg per tablet Take 1 Tab by mouth every six (6) hours as needed for Pain. Max Daily Amount: 4 Tabs. Indications: acute post op pain  Qty: 30 Tab, Refills: 0         CONTINUE these medications which have NOT CHANGED    Details   promethazine (PHENERGAN) 25 mg tablet Take 25 mg by mouth every six (6) hours as needed for Nausea.       simvastatin (ZOCOR) 10 mg tablet TAKE 1 TABLET BY MOUTH ONCE A DAY WITH DINNER      esomeprazole (NEXIUM) 40 mg capsule Take 40 mg by mouth.      gabapentin (NEURONTIN) 100 mg capsule Take 100 mg by mouth.      hydroCHLOROthiazide (HYDRODIURIL) 25 mg tablet Take 25 mg by mouth.      metFORMIN (GLUCOPHAGE) 1,000 mg tablet Take 1,000 mg by mouth.      glimepiride (AMARYL) 4 mg tablet Take 4 mg by mouth. VITAMIN E-400 PO Take  by mouth. cholecalciferol (VITAMIN D3) 1,000 unit tablet Take  by mouth daily. cyanocobalamin (VITAMIN B-12) 1,000 mcg tablet Take 1,000 mcg by mouth daily. Follow-up Appointments   Procedures    FOLLOW UP VISIT Appointment in: Two Weeks     Standing Status:   Standing     Number of Occurrences:   1     Order Specific Question:   Appointment in     Answer:    Two Weeks       Signed By: Radha Higginbotham NP     August 31, 2017

## 2017-09-02 NOTE — PROGRESS NOTES
9-2-17 @ 10am    Pt did not return CM's call. CM called pt back. Pt states she has decided to not start home health at this time. CM gave pt name and number and let pt know that CM will be here today and tomorrow from 8:30am to 5pm if she changes her mind and would like to set up home health.

## 2017-09-05 ENCOUNTER — DOCUMENTATION ONLY (OUTPATIENT)
Dept: ORTHOPEDIC SURGERY | Age: 57
End: 2017-09-05

## 2017-09-05 NOTE — PROGRESS NOTES
fmla form was received from dpt of labor , instructed patient that we will call when completed, may take 7-10 business days.

## 2017-09-14 ENCOUNTER — OFFICE VISIT (OUTPATIENT)
Dept: ORTHOPEDIC SURGERY | Age: 57
End: 2017-09-14

## 2017-09-14 VITALS
BODY MASS INDEX: 34.79 KG/M2 | RESPIRATION RATE: 18 BRPM | DIASTOLIC BLOOD PRESSURE: 80 MMHG | TEMPERATURE: 98.4 F | SYSTOLIC BLOOD PRESSURE: 127 MMHG | HEIGHT: 64 IN | WEIGHT: 203.8 LBS | OXYGEN SATURATION: 99 % | HEART RATE: 68 BPM

## 2017-09-14 DIAGNOSIS — Z98.1 S/P CERVICAL SPINAL FUSION: ICD-10-CM

## 2017-09-14 DIAGNOSIS — M48.02 CERVICAL STENOSIS OF SPINE: Primary | ICD-10-CM

## 2017-09-14 DIAGNOSIS — R29.898 WEAKNESS OF RIGHT HAND: ICD-10-CM

## 2017-09-14 NOTE — PATIENT INSTRUCTIONS
Neck: Exercises  Your Care Instructions  Here are some examples of typical rehabilitation exercises for your condition. Start each exercise slowly. Ease off the exercise if you start to have pain. Your doctor or physical therapist will tell you when you can start these exercises and which ones will work best for you. How to do the exercises  Note: Stretching should make you feel a gentle stretch, but no pain. Stop any strengthening exercise that makes pain worse. Neck stretch    1. This stretch works best if you keep your shoulder down as you lean away from it. To help you remember to do this, start by relaxing your shoulders and lightly holding on to your thighs or your chair. 2. Tilt your head toward your shoulder and hold for 15 to 30 seconds. Let the weight of your head stretch your muscles. 3. If you would like a little added stretch, use your hand to gently and steadily pull your head toward your shoulder. For example, keeping your right shoulder down, lean your head to the left. 4. Repeat 2 to 4 times toward each shoulder. Diagonal neck stretch    1. Turn your head slightly toward the direction you will be stretching, and tilt your head diagonally toward your chest and hold for 15 to 30 seconds. 2. If you would like a little added stretch, use your hand to gently and steadily pull your head forward on the diagonal.  3. Repeat 2 to 4 times toward each side. Dorsal glide stretch    1. Sit or stand tall and look straight ahead. 2. Slowly tuck your chin as you glide your head backward over your body  3. Hold for a count of 6, and then relax for up to 10 seconds. 4. Repeat 8 to 12 times. Note: The dorsal glide stretches the back of the neck. If you feel pain, do not glide so far back. Some people find this exercise easier to do while lying on their backs with an ice pack on the neck. Chest and shoulder stretch    1.  Sit or stand tall and glide your head backward as in the dorsal glide stretch. 2. Raise both arms so that your hands are next to your ears. 3. Take a deep breath, and as you breathe out, lower your elbows down and behind your back. You will feel your shoulder blades slide down and together, and at the same time you will feel a stretch across your chest and the front of your shoulders. 4. Hold for about 6 seconds, and then relax for up to 10 seconds. 5. Repeat 8 to 12 times. Strengthening: Hands on head    1. Move your head backward, forward, and side to side against gentle pressure from your hands, holding each position for about 6 seconds. 2. Repeat 8 to 12 times. Follow-up care is a key part of your treatment and safety. Be sure to make and go to all appointments, and call your doctor if you are having problems. It's also a good idea to know your test results and keep a list of the medicines you take. Where can you learn more? Go to http://denzel-tor.info/. Enter P975 in the search box to learn more about \"Neck: Exercises. \"  Current as of: March 21, 2017  Content Version: 11.3  © 6924-3635 [employer]. Care instructions adapted under license by [employer] (which disclaims liability or warranty for this information). If you have questions about a medical condition or this instruction, always ask your healthcare professional. [address] any warranty or liability for your use of this information. Hand Arthritis: Exercises  Your Care Instructions  Here are some examples of exercises for hand arthritis. Start each exercise slowly. Ease off the exercise if you start to have pain. Your doctor or your physical or occupational therapist will tell you when you can start these exercises and which ones will work best for you. How to do the exercises  Tendon glides    In this exercise, the steps follow one another to a make a continuous movement.   5. With your affected hand, point your fingers and thumb straight up. Your wrist should be relaxed, following the line of your fingers and thumb. 6. Curl your fingers so that the top two joints in them are bent, and your fingers wrap down. Your fingertips should touch or be near the base of your fingers. Your fingers will look like a hook. 7. Make a fist by bending your knuckles. Your thumb can gently rest against your index (pointing) finger. 8. Unwind your fingers slightly so that your fingertips can touch the base of your palm. Your thumb can rest against your index finger. 9. Move back to your starting position, with your fingers and thumb pointing up. 10. Repeat the series of motions 8 to 12 times. 11. Switch hands and repeat steps 1 through 6, even if only one hand is sore. Intrinsic flexion    4. Rest your affected hand on a table and bend the large joints where your fingers connect to your hand. Keep your thumb and the other joints in your fingers straight. 5. Slowly straighten your fingers. Your wrist should be relaxed, following the line of your fingers and thumb. 6. Move back to your starting position, with your hand bent. 7. Repeat 8 to 12 times. 8. Switch hands and repeat steps 1 through 4, even if only one hand is sore. Finger extension    5. Place your affected hand flat on a table. 6. Lift and then lower one finger at a time off the table. 7. Repeat 8 to 12 times. 8. Switch hands and repeat steps 1 through 3, even if only one hand is sore. MP extension    6. Place your good hand on a table, palm up. Put your affected hand on top of your good hand with your fingers wrapped around the thumb of your good hand like you are making a fist.  7. Slowly uncurl the joints of your affected hand where your fingers connect to your hand so that only the top two joints of your fingers are bent. Your fingers will look like a hook. 8. Move back to your starting position, with your fingers wrapped around your good thumb.   9. Repeat 8 to 12 times. 10. Switch hands and repeat steps 1 through 4, even if only one hand is sore. PIP extension (with MP extension)    3. Place your good hand on a table, palm up. Put your affected hand on top of your good hand, palm up. 4. Use the thumb and fingers of your good hand to grasp below the middle joint of one finger of your affected hand. 5. Straighten the last two joints of that finger. 6. Repeat 8 to 12 times. 7. Repeat steps 1 through 4 with each finger. 8. Switch hands and repeat steps 1 through 5, even if only one hand is sore. DIP flexion    1. With your good hand, grasp one finger of your affected hand. Your thumb will be on the top side of your finger just below the joint that is closest to your fingernail. 2. Slowly bend your affected finger only at the joint closest to your fingernail. 3. Repeat 8 to 12 times. 4. Repeat steps 1 through 3 with each finger. 5. Switch hands and repeat steps 1 through 4, even if only one hand is sore. Follow-up care is a key part of your treatment and safety. Be sure to make and go to all appointments, and call your doctor if you are having problems. It's also a good idea to know your test results and keep a list of the medicines you take. Where can you learn more? Go to http://denzel-tor.info/. Enter O373 in the search box to learn more about \"Hand Arthritis: Exercises. \"  Current as of: March 21, 2017  Content Version: 11.3  © 0594-3568 [employer]. Care instructions adapted under license by [employer] (which disclaims liability or warranty for this information). If you have questions about a medical condition or this instruction, always ask your healthcare professional. [address] any warranty or liability for your use of this information.

## 2017-09-14 NOTE — PROGRESS NOTES
[address]  [address], [address]  [place], [address] [place] [address] Street  Phone: [phone]  Fax: [phone]  PROGRESS NOTE  Patient: Emeterio Vasquez                MRN: [id number]       SSN: [id number]  YOB: 1960        AGE: 62 y.o. SEX: female  Body mass index is 34.98 kg/(m^2). PCP: Fabián Tatum MD  09/14/17    Chief Complaint   Patient presents with    Neck Pain     post op       HISTORY OF PRESENT ILLNESS:  Emeterio Vasquez is a 62 y.o. female with history of neck pain, numbness of the arm(s) and loss of strength of the arm(s) who had ACDF C7-T1 surgery two weeks ago. Pain prior to surgery was in the neck and RUE in the C7/8 distribution from neck to hand. .  She also had right hand weakness and reported balance issues prior to her surgery. Pain is aching, numbing and tingling. Pain is worse with looking up, looking down, pushing, pulling and affects work and recreational activities. Pain is better with relaxation. neck pain, numbness of the arm(s) and loss of strength of the arm(s) has improved since surgery. Her pain is now intermittent hand numbness and neck pain. Her strength is improving. She can now turn a light switch, she could not do this before. States she has been using nothing for pain. Patient denies any fevers, wound drainage, bladder/bowel dysfunction, new onset weakness, saddle paresthesia, new onset radiculopathy or nerve pain, or other neurological deficits. Reports that she is swallowing without difficulty. Pt desires to continue with evaluation of neck and RUE pain and postoperative care. ASSESSMENT   Diagnoses and all orders for this visit:    1. Cervical stenosis of spine  -     REFERRAL TO PHYSICAL THERAPY    2.  S/P cervical spinal fusion  -     REFERRAL TO PHYSICAL THERAPY    3. Weakness of right hand  -     REFERRAL TO PHYSICAL THERAPY  -     REFERRAL TO OCCUPATIONAL THERAPY       IMPRESSION AND PLAN:  This is a pt doing well after an ACDF C7-T1 surgery two weeks ago. Her RUE hand weakness and numbness is improving. Her incision is healing well. We are going to get her started in some OT/PT for that right hand weakness. She will remain OOW until her next f/o.     1) Pt was given informatidon on neck and hand exercises an post-operative and wound care. 2) Reviewed activity and to avoid NSAIDs x 3 months. 3) PT/OT  4) Ms. Mary Patel has a reminder for a \"due or due soon\" health maintenance. I have asked that she contact her primary care provider, Vy Quijano MD, for follow-up on this health maintenance. 5) We have informed the patient to notify us for immediate appointment if he has any worsening neurogical symptoms or if an emergency situation presents, then call 911  6)  demonstrated consistency with prescribing. 7) Pt will follow-up in 4 weeks. SUBJECTIVE    Smoking Status Former smoker  Work [employer]    Pain Scale: 0 - No pain/10    Pain Assessment  9/14/2017   Location of Pain Neck   Location Modifiers -   Severity of Pain 0   Quality of Pain -   Quality of Pain Comment -   Frequency of Pain -   Aggravating Factors (No Data)   Aggravating Factors Comment l;manfred in the wrong position for to long   Limiting Behavior -   Relieving Factors (No Data)   Relieving Factors Comment Cervical Collar   Result of Injury -           REVIEW OF SYSTEMS  Constitutional: Negative for fever, chills, or weight change. Respiratory: Negative for cough or shortness of breath. Cardiovascular: Negative for chest pain or palpitations. Gastrointestinal: Negative for incontinence, acid reflux, change in bowel habits, or constipation. Genitourinary: Negative for incontinence, dysuria and flank pain. Musculoskeletal: Positive for neck and RUE pain. See HPI. Skin: Negative for rash. Neurological:Right hand numbness. See HPI. Endo/Heme/Allergies: Negative. Psychiatric/Behavioral: Negative.       PHYSICAL EXAMINATION  Visit Vitals    /80    Pulse 68    Temp 98.4 °F (36.9 °C) (Oral)    Resp 18    Ht 5' 4\" (1.626 m)    Wt 203 lb 12.8 oz (92.4 kg)    SpO2 99%    BMI 34.98 kg/m2         Accompanied by self. Constitutional:  Well developed, well nourished, in no acute distress. Psychiatric: Affect and mood are appropriate. Integumentary: No rashes or abrasions noted on exposed areas. Wound: Anterior neck Incision healing well, no drainage, no erythema, no hernia, no seroma, no swelling, no dehiscence, incision well approximated. Cardiovascular/Peripheral Vascular: +2 radial & pedal pulses. No peripheral edema is noted. Lymphatic:  No evidence of lymphedema. No cervical lymphadenopathy. SPINE/MUSCULOSKELETAL EXAM    Cervical spine:    Neck is midline. Normal muscle tone. No focal atrophy is noted. Shoulder ROM intact. Neck ROM decreased with flexion, extension, turning right, turning left. Tenderness to palpation bilateral trapezius. Negative Minor's sign. Sensation grossly intact to light touch. MOTOR:        Biceps  Triceps Deltoids Wrist Ext Wrist Flex Hand Intrin   Right +4/5 +4/5 +4/5 +3/5 +3/5 +3/5   Left +4/5 +4/5 +4/5 +4/5 +4/5 +4/5       normal gait and station    Ambulation without assistive device. FWB. IMAGING: [facility] at [facility]   Past Medical History:   Diagnosis Date    Diabetes ([facility] [address]     High cholesterol     Hypertension        Past Surgical History:   Procedure Laterality Date    HX BREAST BIOPSY      HX CHOLECYSTECTOMY      HX HERNIA REPAIR     . MEDICATIONS      Current Outpatient Prescriptions   Medication Sig Dispense Refill    promethazine (PHENERGAN) 25 mg tablet Take 25 mg by mouth every six (6) hours as needed for Nausea.  simvastatin (ZOCOR) 10 mg tablet TAKE 1 TABLET BY MOUTH ONCE A DAY WITH DINNER      esomeprazole (NEXIUM) 40 mg capsule Take 40 mg by mouth.  gabapentin (NEURONTIN) 100 mg capsule Take 100 mg by mouth.       hydroCHLOROthiazide (HYDRODIURIL) 25 mg tablet Take 25 mg by mouth.  metFORMIN (GLUCOPHAGE) 1,000 mg tablet Take 1,000 mg by mouth.  glimepiride (AMARYL) 4 mg tablet Take 4 mg by mouth.  VITAMIN E-400 PO Take  by mouth.  cholecalciferol (VITAMIN D3) 1,000 unit tablet Take  by mouth daily.  cyanocobalamin (VITAMIN B-12) 1,000 mcg tablet Take 1,000 mcg by mouth daily.  oxyCODONE-acetaminophen (PERCOCET)  mg per tablet Take 1 Tab by mouth every six (6) hours as needed for Pain. Max Daily Amount: 4 Tabs. Indications: acute post op pain 30 Tab 0        ALLERGIES    Allergies   Allergen Reactions    Ibuprofen Shortness of Breath    Seafood Nausea Only and Swelling          SOCIAL HISTORY    Social History     Social History    Marital status: UNKNOWN     Spouse name: N/A    Number of children: N/A    Years of education: N/A     Occupational History    Not on file.      Social History Main Topics    Smoking status: Former Smoker    Smokeless tobacco: Never Used      Comment: quit 2008 (approx)    Alcohol use No    Drug use: No    Sexual activity: Not on file     Other Topics Concern    Not on file     Social History Narrative       FAMILY HISTORY    Family History   Problem Relation Age of Onset    Diabetes Mother     Diabetes Sister     Hypertension Sister     Diabetes Other     Hypertension Other          Rebeka June NP

## 2017-09-14 NOTE — MR AVS SNAPSHOT
Visit Information Date & Time Provider Department Dept. Phone Encounter #  
 9/14/2017  2:00 PM Laksiha Alvarez NP [facility] [phone] Follow-up Instructions Return in about 4 weeks (around 10/12/2017). Your Appointments 9/14/2017  2:00 PM  
POST OP with Lakisha Alvarez NP  
[facility] Appt Note: ACDF C7-T1; ACDF C7-T1  
 [address] [facility] [address] [phone]  
  
   
 [address] [facility],[address] Jose[address]  
  
    
 10/6/2017 11:00 AM  
POST OP with Apollo Sepulveda MD  
[address] [place] [address], [address] [facility]) Appt Note: 6 WK FU SX 8-23  
 [address] [facility] [id number] [phone]  
  
   
 [address] [facility] [address] Upcoming Health Maintenance Date Due Hepatitis C Screening 1960 DTaP/Tdap/Td series (1 - Tdap) 4/8/1981 PAP AKA CERVICAL CYTOLOGY 4/8/1981 BREAST CANCER SCRN MAMMOGRAM 4/8/2010 FOBT Q 1 YEAR AGE 50-75 4/8/2010 INFLUENZA AGE 9 TO ADULT 8/1/2017 Allergies as of 9/14/2017  Review Complete On: 8/30/2017 By: Jean Marie Roman RN Severity Noted Reaction Type Reactions Ibuprofen  08/04/2017    Shortness of Breath Seafood  08/30/2017    Nausea Only, Swelling Current Immunizations  Never Reviewed No immunizations on file. Not reviewed this visit You Were Diagnosed With   
  
 Codes Comments Cervical stenosis of spine    -  Primary ICD-10-CM: M48.02 
ICD-9-CM: 723.0 S/P cervical spinal fusion     ICD-10-CM: Z98.1 ICD-9-CM: V45.4 Weakness of right hand     ICD-10-CM: R29.898 ICD-9-CM: 728.87 Vitals BP Pulse Temp Resp Height(growth percentile) Weight(growth percentile) 127/80 68 98.4 °F (36.9 °C) (Oral) 18 5' 4\" (1.626 m) 203 lb 12.8 oz (92.4 kg) SpO2 BMI OB Status Smoking Status 99% 34.98 kg/m2 Postmenopausal Former Smoker BMI and BSA Data Body Mass Index Body Surface Area 34.98 kg/m 2 2.04 m 2 Preferred Pharmacy Pharmacy Name Phone [address], [place] [phone] Your Updated Medication List  
  
   
This list is accurate as of: 9/14/17  1:28 PM.  Always use your most recent med list.  
  
  
  
  
 esomeprazole 40 mg capsule Commonly known as:  Alphonza Fare Take 40 mg by mouth.  
  
 gabapentin 100 mg capsule Commonly known as:  NEURONTIN Take 100 mg by mouth.  
  
 glimepiride 4 mg tablet Commonly known as:  AMARYL Take 4 mg by mouth. hydroCHLOROthiazide 25 mg tablet Commonly known as:  HYDRODIURIL Take 25 mg by mouth.  
  
 metFORMIN 1,000 mg tablet Commonly known as:  GLUCOPHAGE Take 1,000 mg by mouth. oxyCODONE-acetaminophen  mg per tablet Commonly known as:  PERCOCET Take 1 Tab by mouth every six (6) hours as needed for Pain. Max Daily Amount: 4 Tabs. Indications: acute post op pain  
  
 promethazine 25 mg tablet Commonly known as:  PHENERGAN Take 25 mg by mouth every six (6) hours as needed for Nausea. simvastatin 10 mg tablet Commonly known as:  ZOCOR  
TAKE 1 TABLET BY MOUTH ONCE A DAY WITH DINNER  
  
 VITAMIN B-12 1,000 mcg tablet Generic drug:  cyanocobalamin Take 1,000 mcg by mouth daily. VITAMIN D3 1,000 unit tablet Generic drug:  cholecalciferol Take  by mouth daily. VITAMIN E-400 PO Take  by mouth. We Performed the Following REFERRAL TO OCCUPATIONAL THERAPY [REF53 Custom] Comments: For right hand weakness REFERRAL TO PHYSICAL THERAPY [BTI96 Custom] Comments: S/p cervical fusion and neck pain and left hand weakness Follow-up Instructions Return in about 4 weeks (around 10/12/2017). Referral Information Referral ID Referred By Referred To  
  
 [id number] Isiseufemia [facility] Not Available Visits Status Start Date End Date 1 New Request 9/14/17 9/14/18 If your referral has a status of pending review or denied, additional information will be sent to support the outcome of this decision. Referral ID Referred By Referred To  
 [id number] Racquel [facility] Not Available Visits Status Start Date End Date 1 New Request 9/14/17 9/14/18 If your referral has a status of pending review or denied, additional information will be sent to support the outcome of this decision. Patient Instructions Neck: Exercises Your Care Instructions Here are some examples of typical rehabilitation exercises for your condition. Start each exercise slowly. Ease off the exercise if you start to have pain. Your doctor or physical therapist will tell you when you can start these exercises and which ones will work best for you. How to do the exercises Note: Stretching should make you feel a gentle stretch, but no pain. Stop any strengthening exercise that makes pain worse. Neck stretch 1. This stretch works best if you keep your shoulder down as you lean away from it. To help you remember to do this, start by relaxing your shoulders and lightly holding on to your thighs or your chair. 2. Tilt your head toward your shoulder and hold for 15 to 30 seconds. Let the weight of your head stretch your muscles. 3. If you would like a little added stretch, use your hand to gently and steadily pull your head toward your shoulder. For example, keeping your right shoulder down, lean your head to the left. 4. Repeat 2 to 4 times toward each shoulder. Diagonal neck stretch 1. Turn your head slightly toward the direction you will be stretching, and tilt your head diagonally toward your chest and hold for 15 to 30 seconds.  
2. If you would like a little added stretch, use your hand to gently and steadily pull your head forward on the diagonal. 
3. Repeat 2 to 4 times toward each side. Dorsal glide stretch 1. Sit or stand tall and look straight ahead. 2. Slowly tuck your chin as you glide your head backward over your body 3. Hold for a count of 6, and then relax for up to 10 seconds. 4. Repeat 8 to 12 times. Note: The dorsal glide stretches the back of the neck. If you feel pain, do not glide so far back. Some people find this exercise easier to do while lying on their backs with an ice pack on the neck. Chest and shoulder stretch 1. Sit or stand tall and glide your head backward as in the dorsal glide stretch. 2. Raise both arms so that your hands are next to your ears. 3. Take a deep breath, and as you breathe out, lower your elbows down and behind your back. You will feel your shoulder blades slide down and together, and at the same time you will feel a stretch across your chest and the front of your shoulders. 4. Hold for about 6 seconds, and then relax for up to 10 seconds. 5. Repeat 8 to 12 times. Strengthening: Hands on head 1. Move your head backward, forward, and side to side against gentle pressure from your hands, holding each position for about 6 seconds. 2. Repeat 8 to 12 times. Follow-up care is a key part of your treatment and safety. Be sure to make and go to all appointments, and call your doctor if you are having problems. It's also a good idea to know your test results and keep a list of the medicines you take. Where can you learn more? Go to http://denzel-tor.info/. Enter P975 in the search box to learn more about \"Neck: Exercises. \" Current as of: March 21, 2017 Content Version: 11.3 © 5273-4150 [facility]. Care instructions adapted under license by [employer] (which disclaims liability or warranty for this information).  If you have questions about a medical condition or this instruction, always ask your healthcare professional. [place] [address] any warranty or liability for your use of this information. Hand Arthritis: Exercises Your Care Instructions Here are some examples of exercises for hand arthritis. Start each exercise slowly. Ease off the exercise if you start to have pain. Your doctor or your physical or occupational therapist will tell you when you can start these exercises and which ones will work best for you. How to do the exercises Tendon glides In this exercise, the steps follow one another to a make a continuous movement. 5. With your affected hand, point your fingers and thumb straight up. Your wrist should be relaxed, following the line of your fingers and thumb. 6. Curl your fingers so that the top two joints in them are bent, and your fingers wrap down. Your fingertips should touch or be near the base of your fingers. Your fingers will look like a hook. 7. Make a fist by bending your knuckles. Your thumb can gently rest against your index (pointing) finger. 8. Unwind your fingers slightly so that your fingertips can touch the base of your palm. Your thumb can rest against your index finger. 9. Move back to your starting position, with your fingers and thumb pointing up. 10. Repeat the series of motions 8 to 12 times. 11. Switch hands and repeat steps 1 through 6, even if only one hand is sore. Intrinsic flexion 4. Rest your affected hand on a table and bend the large joints where your fingers connect to your hand. Keep your thumb and the other joints in your fingers straight. 5. Slowly straighten your fingers. Your wrist should be relaxed, following the line of your fingers and thumb. 6. Move back to your starting position, with your hand bent. 7. Repeat 8 to 12 times. 8. Switch hands and repeat steps 1 through 4, even if only one hand is sore. Finger extension 5. Place your affected hand flat on a table. 6. Lift and then lower one finger at a time off the table. 7. Repeat 8 to 12 times. 8. Switch hands and repeat steps 1 through 3, even if only one hand is sore. MP extension 6. Place your good hand on a table, palm up. Put your affected hand on top of your good hand with your fingers wrapped around the thumb of your good hand like you are making a fist. 
7. Slowly uncurl the joints of your affected hand where your fingers connect to your hand so that only the top two joints of your fingers are bent. Your fingers will look like a hook. 8. Move back to your starting position, with your fingers wrapped around your good thumb. 9. Repeat 8 to 12 times. 10. Switch hands and repeat steps 1 through 4, even if only one hand is sore. PIP extension (with MP extension) 3. Place your good hand on a table, palm up. Put your affected hand on top of your good hand, palm up. 4. Use the thumb and fingers of your good hand to grasp below the middle joint of one finger of your affected hand. 5. Straighten the last two joints of that finger. 6. Repeat 8 to 12 times. 7. Repeat steps 1 through 4 with each finger. 8. Switch hands and repeat steps 1 through 5, even if only one hand is sore. DIP flexion 1. With your good hand, grasp one finger of your affected hand. Your thumb will be on the top side of your finger just below the joint that is closest to your fingernail. 2. Slowly bend your affected finger only at the joint closest to your fingernail. 3. Repeat 8 to 12 times. 4. Repeat steps 1 through 3 with each finger. 5. Switch hands and repeat steps 1 through 4, even if only one hand is sore. Follow-up care is a key part of your treatment and safety. Be sure to make and go to all appointments, and call your doctor if you are having problems. It's also a good idea to know your test results and keep a list of the medicines you take. Where can you learn more? Go to http://denzel-tor.info/. Enter S427 in the search box to learn more about \"Hand Arthritis: Exercises. \" Current as of: March 21, 2017 Content Version: 11.3 © 3848-3197 [employer]. Care instructions adapted under license by [employer] (which disclaims liability or warranty for this information). If you have questions about a medical condition or this instruction, always ask your healthcare professional. [address] any warranty or liability for your use of this information. Please provide this summary of care documentation to your next provider. Your primary care clinician is listed as Sunni Hayden. If you have any questions after today's visit, please call [phone].

## 2017-09-19 ENCOUNTER — TELEPHONE (OUTPATIENT)
Dept: ORTHOPEDIC SURGERY | Age: 57
End: 2017-09-19

## 2017-09-19 NOTE — TELEPHONE ENCOUNTER
Post op pt (sx 8/23) last seen 9/14/17: p/o f/u 10/6/17:    Pt reports no one has contacted her for [facility] health--   order was initiated 9/14/17.      Please call pt to confirm  Pt p#[phone]

## 2017-09-19 NOTE — TELEPHONE ENCOUNTER
Left message for patient to return my call, need to know what place is close to her for her to attend physical therapy and occupational therapy? I need to call them to get her set up for this therapy since no one has called her with the orders that were given on her last visit.

## 2017-10-02 ENCOUNTER — HOSPITAL ENCOUNTER (OUTPATIENT)
Dept: PHYSICAL THERAPY | Age: 57
Discharge: HOME | End: 2017-10-02
Payer: COMMERCIAL

## 2017-10-02 PROCEDURE — 97110 THERAPEUTIC EXERCISES: CPT

## 2017-10-02 PROCEDURE — 97166 OT EVAL MOD COMPLEX 45 MIN: CPT

## 2017-10-02 NOTE — PROGRESS NOTES
[facility]  [address]  [place], [address]  [phone] fax    Plan of Care/Statement of Necessity for Occupational Therapy Services    Patient name: Lani Randall Start of Care: 10/2/2017   Referral source: Toni Penaloza NP : 1960    Medical Diagnosis: Right hand weakness [R29.898]   Onset Date:2017, surgery 17   Treatment Diagnosis: right UE weakness   Prior Hospitalization: see medical history Provider#: [id number]   Medications: Verified on Patient summary List    Comorbidities: HTN, cervical stenosis, diabetes,    Prior Level of Function: Independent without limitations in ADL and IADL activities. The Plan of Care and following information is based on the information from the initial evaluation. Assessment/ key information:  Patient is a 62 y.o. female with a chief complaint  of weakness in the right hand since May of 2017, she was having signs and symptoms consistent with cervical stenosis which consisted of neck pain that started several months ago and it  progressed into the right and left UE and patient reported right hand numbness and weakness, loss of intrinsic strength, dexterity. She presents today with signs and symptoms of upper nerve compression. Poor strength in the intrinsics of the hand in distributions from the median and ulnar nerves. Poor opposition and difficulty with holding objects. Patient received an initial evaluation today followed by education as to diagnosis, precautions and treatment plan. Patient was provided with a basic home exercise program including median nerve glides, brachial plexus glides, and tendon glides for strengthening and improving hand coordination.      Evaluation Complexity: History MEDIUM Complexity : Expanded review of history including physical, cognitive and psychosocial  history  Examination MEDIUM Complexity : 3-5 performance deficits relating to physical, cognitive , or psychosocial skils that result in activity limitations and / or participation restrictions Clinical Decision Making MEDIUM Complexity : Patient may present with comorbidities that affect occupational performnce. Miniml to moderate modification of tasks or assistance (eg, physical or verbal ) with assesment(s) is necessary to enable patient to complete evaluation   Overall Complexity Rating: MEDIUM    Patient would benefit from OT/Hand therapy services for the following problems:  Problem List: Decreased strength and Decreased coordination/prehension     Treatment Plan may include any combination of the following: Therapeutic exercise, Neuromuscular re-education, Physical agent/modality, Manual therapy, Splinting/orthoses and Patient education    Patient / Family readiness to learn indicated by: asking questions and trying to perform skills    Persons(s) to be included in education:   patient (P)    Barriers to Learning/Limitations: yes;  cognitive    Patient Goal (s): use of the right hand    Patient Self Reported Health Status: good    Rehabilitation Potential: good    Short Term Goals: To be accomplished in 2  weeks:  Goal:* Patient will be compliant with initial home exercise program to take an active role in their rehabilitation process. Status at [facility]:   Patient was provided with a basic home exercise program including median nerve glides, brachial plexus glides, and tendon glides for strengthening and improving hand coordination. Goal:* Patient will demonstrate a good understanding of their condition and strategies for self-management. Status at [facility]:  Patient received an initial evaluation today followed by education as to diagnosis, precautions and treatment plan. Long Term Goals: To be accomplished in 4 weeks:   Goal:* Pt will have 40 pounds of  in the right hand to allow for functional grasp for all ADL activities including dressing, bathing and self care.   At [facility]: 25    Goal:* Patient will have improved right lateral pinch strength of  6 pounds in order to perform fine motor tasks such as turning pages, turning ignition and open containers. .  At Eval: 3    Goal:* Patient will have improved right tip pinch strength of 4 pounds in order to perform fine motor tasks such as zippiing up zippers or opening containers. At Eval: 2    Goal:* Patient will have improved right dorothy pinch strength of 3 pounds in order to perform fine motor tasks such as writing. At Eval: 1    Goal:* Patient will show a 10 point improvement on FOTO functional status measure to improve overall functional performance. Status at Eval: 46    Frequency / Duration: Patient to be seen 2-3 times per week for 4 weeks:    Patient/ Caregiver education and instruction: Diagnosis, prognosis, exercises    Functional Status Measure:  Patient's:51  FOTO Benchmark: 45  Expected Change: 11  FOTO score based on 0 - 100 point scale, with 100 being no impairment. These scores are determined by patient reported functional assessments compared against national benchmarked data.     Kaylee Johnson OT, CHT, CLT 10/2/2017 1:03 PM    ________________________________________________________________________    I certify that the above Therapy Services are being furnished while the patient is under my care. I agree with the treatment plan and certify that this therapy is necessary.     Physician's Signature:____________________  Date:____________Time:__________    Please sign and return to [facility] [address] [place], [address]  [phone] fax

## 2017-10-02 NOTE — PROGRESS NOTES
Hand Therapy Evaluation and Daily Note    Patient Name: Suleman Rousseau  Date:10/2/2017  : 1960  Age: 62 y.o.y/o  [x]  Patient  Verified  Payor: Claus Moss / Plan: [employer] HMO / Product Type: HMO /    Referring Provider: MARKEL Jon MD Visit:  10/6/17  Onset Date:  May 2017  Surgical Date: 17  Surgical Procedure: PROCEDURES PERFORMED  1. Anterior cervical decompression and fusion C7-T1. 2. Zero-P interbody device and cervical plate fixation. 3. Zero-P plate and screws. 4. Demineralized bone matrix. In time:1250  Out time:1340  Total Treatment Time (min): 50  Visit #: 1  8    Treatment Area: Other symptoms and signs involving the musculoskeletal system [R29.898]    Precautions: NA    Hand Dominance: right handed   Hand Involved: right    Total Evaluation Time:  30    History of Present Condition:  Patient is a 62 y.o. female with a chief complaint  of weakness in the right hand since May of 2017, she was having signs and symptoms consistent with cervical stenosis which consisted of  neck pain that started several months ago progressing with first right and left-sided hand numbness and weakness, loss of intrinsic strength, dexterity. Pain Rating:   Current: (0-no pain 10-debilitating pain) 0 / 10   At best: (0-no pain 10-debilitating pain) 0 / 10  At worst: (0-no pain 10-debilitating pain) 0 / 10  Location: right arm  Type:  no   Better with: Worse with:     Medications/Allergies/Past Medical History:  See chart; reviewed with patient. Diabetes, HTN    Diagnostic Tests: EMG confirmed the radiculopathy    Prior Level of Function: Independent without limitations in ADL and IADL activities. Current Level of Function:  UE weakness    Social History: , lives with     Occupation/Job Requirements:  provider    Observation: surgical intervention noted on the anterior aspect of the right neck.     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 but PROM is normal      Strength:   Measurements: Taken with Mandeep Dynamometer, in Lbs   Level 2 10/2/17 Date Date Date Date Date Date   Right 25         Left 53         Deficit 51%         Change                Pinch Measurements: Taken with Pinch Gauge, in Lbs   (hand) 10/2/17 Date Date Date Date Date   Lateral          Right 3        Left  11        Deficit         Change         Pad         Right 2        Left 6        Deficit         Change         Rickey         Right 1        Left 8        Deficit         Change           Sensation:   Improved since surgery, only occasion warm sensation    Upper Extremity Manual Muscle Test  Nerve  Nerve root  Brachial Plexus Origin Muscle 10/2/17 Date Date Date   C 1-5         Upper Trapezius        Middle Trapezius        Lower Trapezius       Dorsal Scapular  C 4,5  Pre-trunk         Rhomboids       Suprascapular  C4,5,6  Superior Trunk         Supraspinatus        Infraspinatus       Long Thoracic  C5,6,7,(8)  Pre-trunk         Serratus Anterior       Lower Subscapular  C(4),5,6,(7)  Posterior Cord         Teres Major       Lateral Pectoral  C5,6,7  Lateral Cord         Pectoralis Major-Clavicular Portion        Pectoralis Major-Sternal Portion       Thoracodorsal  C(5),6,7,8  Posterior Cord         Latissimus Dorsi       Musculocutaneous  C(4),5,6,7  Lateral Cord         Biceps and Brachialis        Coracobrachialis       Axillary  C5,6  Posterior Cord         Anterior Deltoid        Middle Deltoid        Posterior Deltoid        Teres Minor       Anterior Interosseous of     the Median Nerve  C(5),6,7,8,T1  Lateral and Medial     Cords         Pronatus Quadratus 5       Flexor Digitorum Profundus 1,2 3       Flexor Pollicis Longus 3      Median Nerve  C(5),6,7,8,T1  Lateral and Medial Cords         Pronator Teres Flexor Carpi Radialis 4       Flexor Digitorum Superficialis 3       Palmaris Longus        Flexor Pollicis Brevis (Superficial Head)        Abductor Pollicis Brevis 3       Opponens Pollicis 3       Lumbricales 1,2 3      Radial Nerve  C5,6,7,8,(T1)  Posterior Cord         Triceps 5       Supinator 5       Brachioradialis 5       Extensor Carpi Radialis 5       Extensor Carpi Ulnaris 5       Extensor Digitorum Communis 4       Extensor Digit Quinti 4       Extensor Digiti Proprius 3       Extensor Pollicis Longus 3       Extensor Pollicis Brevis 3       Abductor Pollicis Longus 4      Ulnar Nerve  C(7),8,T1  Medial Cord         Flexor Carpi Ulnaris 4       Flexor Digitorum Profundus 3,4 3       Abductor Digiti Quinti 2       Adductor Pollicis 4       Opponens Digiti Quinti 3       Dorsal Interosseous 1 3       Dorsal Interosseous 2 3       Dorsal Interosseous 3 3       Dorsal Interosseous 4 3       Volar Interosseous 1 3       Volar Interosseous 2 3       Volar Interosseous 3 3       Lumbricales 3,4 3       Flexor Pollicis Brevis (deep head) 3        Key  5 Normal Full range against gravity & maximum resistance  4 Good  Full range against gravity &  resistance  3 Fair  Full range against gravity  2 Poor  Full range gravity eliminated  1 Trace  Perceptible contraction, no movement of part  0 Zero  No contraction      Edema: NA    Special Tests:    Provocative test: NA    ADLs  Feeding:        []MaxA   []ModA   []Stanley   [] CGA   [x]SBA   []Earnestine   []Independent  UE Dressing:       []MaxA   []ModA   []Stanley   [] CGA   [x]SBA   []Earnestine   []Independent  LE Dressing:       []MaxA   []ModA   []Stanley   [] CGA   [x]SBA   []Earnestine   []Independent  Grooming:       []MaxA   []ModA   []Stanley   [] CGA   [x]SBA   []Earnestine   []Independent  Toileting:       []MaxA   []ModA   []Stanley   [] CGA   [x]SBA   []Earnestine   []Independent  Bathing:       []MaxA   []ModA   []Stanley   [] CGA   []SBA   [x]Earnestine   []Independent  Light Meal Prep:    []MaxA []ModA   [x]Stanley   [] CGA   []SBA   []Earnestine   []Independent  Household/Other: []MaxA   []ModA   []Stanley   [] CGA   []SBA   []Earnestine   []Independent  Adaptive Equip:     []MaxA   []ModA   []Stanley   [] CGA   []SBA   []Earnestine   []Independent  Driving:       [x]MaxA   []ModA   []Stanley   [] CGA   []SBA   []Earnestine   []Independent      Todays Treatment:  Patient received an initial evaluation today followed by education as to diagnosis, precautions and treatment plan. Patient was provided with a basic home exercise program including median nerve glides, brachial plexus glides, and tendon glides for strengthening and improving hand coordination. .      OBJECTIVE    10 min Therapeutic Exercise:  [x] See flow sheet :   Rationale: increase ROM, increase strength, improve coordination and increase proprioception to improve the patients ability to use the right hand    With   [] TE   [] TA   [] neuro   [] other: Patient Education: [x] Review HEP    [] Progressed/Changed HEP based on:   [] positioning   [] body mechanics   [] transfers   [] heat/ice application   [] Splint wear/care   [] Sensory re-education   [] scar management      [] other:      Pain Level (0-10 scale) post treatment:0/10    Patient will continue to benefit from skilled OT services to address ROM deficits, address strength deficits and analyze and cue movement patterns to attain goals. Assessment: Patient is a 62 y.o. female with a chief complaint  of weakness in the right hand since May of 2017, she was having signs and symptoms consistent with cervical stenosis which consisted of neck pain that started several months ago and it  progressed into the right and left UE and patient reported right hand numbness and weakness, loss of intrinsic strength, dexterity. She presents today with signs and symptoms of upper nerve compression. Poor strength in the intrinsics of the hand in distributions from the median and ulnar nerves.   Poor opposition and difficulty with holding objects. Patient received an initial evaluation today followed by education as to diagnosis, precautions and treatment plan. Patient was provided with a basic home exercise program including median nerve glides, brachial plexus glides, and tendon glides for strengthening and improving hand coordination. Evaluation Complexity: History MEDIUM Complexity : Expanded review of history including physical, cognitive and psychosocial  history  Examination MEDIUM Complexity : 3-5 performance deficits relating to physical, cognitive , or psychosocial skils that result in activity limitations and / or participation restrictions Clinical Decision Making MEDIUM Complexity : Patient may present with comorbidities that affect occupational performnce. Miniml to moderate modification of tasks or assistance (eg, physical or verbal ) with assesment(s) is necessary to enable patient to complete evaluation   Overall Complexity Rating: MEDIUM    Patient would benefit from OT/Hand therapy services for the following problems:  Problem List: Decreased strength and Decreased coordination/prehension     Treatment Plan may include any combination of the following: Therapeutic exercise, Neuromuscular re-education, Physical agent/modality, Manual therapy, Splinting/orthoses and Patient education    Patient / Family readiness to learn indicated by: asking questions and trying to perform skills    Persons(s) to be included in education:   patient (P)    Barriers to Learning/Limitations: yes;  cognitive    Patient Goal (s): use of the right hand    Patient Self Reported Health Status: good    Rehabilitation Potential: good    Short Term Goals: To be accomplished in 2  weeks:  Goal:* Patient will be compliant with initial home exercise program to take an active role in their rehabilitation process.   Status at Eval:   Patient was provided with a basic home exercise program including median nerve glides, brachial plexus glides, and tendon glides for strengthening and improving hand coordination. Goal:* Patient will demonstrate a good understanding of their condition and strategies for self-management. Status at Eval:  Patient received an initial evaluation today followed by education as to diagnosis, precautions and treatment plan. Long Term Goals: To be accomplished in 4 weeks:   Goal:* Pt will have 40 pounds of  in the right hand to allow for functional grasp for all ADL activities including dressing, bathing and self care. At Eval: 25    Goal:* Patient will have improved right lateral pinch strength of  6 pounds in order to perform fine motor tasks such as turning pages, turning ignition and open containers. .  At Eval: 3    Goal:* Patient will have improved right tip pinch strength of 4 pounds in order to perform fine motor tasks such as zippiing up zippers or opening containers. At Eval: 2    Goal:* Patient will have improved right dorothy pinch strength of 3 pounds in order to perform fine motor tasks such as writing. At Eval: 1    Goal:* Patient will show a 10 point improvement on FOTO functional status measure to improve overall functional performance. Status at Eval: 46    Frequency / Duration: Patient to be seen 2-3 times per week for 4 weeks:    Patient/ Caregiver education and instruction: Diagnosis, prognosis, exercises    Functional Status Measure:  Patient's:51  FOTO Benchmark: 45  Expected Change: 11  FOTO score based on 0 - 100 point scale, with 100 being no impairment.  These scores are determined by patient reported functional assessments compared against national benchmarked data.     Ovidio Pond OT, CHT, CLT 10/2/2017 1:03 PM

## 2017-10-05 ENCOUNTER — HOSPITAL ENCOUNTER (OUTPATIENT)
Dept: PHYSICAL THERAPY | Age: 57
Discharge: HOME | End: 2017-10-05
Payer: COMMERCIAL

## 2017-10-05 PROCEDURE — 97110 THERAPEUTIC EXERCISES: CPT

## 2017-10-05 NOTE — PROGRESS NOTES
OT DAILY TREATMENT NOTE  3-16    Patient Name: Khushboo Zavala  Date:10/5/2017  : 1960  [x]  Patient  Verified  Payor: Rishabh Purdy / Plan: [employer] HMO / Product Type: HMO /    In time:1445  Out time:1525  Total Treatment Time (min):40  Visit #: 2 of 18    Treatment Area: Right hand weakness [R29.898]    SUBJECTIVE  Pain Level (0-10 scale): 0/10  Any medication changes, allergies to medications, adverse drug reactions, diagnosis change, or new procedure performed?: [x] No    [] Yes (see summary sheet for update)  Subjective functional status/changes:   [] No changes reported  \"I feel like I have a little tension in my neck but not really pain. \"    OBJECTIVE    40 min Therapeutic Exercise:  [x] See flow sheet :   Rationale: increase ROM, increase strength, improve coordination and increase proprioception to improve the patients ability to use her hand to perform functional tasks    With   [] TE   [] TA   [] neuro   [] other: Patient Education: [x] Review HEP    [] Progressed/Changed HEP based on:   [] positioning   [] body mechanics   [] transfers   [] heat/ice application   [] Splint wear/care   [] Sensory re-education   [] scar management      [] other:             Other Objective/Functional Measures: Strength:   Measurements: Taken with Mandeep Dynamometer, in Lbs   Level 2 10/2/17 Date Date Date Date Date Date   Right 25               Left 53               Deficit 51%               Change                      Pinch Measurements: Taken with Pinch Gauge, in Lbs   (hand) 10/2/17 Date Date Date Date Date   Lateral                Right 3             Left  11             Deficit               Change               Pad               Right 2             Left 6             Deficit               Change               Rickey               Right 1             Left 8             Deficit               Change                  Sensation:   Improved since surgery, only occasion warm sensation     Upper Extremity Manual Muscle Test  Nerve  Nerve root  Brachial Plexus Origin Muscle 10/2/17 Date Date Date   Long Thoracic  C5,6,7,(8)  Pre-trunk               Serratus Anterior           Lower Subscapular  C(4),5,6,(7)  Posterior Cord               Teres Major           Lateral Pectoral  C5,6,7  Lateral Cord               Pectoralis Major-Clavicular Portion             Pectoralis Major-Sternal Portion           Thoracodorsal  C(5),6,7,8  Posterior Cord               Latissimus Dorsi           Musculocutaneous  C(4),5,6,7  Lateral Cord               Biceps and Brachialis             Coracobrachialis           Axillary  C5,6  Posterior Cord               Anterior Deltoid             Middle Deltoid             Posterior Deltoid             Teres Minor           Anterior Interosseous of     the Median Nerve  C(5),6,7,8,T1  Lateral and Medial     Cords               Pronatus Quadratus 5           Flexor Digitorum Profundus 1,2 3           Flexor Pollicis Longus 3         Median Nerve  C(5),6,7,8,T1  Lateral and Medial Cords               Pronator Teres             Flexor Carpi Radialis 4           Flexor Digitorum Superficialis 3           Palmaris Longus             Flexor Pollicis Brevis (Superficial Head)             Abductor Pollicis Brevis 3           Opponens Pollicis 3           Lumbricales 1,2 3         Radial Nerve  C5,6,7,8,(T1)  Posterior Cord               Triceps 5           Supinator 5           Brachioradialis 5           Extensor Carpi Radialis 5           Extensor Carpi Ulnaris 5           Extensor Digitorum Communis 4           Extensor Digit Quinti 4           Extensor Digiti Proprius 3           Extensor Pollicis Longus 3           Extensor Pollicis Brevis 3           Abductor Pollicis Longus 4         Ulnar Nerve  C(7),8,T1  Medial Cord               Flexor Carpi Ulnaris 4           Flexor Digitorum Profundus 3,4 3           Abductor Digiti Quinti 2           Adductor Pollicis 4         Opponens Digiti Quinti 3           Dorsal Interosseous 1 3           Dorsal Interosseous 2 3           Dorsal Interosseous 3 3           Dorsal Interosseous 4 3           Volar Interosseous 1 3           Volar Interosseous 2 3           Volar Interosseous 3 3           Lumbricales 3,4 3           Flexor Pollicis Brevis (deep head) 3            Key  5                    Normal           Full range against gravity & maximum resistance  4                    Good                                   Full range against gravity &  resistance  3                    Fair                                      Full range against gravity  2                    Poor                                     Full range gravity eliminated  1                    Trace                                   Perceptible contraction, no movement of part  0                    Zero                                     No contraction         Pain Level (0-10 scale) post treatment: 0/10    ASSESSMENT/Changes in Function: Patient requiring increased time to complete her FM tasks. Patient will continue to benefit from skilled OT services to address ROM deficits, address strength deficits and analyze and cue movement patterns to attain remaining goals. [x]  See Plan of Care  []  See progress note/recertification  []  See Discharge Summary         Progress towards goals / Updated goals:  Short Term Goals: To be accomplished in 2  weeks:  Goal:* Patient will be compliant with initial home exercise program to take an active role in their rehabilitation process. Status at Eval:   Patient was provided with a basic home exercise program including median nerve glides, brachial plexus glides, and tendon glides for strengthening and improving hand coordination.         Goal:* Patient will demonstrate a good understanding of their condition and strategies for self-management.   Status at Eval:  Patient received an initial evaluation today followed by education as to diagnosis, precautions and treatment plan.      Long Term Goals: To be accomplished in 4 weeks:                        Goal:* Pt will have 40 pounds of  in the right hand to allow for functional grasp for all ADL activities including dressing, bathing and self care. At Eval: 25     Goal:* Patient will have improved right lateral pinch strength of  6 pounds in order to perform fine motor tasks such as turning pages, turning ignition and open containers. .  At Eval: 3     Goal:* Patient will have improved right tip pinch strength of 4 pounds in order to perform fine motor tasks such as zippiing up zippers or opening containers. At Eval: 2     Goal:* Patient will have improved right dorothy pinch strength of 3 pounds in order to perform fine motor tasks such as writing. At Eval: 1     Goal:* Patient will show a 10 point improvement on FOTO functional status measure to improve overall functional performance.   Status at Eval: 46    PLAN  []  Upgrade activities as tolerated     [x]  Continue plan of care  []  Update interventions per flow sheet       []  Discharge due to:_  []  Other:_      Alfreida Latch, OT 10/5/2017  2:24 PM    Future Appointments  Date Time Provider Brenda Randle   10/5/2017 3:00 PM Alfreida Latch, OT [facility]   10/6/2017 11:00 AM Desi Lawson  [address]   10/10/2017 1:30 PM Alfreida Latch, OT [facility]   10/12/2017 12:00 PM Alfreida Latch, OT [facility]   10/17/2017 12:30 PM Alfreida Latch, OT [facility]   10/20/2017 12:00 PM Alfreida Latch, OT [facility]   10/24/2017 1:00 PM Alfreida Latch, OT [facility]   10/26/2017 1:00 PM Alfreida Latch, OT [facility]   10/31/2017 1:00 PM Alfreida Latch, OT [facility]   11/2/2017 1:00 PM Alfreida Latch, OT [facility]   11/6/2017 2:00 PM Desirae Lucero [facility]   11/9/2017 1:00 PM Alfreida Latch, OT [facility]

## 2017-10-06 ENCOUNTER — OFFICE VISIT (OUTPATIENT)
Dept: ORTHOPEDIC SURGERY | Age: 57
End: 2017-10-06

## 2017-10-06 VITALS
HEART RATE: 69 BPM | TEMPERATURE: 98.8 F | RESPIRATION RATE: 14 BRPM | SYSTOLIC BLOOD PRESSURE: 128 MMHG | DIASTOLIC BLOOD PRESSURE: 79 MMHG | BODY MASS INDEX: 35.17 KG/M2 | HEIGHT: 64 IN | WEIGHT: 206 LBS

## 2017-10-06 DIAGNOSIS — Z98.1 S/P CERVICAL SPINAL FUSION: Primary | ICD-10-CM

## 2017-10-06 DIAGNOSIS — M50.20 HNP (HERNIATED NUCLEUS PULPOSUS), CERVICAL: ICD-10-CM

## 2017-10-06 NOTE — LETTER
10/6/2017 11:25 AM 
 
Ms. Italo Mera [address] [facility] [id number] To Whom It May Concern: 
 
Italo Mera is currently under the care of [facility]. She is able to return to work on 10/11/17 full duty without restrictions. If there are questions or concerns please have the patient contact our office.  
 
 
 
Sincerely, 
 
 
 
 
 
Alison Harris MD

## 2017-10-06 NOTE — PROGRESS NOTES
[facility] [address]  [address], [address]  [place], [facility] [address]  Phone: [phone]  Fax: [phone]  PROGRESS NOTE  Patient: Josee Dawson                MRN: [id number]       SSN: [id number]  YOB: 1960        AGE: 62 y.o. SEX: female  Body mass index is 35.36 kg/(m^2). PCP: Slim Amaya MD  10/06/17    Chief Complaint   Patient presents with    Neck Pain     5 week PO       HISTORY OF PRESENT ILLNESS, RADIOGRAPHS, and PLAN:     HISTORY:  Ms. Any Servin returns today. She is six weeks out from her cervical fusion. She is doing dramatically better with improvement in her neck pain and resolution of much of her radicular arm pain. She has normal strength and sensation on exam.     ASSESSMENT/PLAN: I am going to have her in physical therapy and allow her to return to her work. I will see her back again in six weeks time. cc: Slim Amaya MD         Past Medical History:   Diagnosis Date    Diabetes ([facility]     High cholesterol     Hypertension        Family History   Problem Relation Age of Onset    Diabetes Mother     Diabetes Sister     Hypertension Sister     Diabetes Other     Hypertension Other        Current Outpatient Prescriptions   Medication Sig Dispense Refill    promethazine (PHENERGAN) 25 mg tablet Take 25 mg by mouth every six (6) hours as needed for Nausea.  simvastatin (ZOCOR) 10 mg tablet TAKE 1 TABLET BY MOUTH ONCE A DAY WITH DINNER      esomeprazole (NEXIUM) 40 mg capsule Take 40 mg by mouth.  gabapentin (NEURONTIN) 100 mg capsule Take 100 mg by mouth.  hydroCHLOROthiazide (HYDRODIURIL) 25 mg tablet Take 25 mg by mouth.  metFORMIN (GLUCOPHAGE) 1,000 mg tablet Take 1,000 mg by mouth.  glimepiride (AMARYL) 4 mg tablet Take 4 mg by mouth.  VITAMIN E-400 PO Take  by mouth.  cholecalciferol (VITAMIN D3) 1,000 unit tablet Take  by mouth daily.       cyanocobalamin (VITAMIN B-12) 1,000 mcg tablet Take 1,000 mcg by mouth daily.  oxyCODONE-acetaminophen (PERCOCET)  mg per tablet Take 1 Tab by mouth every six (6) hours as needed for Pain. Max Daily Amount: 4 Tabs. Indications: acute post op pain 30 Tab 0       Allergies   Allergen Reactions    Ibuprofen Shortness of Breath    Seafood Nausea Only and Swelling       Past Surgical History:   Procedure Laterality Date    HX BREAST BIOPSY      HX CHOLECYSTECTOMY      HX HERNIA REPAIR         Past Medical History:   Diagnosis Date    Diabetes ([facility]     High cholesterol     Hypertension        Social History     Social History    Marital status: UNKNOWN     Spouse name: N/A    Number of children: N/A    Years of education: N/A     Occupational History    Not on file. Social History Main Topics    Smoking status: Former Smoker    Smokeless tobacco: Never Used      Comment: quit 2008 (approx)    Alcohol use No    Drug use: No    Sexual activity: Not on file     Other Topics Concern    Not on file     Social History Narrative         REVIEW OF SYSTEMS:   CONSTITUTIONAL SYMPTOMS:  Negative. EYES:  Negative. EARS, NOSE, THROAT AND MOUTH:  Negative. CARDIOVASCULAR:  Negative. RESPIRATORY:  Negative. GENITOURINARY: Negative. GASTROINTESTINAL:  Negative. INTEGUMENTARY (SKIN AND/OR BREAST):  Negative. MUSCULOSKELETAL: Per HPI.   ENDOCRINE/RHEUMATOLOGIC:  Negative. NEUROLOGICAL:  Per HPI. HEMATOLOGIC/LYMPHATIC:  Negative. ALLERGIC/IMMUNOLOGIC:  Negative. PSYCHIATRIC:  Negative. PHYSICAL EXAMINATION:   Visit Vitals    /79    Pulse 69    Temp 98.8 °F (37.1 °C) (Oral)    Resp 14    Ht 5' 4\" (1.626 m)    Wt 206 lb (93.4 kg)    BMI 35.36 kg/m2    PAIN SCALE: 2/10    CONSTITUTIONAL: The patient is in no apparent distress and is alert and oriented x 3. HEENT: Normocephalic. Hearing grossly intact. NECK: Supple and symmetric. no tenderness, or masses were felt.   RESPIRATORY: No labored breathing. CARDIOVASCULAR: The carotid pulses were normal. Peripheral pulses were 2+. CHEST: Normal AP diameter and normal contour without any kyphoscoliosis. LYMPHATIC: No lymphadenopathy was appreciated in the neck, axillae or groin. SKIN:  Incision healing well, no drainage, no erythema, no hernia, no seroma, no swelling, no dehiscence, incision well approximated. Negative for scars, rashes, lesions, or ulcers on the right upper, right lower, left upper, left lower and trunk. NEUROLOGICAL: Alert and oriented x 3. Ambulation without assistive device. FWB. EXTREMITIES: See musculoskeletal.  MUSCULOSKELETAL:   Head and Neck:  Negative for misalignment, asymmetry, crepitation, defects, tenderness masses or effusions.  Left Upper Extremity: Inspection, percussion and palpation preformed. Minors sign is negative.  Right Upper Extremity: Inspection, percussion and palpation preformed. Minors sign is negative.  Spine, Ribs and Pelvis: Inspection, percussion and palpation preformed. Negative for misalignment, asymmetry, crepitation, defects, tenderness masses or effusions.  Left Lower Extremity: Inspection, percussion and palpation preformed. Negative straight leg raise.  Right Lower Extremity: Inspection, percussion and palpation preformed. Negative straight leg raise. SPINE EXAM:     Cervical spine: Neck is midline. Normal muscle tone. No focal atrophy is noted. ASSESSMENT    ICD-10-CM ICD-9-CM    1. S/P cervical spinal fusion Z98.1 V45.4    2. HNP (herniated nucleus pulposus), cervical M50.20 722.0 AMB POC XRAY, SPINE, CERVICAL; 2 OR 3       Written by Taty Hudson, as dictated by Beatrice Borja MD.    I, Dr. Beatrice Borja MD, confirm that all documentation is accurate.

## 2017-10-10 ENCOUNTER — HOSPITAL ENCOUNTER (OUTPATIENT)
Dept: PHYSICAL THERAPY | Age: 57
Discharge: HOME | End: 2017-10-10
Payer: COMMERCIAL

## 2017-10-10 PROCEDURE — 97110 THERAPEUTIC EXERCISES: CPT

## 2017-10-10 NOTE — PROGRESS NOTES
OT DAILY TREATMENT NOTE  3-16    Patient Name: Grady Fonseca  Date:10/10/2017  : 1960  [x]  Patient  Verified  Payor: Williams Combs / Plan: [employer] HMO / Product Type: HMO /    In time:1330 Out time:1400  Total Treatment Time (min):30  Visit #: 3 of 8    Treatment Area: Right hand weakness [R29.898]    SUBJECTIVE  Pain Level (0-10 scale): 0/10  Any medication changes, allergies to medications, adverse drug reactions, diagnosis change, or new procedure performed?: [x] No    [] Yes (see summary sheet for update)  Subjective functional status/changes:   [] No changes reported      OBJECTIVE      30 min Therapeutic Exercise:  [] See flow sheet :   Rationale: increase ROM, increase strength and improve coordination to improve the patients ability to use the hand with functional activities.       With   [] TE   [] TA   [] neuro   [] other: Patient Education: [x] Review HEP    [] Progressed/Changed HEP based on:   [] positioning   [] body mechanics   [] transfers   [] heat/ice application   [] Splint wear/care   [] Sensory re-education   [] scar management      [] other:             Other Objective/Functional Measures: Strength:   Measurements: Taken with Mandeep Dynamometer, in Lbs   Level 2 10/2/17 Date Date Date Date Date Date   Right 25                     Left 53                     Deficit 51%                     Change                             Pinch Measurements: Taken with Pinch Gauge, in Lbs   (hand) 10/2/17 Date Date Date Date Date   Lateral                      Right 3                  Left  11                  Deficit                     Change                     Pad                     Right 2                  Left 6                  Deficit                     Change                     Rickey                     Right 1                  Left 8                  Deficit                     Change                         Sensation:   Improved since surgery, only occasion warm sensation      Upper Extremity Manual Muscle Test  Nerve  Nerve root  Brachial Plexus Origin Muscle 10/2/17 Date Date Date   Long Thoracic  C5,6,7,(8)  Pre-trunk                     Serratus Anterior               Lower Subscapular  C(4),5,6,(7)  Posterior Cord                     Teres Major               Lateral Pectoral  C5,6,7  Lateral Cord                     Pectoralis Major-Clavicular Portion                  Pectoralis Major-Sternal Portion               Thoracodorsal  C(5),6,7,8  Posterior Cord                     Latissimus Dorsi               Musculocutaneous  C(4),5,6,7  Lateral Cord                     Biceps and Brachialis                  Coracobrachialis               Axillary  C5,6  Posterior Cord                     Anterior Deltoid                  Middle Deltoid                  Posterior Deltoid                  Teres Minor               Anterior Interosseous of     the Median Nerve  C(5),6,7,8,T1  Lateral and Medial     Cords                     Pronatus Quadratus 5               Flexor Digitorum Profundus 1,2 3               Flexor Pollicis Longus 3            Median Nerve  C(5),6,7,8,T1  Lateral and Medial Cords                     Pronator Teres                  Flexor Carpi Radialis 4               Flexor Digitorum Superficialis 3               Palmaris Longus                  Flexor Pollicis Brevis (Superficial Head)                  Abductor Pollicis Brevis 3               Opponens Pollicis 3               Lumbricales 1,2 3            Radial Nerve  C5,6,7,8,(T1)  Posterior Cord                     Triceps 5               Supinator 5               Brachioradialis 5               Extensor Carpi Radialis 5               Extensor Carpi Ulnaris 5               Extensor Digitorum Communis 4               Extensor Digit Quinti 4               Extensor Digiti Proprius 3               Extensor Pollicis Longus 3               Extensor Pollicis Brevis 3               Abductor Pollicis Longus 4            Ulnar Nerve  C(7),8,T1  Medial Cord                     Flexor Carpi Ulnaris 4               Flexor Digitorum Profundus 3,4 3               Abductor Digiti Quinti 2               Adductor Pollicis 4               Opponens Digiti Quinti 3               Dorsal Interosseous 1 3               Dorsal Interosseous 2 3               Dorsal Interosseous 3 3               Dorsal Interosseous 4 3               Volar Interosseous 1 3               Volar Interosseous 2 3               Volar Interosseous 3 3               Lumbricales 3,4 3               Flexor Pollicis Brevis (deep head) 3                Key  5                    Normal           Full range against gravity & maximum resistance  4                    Good                                   Full range against gravity &  resistance  3                    Fair                                      Full range against gravity  2                    Poor                                     Full range gravity eliminated  1                    Trace                                   Perceptible contraction, no movement of part  0                    Zero                                     No contraction            Pain Level (0-10 scale) post treatment: 0/10     ASSESSMENT/Changes in Function: improved endurance for activities today.     Patient will continue to benefit from skilled OT services to address ROM deficits, address strength deficits and analyze and cue movement patterns to attain remaining goals.      [x]  See Plan of Care  []  See progress note/recertification  []  See Discharge Summary      Progress towards goals / Updated goals:  Short Term Goals: To be accomplished in 2  weeks:  Goal:* Patient will be compliant with initial home exercise program to take an active role in their rehabilitation process.   Status at al:   Patient was provided with a basic home exercise program including median nerve glides, brachial plexus glides, and tendon glides for strengthening and improving hand coordination.           Goal:* Patient will demonstrate a good understanding of their condition and strategies for self-management. Status at Eval:  Patient received an initial evaluation today followed by education as to diagnosis, precautions and treatment plan.       Long Term Goals: To be accomplished in 4 weeks:                        Goal:* Pt will have 40 pounds of  in the right hand to allow for functional grasp for all ADL activities including dressing, bathing and self care. At Eval: 25      Goal:* Patient will have improved right lateral pinch strength of  6 pounds in order to perform fine motor tasks such as turning pages, turning ignition and open containers. .  At Eval: 3      Goal:* Patient will have improved right tip pinch strength of 4 pounds in order to perform fine motor tasks such as zippiing up zippers or opening containers. At Eval: 2      Goal:* Patient will have improved right dorothy pinch strength of 3 pounds in order to perform fine motor tasks such as writing. At Eval: 1      Goal:* Patient will show a 10 point improvement on FOTO functional status measure to improve overall functional performance.   Status at Eval: 46    PLAN  []  Upgrade activities as tolerated     [x]  Continue plan of care  []  Update interventions per flow sheet       []  Discharge due to:_  []  Other:_      Gib Kehr, OT 10/10/2017  1:57 PM    Future Appointments  Date Time Provider Brenda Randle   10/12/2017 12:00 PM Hudson Greerhr, OT [facility]   10/17/2017 12:30 PM Gib Kehr, OT [facility]   10/20/2017 12:00 PM Hudson Greerhr, OT [facility]   10/24/2017 1:00 PM Hudson Greerhr, OT [facility]   10/26/2017 1:00 PM Hudson Greerhr, OT [facility]   10/31/2017 1:00 PM Hudson Greerhr, OT [facility]   11/2/2017 1:00 PM Gib Kehr, OT [facility]   11/6/2017 2:00 PM Kyree Lucero [facility]   11/9/2017 1:00 PM [facility]

## 2017-10-12 ENCOUNTER — HOSPITAL ENCOUNTER (OUTPATIENT)
Dept: PHYSICAL THERAPY | Age: 57
Discharge: HOME | End: 2017-10-12
Payer: COMMERCIAL

## 2017-10-12 PROCEDURE — 97110 THERAPEUTIC EXERCISES: CPT

## 2017-10-12 NOTE — PROGRESS NOTES
OT DAILY TREATMENT NOTE  3-16    Patient Name: Khushboo Zavala  Date:10/12/2017  : 1960  [x]  Patient  Verified  Payor: Rishabh Purdy / Plan: [employer] Document Preparation HMO / Product Type: HMO /    In time:1200  Out time:1240  Total Treatment Time (min): 36  Visit #: 4 of 8    Treatment Area: Right hand weakness [R29.898]    SUBJECTIVE  Pain Level (0-10 scale): 0/10  Any medication changes, allergies to medications, adverse drug reactions, diagnosis change, or new procedure performed?: [x] No    [] Yes (see summary sheet for update)  Subjective functional status/changes:   [x] No changes reported       OBJECTIVE    40* min Therapeutic Exercise:  [x] See flow sheet :   Rationale: increase ROM, increase strength and improve coordination to improve the patients ability to manipulate objects with fingers    With   [] TE   [] TA   [] neuro   [] other: Patient Education: [x] Review HEP    [] Progressed/Changed HEP based on:   [] positioning   [] body mechanics   [] transfers   [] heat/ice application   [] Splint wear/care   [] Sensory re-education   [] scar management      [] other:             Other Objective/Functional Measures:  Strength:   Measurements: Taken with Mandeep Dynamometer, in Lbs   Level 2 10/2/17 Date Date Date Date Date Date   Right 25                     Left 53                     Deficit 51%                     Change                             Pinch Measurements: Taken with Pinch Gauge, in Lbs   (hand) 10/2/17 Date Date Date Date Date   Lateral                      Right 3                  Left  11                  Deficit                     Change                     Pad                     Right 2                  Left 6                  Deficit                     Change                     Rickey                     Right 1                  Left 8                  Deficit                     Change                         Sensation:   Improved since surgery, only occasion warm sensation      Upper Extremity Manual Muscle Test  Nerve  Nerve root  Brachial Plexus Origin Muscle 10/2/17 Date Date Date   Long Thoracic  C5,6,7,(8)  Pre-trunk                     Serratus Anterior               Lower Subscapular  C(4),5,6,(7)  Posterior Cord                     Teres Major               Lateral Pectoral  C5,6,7  Lateral Cord                     Pectoralis Major-Clavicular Portion                  Pectoralis Major-Sternal Portion               Thoracodorsal  C(5),6,7,8  Posterior Cord                     Latissimus Dorsi               Musculocutaneous  C(4),5,6,7  Lateral Cord                     Biceps and Brachialis                  Coracobrachialis               Axillary  C5,6  Posterior Cord                     Anterior Deltoid                  Middle Deltoid                  Posterior Deltoid                  Teres Minor               Anterior Interosseous of     the Median Nerve  C(5),6,7,8,T1  Lateral and Medial     Cords                     Pronatus Quadratus 5               Flexor Digitorum Profundus 1,2 3               Flexor Pollicis Longus 3            Median Nerve  C(5),6,7,8,T1  Lateral and Medial Cords                     Pronator Teres                  Flexor Carpi Radialis 4               Flexor Digitorum Superficialis 3               Palmaris Longus                  Flexor Pollicis Brevis (Superficial Head)                  Abductor Pollicis Brevis 3               Opponens Pollicis 3               Lumbricales 1,2 3            Radial Nerve  C5,6,7,8,(T1)  Posterior Cord                     Triceps 5               Supinator 5               Brachioradialis 5               Extensor Carpi Radialis 5               Extensor Carpi Ulnaris 5               Extensor Digitorum Communis 4               Extensor Digit Quinti 4               Extensor Digiti Proprius 3               Extensor Pollicis Longus 3               Extensor Pollicis Brevis 3               Abductor Pollicis Longus 4            Ulnar Nerve  C(7),8,T1  Medial Cord                     Flexor Carpi Ulnaris 4               Flexor Digitorum Profundus 3,4 3               Abductor Digiti Quinti 2               Adductor Pollicis 4               Opponens Digiti Quinti 3               Dorsal Interosseous 1 3               Dorsal Interosseous 2 3               Dorsal Interosseous 3 3               Dorsal Interosseous 4 3               Volar Interosseous 1 3               Volar Interosseous 2 3               Volar Interosseous 3 3               Lumbricales 3,4 3               Flexor Pollicis Brevis (deep head) 3                Key  5                    Normal           Full range against gravity & maximum resistance  4                    Good                                   Full range against gravity &  resistance  3                    Fair                                      Full range against gravity  2                    Poor                                     Full range gravity eliminated  1                    Trace                                   Perceptible contraction, no movement of part  0                    Zero                                     No contraction              Pain Level (0-10 scale) post treatment: 0/10      ASSESSMENT/Changes in Function: introduced C exercises for improving opposition      Patient will continue to benefit from skilled OT services to address ROM deficits, address strength deficits and analyze and cue movement patterns to attain remaining goals.      [x]  See Plan of Care  []  See progress note/recertification  []  See Discharge Summary      Progress towards goals / Updated goals:  Short Term Goals: To be accomplished in 2  weeks:  Goal:* Patient will be compliant with initial home exercise program to take an active role in their rehabilitation process.   Status at Eval:   Patient was provided with a basic home exercise program including median nerve glides, brachial plexus glides, and tendon glides for strengthening and improving hand coordination.           Goal:* Patient will demonstrate a good understanding of their condition and strategies for self-management. Status at Eval:  Patient received an initial evaluation today followed by education as to diagnosis, precautions and treatment plan.       Long Term Goals: To be accomplished in 4 weeks:                        Goal:* Pt will have 40 pounds of  in the right hand to allow for functional grasp for all ADL activities including dressing, bathing and self care. At Eval: 25      Goal:* Patient will have improved right lateral pinch strength of  6 pounds in order to perform fine motor tasks such as turning pages, turning ignition and open containers. .  At Eval: 3      Goal:* Patient will have improved right tip pinch strength of 4 pounds in order to perform fine motor tasks such as zippiing up zippers or opening containers. At Eval: 2      Goal:* Patient will have improved right dorothy pinch strength of 3 pounds in order to perform fine motor tasks such as writing. At Eval: 1      Goal:* Patient will show a 10 point improvement on FOTO functional status measure to improve overall functional performance.   Status at Eval: Brenda Jones  []  Upgrade activities as tolerated     [x]  Continue plan of care  []  Update interventions per flow sheet       []  Discharge due to:_  []  Other:_      Pamela Murphy, OT 10/12/2017  11:58 AM    Future Appointments  Date Time Provider Brenda Randle   10/12/2017 12:00 PM Gwendlyn Mediate, OT [facility]   10/17/2017 12:30 PM Gwendlyn Mediate, OT [facility]   10/20/2017 12:00 PM Gwendlyn Mediate, OT [facility]   10/24/2017 1:00 PM Gwendlyn Mediate, OT [facility]   10/26/2017 1:00 PM Gwendlyn Mediate, OT [facility]   10/31/2017 1:00 PM Gwendlyn Mediate, OT [facility]   11/2/2017 1:00 PM Gwendlyn Mediate, OT [facility]   11/6/2017 2:00 PM Vivian Lucero [facility]   11/9/2017 1:00 PM [facility]agan [facility]

## 2017-10-17 ENCOUNTER — APPOINTMENT (OUTPATIENT)
Dept: PHYSICAL THERAPY | Age: 57
End: 2017-10-17
Payer: COMMERCIAL

## 2017-10-17 ENCOUNTER — HOSPITAL ENCOUNTER (OUTPATIENT)
Dept: PHYSICAL THERAPY | Age: 57
Discharge: HOME | End: 2017-10-17
Payer: COMMERCIAL

## 2017-10-17 PROCEDURE — 97110 THERAPEUTIC EXERCISES: CPT

## 2017-10-17 NOTE — PROGRESS NOTES
OT DAILY TREATMENT NOTE  3-16    Patient Name: Ale Chau  Date:10/17/2017  : 1960  [x]  Patient  Verified  Payor: Corine Carter / Plan: [employer] HMO / Product Type: HMO /    In time:1230  Out time:1330  Total Treatment Time (min): 60  Visit #: 5 of 8    Treatment Area: Right hand weakness [R29.898]    SUBJECTIVE  Pain Level (0-10 scale): 0/10  Any medication changes, allergies to medications, adverse drug reactions, diagnosis change, or new procedure performed?: [x] No    [] Yes (see summary sheet for update)  Subjective functional status/changes:   [x] No changes reported      OBJECTIVE      60 min Therapeutic Exercise:  [x] See flow sheet :   Rationale: increase ROM and increase strength to improve the patients ability to use the right hand with functional tasks.     With   [] TE   [] TA   [] neuro   [] other: Patient Education: [x] Review HEP    [] Progressed/Changed HEP based on:   [] positioning   [] body mechanics   [] transfers   [] heat/ice application   [] Splint wear/care   [] Sensory re-education   [] scar management      [] other:             Other Objective/Functional Measures:  Strength:   Measurements: Taken with Mandeep Dynamometer, in Lbs   Level 2 10/2/17 10/17/17 Date Date Date Date Date   Right 25   31                  Left 48   56                  Deficit 51%                     Change    R=+6, L=+3                         Pinch Measurements: Taken with Pinch Gauge, in Lbs   (hand) 10/2/17 10/17/17 Date Date Date Date   Lateral                      Right 3 3                 Left  11 15                 Deficit                     Change      L+4               Pad                     Right 2  2                Left 6  9                Deficit                     Change      L +3               Rickey                     Right 1  3                Left 8  9                Deficit                     Change      L + 1                   Sensation:   Improved since surgery, only occasion warm sensation      Upper Extremity Manual Muscle Test  Nerve  Nerve root  Brachial Plexus Origin Muscle 10/2/17 Date Date Date   Long Thoracic  C5,6,7,(8)  Pre-trunk                     Serratus Anterior               Lower Subscapular  C(4),5,6,(7)  Posterior Cord                     Teres Major               Lateral Pectoral  C5,6,7  Lateral Cord                     Pectoralis Major-Clavicular Portion                  Pectoralis Major-Sternal Portion               Thoracodorsal  C(5),6,7,8  Posterior Cord                     Latissimus Dorsi               Musculocutaneous  C(4),5,6,7  Lateral Cord                     Biceps and Brachialis                  Coracobrachialis               Axillary  C5,6  Posterior Cord                     Anterior Deltoid                  Middle Deltoid                  Posterior Deltoid                  Teres Minor               Anterior Interosseous of     the Median Nerve  C(5),6,7,8,T1  Lateral and Medial     Cords                     Pronatus Quadratus 5               Flexor Digitorum Profundus 1,2 3               Flexor Pollicis Longus 3            Median Nerve  C(5),6,7,8,T1  Lateral and Medial Cords                     Pronator Teres                  Flexor Carpi Radialis 4               Flexor Digitorum Superficialis 3               Palmaris Longus                  Flexor Pollicis Brevis (Superficial Head)                  Abductor Pollicis Brevis 3               Opponens Pollicis 3               Lumbricales 1,2 3            Radial Nerve  C5,6,7,8,(T1)  Posterior Cord                     Triceps 5               Supinator 5               Brachioradialis 5               Extensor Carpi Radialis 5               Extensor Carpi Ulnaris 5               Extensor Digitorum Communis 4               Extensor Digit Quinti 4               Extensor Digiti Proprius 3               Extensor Pollicis Longus 3               Extensor Pollicis Brevis 3               Abductor Pollicis Longus 4            Ulnar Nerve  C(7),8,T1  Medial Cord                     Flexor Carpi Ulnaris 4               Flexor Digitorum Profundus 3,4 3               Abductor Digiti Quinti 2               Adductor Pollicis 4               Opponens Digiti Quinti 3               Dorsal Interosseous 1 3               Dorsal Interosseous 2 3               Dorsal Interosseous 3 3               Dorsal Interosseous 4 3               Volar Interosseous 1 3               Volar Interosseous 2 3               Volar Interosseous 3 3               Lumbricales 3,4 3               Flexor Pollicis Brevis (deep head) 3                Key  5                    Normal           Full range against gravity & maximum resistance  4                    Good                                   Full range against gravity &  resistance  3                    Fair                                      Full range against gravity  2                    Poor                                     Full range gravity eliminated  1                    Trace                                   Perceptible contraction, no movement of part  0                    Zero                                     No contraction              Pain Level (0-10 scale) post treatment: 0/10      ASSESSMENT/Changes in Function: Paitent provided with a figure 8 splint for reduction of MP hyperextension and lumbrical initiation.        Patient will continue to benefit from skilled OT services to address ROM deficits, address strength deficits and analyze and cue movement patterns to attain remaining goals.      [x]  See Plan of Care  []  See progress note/recertification  []  See Discharge Summary      Progress towards goals / Updated goals:  Short Term Goals: To be accomplished in 2  weeks:  Goal:* Patient will be compliant with initial home exercise program to take an active role in their rehabilitation process.   Status at al:   Patient was provided with a basic home exercise program including median nerve glides, brachial plexus glides, and tendon glides for strengthening and improving hand coordination. Status as last note/recert: Goal met      Goal:* Patient will demonstrate a good understanding of their condition and strategies for self-management. Status at Eval:  Patient received an initial evaluation today followed by education as to diagnosis, precautions and treatment plan. Status as last note/recert: Goal met    Long Term Goals: To be accomplished in 4 weeks:                        Goal:* Pt will have 40 pounds of  in the right hand to allow for functional grasp for all ADL activities including dressing, bathing and self care. At Eval: 25  Status as last note/recert:31        Goal:* Patient will have improved right lateral pinch strength of  6 pounds in order to perform fine motor tasks such as turning pages, turning ignition and open containers. .  At Eval: 3  Status as last note/recert: 3        Goal:* Patient will have improved right tip pinch strength of 4 pounds in order to perform fine motor tasks such as zippiing up zippers or opening containers. At Eval: 2  Status as last note/recert: 2        Goal:* Patient will have improved right dorothy pinch strength of 3 pounds in order to perform fine motor tasks such as writing. At Eval: 1  Status as last note/recert: 3        Goal:* Patient will show a 10 point improvement on FOTO functional status measure to improve overall functional performance.   Status at Eval: 46  Status as last note/recert:      PLAN  []  Upgrade activities as tolerated     [x]  Continue plan of care  []  Update interventions per flow sheet       []  Discharge due to:_  []  Other:_      Kayleigh Aj OT 10/17/2017  1:02 PM    Future Appointments  Date Time Provider Brenda Randle   10/20/2017 12:00 PM Kayleigh Aj OT [facility]   10/24/2017 1:00 PM Kayleigh Aj OT [facility]   10/26/2017 1:00 PM Isabell Alvarez Rehan, OT [facility]   10/31/2017 1:00 PM Joey Robertson OT [facility]   11/2/2017 1:00 PM Joey Robertson OT [facility]   11/6/2017 2:00 PM Artem Clarke [facility]   11/9/2017 12:00 PM Joey Robertson, MAISHA [facility]

## 2017-10-20 ENCOUNTER — HOSPITAL ENCOUNTER (OUTPATIENT)
Dept: PHYSICAL THERAPY | Age: 57
Discharge: HOME | End: 2017-10-20
Payer: COMMERCIAL

## 2017-10-20 PROCEDURE — 97110 THERAPEUTIC EXERCISES: CPT

## 2017-10-20 NOTE — PROGRESS NOTES
OT DAILY TREATMENT NOTE  3-16    Patient Name: Jessica Sears  Date:10/20/2017  : 1960  [x]  Patient  Verified  Payor: Isa N[place] / Plan: [employer] H[employer] / Product Type: HMO /    In time:1200  Out time:1225  Total Treatment Time (min): 25  Visit #: 6 of 8    Treatment Area: Right hand weakness [R29.898]    SUBJECTIVE  Pain Level (0-10 scale):0/10  Any medication changes, allergies to medications, adverse drug reactions, diagnosis change, or new procedure performed?: [x] No    [] Yes (see summary sheet for update)  Subjective functional status/changes:   [x] No changes reported      OBJECTIVE      25 min Therapeutic Exercise:  [x] See flow sheet :   Rationale: increase ROM, increase strength and improve coordination to improve the patients ability to improve functional strength of the right hand    With   [] TE   [] TA   [] neuro   [] other: Patient Education: [x] Review HEP    [] Progressed/Changed HEP based on:   [] positioning   [] body mechanics   [] transfers   [] heat/ice application   [] Splint wear/care   [] Sensory re-education   [] scar management      [] other:             Other Objective/Functional Measures: Strength:   Measurements: Taken with Mandeep Dynamometer, in Lbs   Level 2 10/2/17 10/17/17 Date Date Date Date Date   Right 25   31                  Left 53   56                  Deficit 51%                     Change    R=+6, L=+3                         Pinch Measurements: Taken with Pinch Gauge, in Lbs   (hand) 10/2/17 10/17/17 Date Date Date Date   Lateral                      Right 3 3                 Left  11 15                 Deficit                     Change      L+4               Pad                     Right 2  2                Left 6  9                Deficit                     Change      L +3               Rickey                     Right 1  3                Left 8  9                Deficit                     Change      L + 1                 Sensation:   Improved since surgery, only occasion warm sensation      Upper Extremity Manual Muscle Test  Nerve  Nerve root  Brachial Plexus Origin Muscle 10/2/17 10/20/17 Date Date   Long Thoracic  C5,6,7,(8)  Pre-trunk                     Serratus Anterior               Lower Subscapular  C(4),5,6,(7)  Posterior Cord                     Teres Major               Lateral Pectoral  C5,6,7  Lateral Cord                     Pectoralis Major-Clavicular Portion                  Pectoralis Major-Sternal Portion               Thoracodorsal  C(5),6,7,8  Posterior Cord                     Latissimus Dorsi               Musculocutaneous  C(4),5,6,7  Lateral Cord                     Biceps and Brachialis                  Coracobrachialis               Axillary  C5,6  Posterior Cord                     Anterior Deltoid                  Middle Deltoid                  Posterior Deltoid                  Teres Minor               Anterior Interosseous of     the Median Nerve  C(5),6,7,8,T1  Lateral and Medial     Cords                     Pronatus Quadratus 5               Flexor Digitorum Profundus 1,2 3   3            Flexor Pollicis Longus 3   3         Median Nerve  C(5),6,7,8,T1  Lateral and Medial Cords                     Pronator Teres                  Flexor Carpi Radialis 4   5            Flexor Digitorum Superficialis 3   3+            Palmaris Longus                  Flexor Pollicis Brevis (Superficial Head)     3             Abductor Pollicis Brevis 3   5+            Opponens Pollicis 3   3            Lumbricales 1,2 3   3         Radial Nerve  C5,6,7,8,(T1)  Posterior Cord                     Triceps 5               Supinator 5               Brachioradialis 5               Extensor Carpi Radialis 5               Extensor Carpi Ulnaris 5               Extensor Digitorum Communis 4   4            Extensor Digit Quinti 4   4            Extensor Digiti Proprius 3   3+            Extensor Pollicis Longus 3   3            Extensor Pollicis Brevis 3   0+            Abductor Pollicis Longus 4            Ulnar Nerve  C(7),8,T1  Medial Cord                     Flexor Carpi Ulnaris 4   5            Flexor Digitorum Profundus 3,4 3   4            Abductor Digiti Quinti 2   2            Adductor Pollicis 4   0+            Opponens Digiti Quinti 3   3            Dorsal Interosseous 1 3   3            Dorsal Interosseous 2 3   3            Dorsal Interosseous 3 3   3            Dorsal Interosseous 4 3   3            Volar Interosseous 1 3   3            Volar Interosseous 2 3   3            Volar Interosseous 3 3  3             Lumbricales 3,4 3               Flexor Pollicis Brevis (deep head) 3                Key  5                    Normal           Full range against gravity & maximum resistance  4                    Good                                   Full range against gravity &  resistance  3                    Fair                                      Full range against gravity  2                    Poor                                     Full range gravity eliminated  1                    Trace                                   Perceptible contraction, no movement of part  0                    Zero                                     No contraction              Pain Level (0-10 scale) post treatment: 0/10      ASSESSMENT/Changes in Function: fatiged after exercises.      Patient will continue to benefit from skilled OT services to address ROM deficits, address strength deficits and analyze and cue movement patterns to attain remaining goals.      [x]  See Plan of Care  []  See progress note/recertification  []  See Discharge Summary      Progress towards goals / Updated goals:  Short Term Goals: To be accomplished in 2  weeks:  Goal:* Patient will be compliant with initial home exercise program to take an active role in their rehabilitation process.   Status at [facility]:   Patient was provided with a basic home exercise program including median nerve glides, brachial plexus glides, and tendon glides for strengthening and improving hand coordination. Status as last note/recert: Goal met      Goal:* Patient will demonstrate a good understanding of their condition and strategies for self-management. Status at Eval:  Patient received an initial evaluation today followed by education as to diagnosis, precautions and treatment plan. Status as last note/recert: Goal met     Long Term Goals: To be accomplished in 4 weeks:                        Goal:* Pt will have 40 pounds of  in the right hand to allow for functional grasp for all ADL activities including dressing, bathing and self care. At Eval: 25  Status as last note/recert:31         Goal:* Patient will have improved right lateral pinch strength of  6 pounds in order to perform fine motor tasks such as turning pages, turning ignition and open containers. .  At Eval: 3  Status as last note/recert: 3         Goal:* Patient will have improved right tip pinch strength of 4 pounds in order to perform fine motor tasks such as zippiing up zippers or opening containers. At Eval: 2  Status as last note/recert: 2         Goal:* Patient will have improved right dorothy pinch strength of 3 pounds in order to perform fine motor tasks such as writing. At Eval: 1  Status as last note/recert: 3         Goal:* Patient will show a 10 point improvement on FOTO functional status measure to improve overall functional performance.   Status at Eval: 46  Status as last note/recert:       PLAN  []  Upgrade activities as tolerated     [x]  Continue plan of care  []  Update interventions per flow sheet       []  Discharge due to:_  []  Other:_      Shira Fletcher OT 10/20/2017  1:59 PM    Future Appointments  Date Time Provider Brenda Randle   10/24/2017 1:00 PM Shira Fletcher OT [facility]   10/26/2017 1:00 PM Shira Fletcher OT [facility]   10/31/2017 1:00 PM Shira Fletcher OT [facility]   11/2/2017 1:00 PM Joey Robertson OT [facility]   11/6/2017 2:00 PM Artem Clarke [facility]   11/9/2017 12:00 PM Joey Robertson, MAISHA [facility]

## 2017-10-24 ENCOUNTER — HOSPITAL ENCOUNTER (OUTPATIENT)
Dept: PHYSICAL THERAPY | Age: 57
Discharge: HOME | End: 2017-10-24
Payer: COMMERCIAL

## 2017-10-24 PROCEDURE — 97110 THERAPEUTIC EXERCISES: CPT

## 2017-10-24 NOTE — PROGRESS NOTES
OT DAILY TREATMENT NOTE  3-16    Patient Name: Mary Every  Date:10/24/2017  : 1960  [x]  Patient  Verified  Payor: [facility] / Plan: [employer] / Product Type: HMO /    In time:1310  Out time:1350  Total Treatment Time (min):40  Visit #: 7 of 8    Treatment Area: Right hand weakness [R29.898]    SUBJECTIVE  Pain Level (0-10 scale):0/10  Any medication changes, allergies to medications, adverse drug reactions, diagnosis change, or new procedure performed?: [x] No    [] Yes (see summary sheet for update)  Subjective functional status/changes:   [] No changes reported  \"I feel like the splint is making my hand weaker when I take it off.\"    OBJECTIVE    40 min Therapeutic Exercise:  [x] See flow sheet :   Rationale: increase ROM, increase strength and improve coordination to improve the patients ability to manipulate items with the right hand and thumb      With   [] TE   [] TA   [] neuro   [] other: Patient Education: [x] Review HEP    [] Progressed/Changed HEP based on:   [] positioning   [] body mechanics   [] transfers   [] heat/ice application   [] Splint wear/care   [] Sensory re-education   [] scar management      [] other:             Other Objective/Functional Measures: Strength:   Measurements: Taken with Mandeep Dynamometer, in Lbs   Level 2 10/2/17 10/17/17 Date Date Date Date Date   Right 25   31                  Left 48   56                  Deficit 51%                     Change    R=+6, L=+3                         Pinch Measurements: Taken with Pinch Gauge, in Lbs   (hand) 10/2/17 10/17/17 Date Date Date Date   Lateral                      Right 3 3                 Left  11 15                 Deficit                     Change      L+4               Pad                     Right 2  2                Left 6  9                Deficit                     Change      L +3               Rickey                     Right 1  3                Left 8  9              Deficit                     Change      L + 1                   Sensation:   Improved since surgery, only occasion warm sensation      Upper Extremity Manual Muscle Test  Nerve  Nerve root  Brachial Plexus Origin Muscle 10/2/17 10/20/17 Date Date   Long Thoracic  C5,6,7,(8)  Pre-trunk                     Serratus Anterior               Lower Subscapular  C(4),5,6,(7)  Posterior Cord                     Teres Major               Lateral Pectoral  C5,6,7  Lateral Cord                     Pectoralis Major-Clavicular Portion                  Pectoralis Major-Sternal Portion               Thoracodorsal  C(5),6,7,8  Posterior Cord                     Latissimus Dorsi               Musculocutaneous  C(4),5,6,7  Lateral Cord                     Biceps and Brachialis                  Coracobrachialis               Axillary  C5,6  Posterior Cord                     Anterior Deltoid                  Middle Deltoid                  Posterior Deltoid                  Teres Minor               Anterior Interosseous of     the Median Nerve  C(5),6,7,8,T1  Lateral and Medial     Cords                     Pronatus Quadratus 5               Flexor Digitorum Profundus 1,2 3   3            Flexor Pollicis Longus 3   3         Median Nerve  C(5),6,7,8,T1  Lateral and Medial Cords                     Pronator Teres                  Flexor Carpi Radialis 4   5            Flexor Digitorum Superficialis 3   3+            Palmaris Longus                  Flexor Pollicis Brevis (Superficial Head)     3             Abductor Pollicis Brevis 3   9+            Opponens Pollicis 3   3            Lumbricales 1,2 3   3         Radial Nerve  C5,6,7,8,(T1)  Posterior Cord                     Triceps 5               Supinator 5               Brachioradialis 5               Extensor Carpi Radialis 5               Extensor Carpi Ulnaris 5               Extensor Digitorum Communis 4   4            Extensor Digit Quinti 4   4            Extensor Digiti Proprius 3   3+            Extensor Pollicis Longus 3   3            Extensor Pollicis Brevis 3   0+            Abductor Pollicis Longus 4            Ulnar Nerve  C(7),8,T1  Medial Cord                     Flexor Carpi Ulnaris 4   5            Flexor Digitorum Profundus 3,4 3   4            Abductor Digiti Quinti 2   2            Adductor Pollicis 4   6+            Opponens Digiti Quinti 3   3            Dorsal Interosseous 1 3   3            Dorsal Interosseous 2 3   3            Dorsal Interosseous 3 3   3            Dorsal Interosseous 4 3   3            Volar Interosseous 1 3   3            Volar Interosseous 2 3   3            Volar Interosseous 3 3  3             Lumbricales 3,4 3               Flexor Pollicis Brevis (deep head) 3                Key  5                    Normal           Full range against gravity & maximum resistance  4                    Good                                   Full range against gravity &  resistance  3                    Fair                                      Full range against gravity  2                    Poor                                     Full range gravity eliminated  1                    Trace                                   Perceptible contraction, no movement of part  0                    Zero                                     No contraction              Pain Level (0-10 scale) post treatment: 0/10      ASSESSMENT/Changes in Function: continues to have increased difficulty with FM tasks secondary to lack of intrinsic strength.      Patient will continue to benefit from skilled OT services to address ROM deficits, address strength deficits and analyze and cue movement patterns to attain remaining goals.      [x]  See Plan of Care  []  See progress note/recertification  []  See Discharge Summary      Progress towards goals / Updated goals:  Short Term Goals:  To be accomplished in 2  weeks:  Goal:* Patient will be compliant with initial home exercise program to take an active role in their rehabilitation process. Status at Eval:   Patient was provided with a basic home exercise program including median nerve glides, brachial plexus glides, and tendon glides for strengthening and improving hand coordination. Status as last note/recert: Goal met      Goal:* Patient will demonstrate a good understanding of their condition and strategies for self-management. Status at Eval:  Patient received an initial evaluation today followed by education as to diagnosis, precautions and treatment plan. Status as last note/recert: Goal met      Long Term Goals: To be accomplished in 4 weeks:                        Goal:* Pt will have 40 pounds of  in the right hand to allow for functional grasp for all ADL activities including dressing, bathing and self care. At Eval: 25  Status as last note/recert:31          Goal:* Patient will have improved right lateral pinch strength of  6 pounds in order to perform fine motor tasks such as turning pages, turning ignition and open containers. .  At Eval: 3  Status as last note/recert: 3          Goal:* Patient will have improved right tip pinch strength of 4 pounds in order to perform fine motor tasks such as zippiing up zippers or opening containers. At Eval: 2  Status as last note/recert: 2          Goal:* Patient will have improved right dorothy pinch strength of 3 pounds in order to perform fine motor tasks such as writing. At Eval: 1  Status as last note/recert: 3          Goal:* Patient will show a 10 point improvement on FOTO functional status measure to improve overall functional performance.   Status at Eval: 46  Status as last note/recert:    PLAN  []  Upgrade activities as tolerated     []  Continue plan of care  []  Update interventions per flow sheet       []  Discharge due to:_  []  Other:_      Celia Azevedo OT 10/24/2017  2:45 PM    Future Appointments  Date Time Provider Brenda Randle   10/26/2017 1:00 PM Patricia Holcomb Rehan, OT [facility]   10/31/2017 1:00 PM Viva Dill, OT [facility]   11/2/2017 1:00 PM Viva Dill, OT [facility]   11/6/2017 2:00 PM Aurelia Morrison [facility]   11/9/2017 12:00 PM Viva Dill, OT [facility]

## 2017-10-26 ENCOUNTER — HOSPITAL ENCOUNTER (OUTPATIENT)
Dept: PHYSICAL THERAPY | Age: 57
Discharge: HOME | End: 2017-10-26
Payer: COMMERCIAL

## 2017-10-26 PROCEDURE — 97110 THERAPEUTIC EXERCISES: CPT

## 2017-10-26 NOTE — PROGRESS NOTES
[facility]   [address]  [place], [address]  [phone] fax    Occupational Therapy Physician Update  [] Progress Note  [] Discharge Summary  Patient name: Tracey Gee Start of Care: 10/2/17   Referral source: Jatinder Thomas NP : [id number]   Medical/Treatment Diagnosis: Right hand weakness [R29.898] Onset Date:2017, surgery 17     Prior Hospitalization: see medical history Provider#: [id number]   Medications: Verified on Patient Summary List    Comorbidities: HTN, cervical stenosis, diabetes,    Prior Level of Function: Independent without limitations in ADL and IADL activities.   Visits from Start of Care: 8    Missed Visits: 0    Status at Evaluation/Last Progress Note: Strength:   Measurements: Taken with Mandeep Dynamometer, in Lbs   Level 2 10/2/17 10/17/17 Date Date Date Date Date   Right 25   31                  Left 48   56                  Deficit 51%                     Change    R=+6, L=+3                         Pinch Measurements: Taken with Pinch Gauge, in Lbs   (hand) 10/2/17 10/17/17 Date Date Date Date   Lateral                      Right 3 3                 Left  11 15                 Deficit                     Change      L+4               Pad                     Right 2  2                Left 6  9                Deficit                     Change      L +3               Rickey                     Right 1  3                Left 8  9                Deficit                     Change      L + 1                   Sensation:   Improved since surgery, only occasion warm sensation      Upper Extremity Manual Muscle Test  Nerve  Nerve root  Brachial Plexus Origin Muscle 10/2/17 10/20/17 Date Date   Long Thoracic  C5,6,7,(8)  Pre-trunk                     Serratus Anterior               Lower Subscapular  C(4),5,6,(7)  Posterior Cord                     Teres Major               Lateral Pectoral  C5,6,7  Lateral Cord                     Pectoralis Major-Clavicular Portion                  Pectoralis Major-Sternal Portion               Thoracodorsal  C(5),6,7,8  Posterior Cord                     Latissimus Dorsi               Musculocutaneous  C(4),5,6,7  Lateral Cord                     Biceps and Brachialis                  Coracobrachialis               Axillary  C5,6  Posterior Cord                     Anterior Deltoid                  Middle Deltoid                  Posterior Deltoid                  Teres Minor               Anterior Interosseous of     the Median Nerve  C(5),6,7,8,T1  Lateral and Medial     Cords                     Pronatus Quadratus 5               Flexor Digitorum Profundus 1,2 3   3            Flexor Pollicis Longus 3   3         Median Nerve  C(5),6,7,8,T1  Lateral and Medial Cords                     Pronator Teres                  Flexor Carpi Radialis 4   5            Flexor Digitorum Superficialis 3   3+            Palmaris Longus                  Flexor Pollicis Brevis (Superficial Head)     3             Abductor Pollicis Brevis 3   0+            Opponens Pollicis 3   3            Lumbricales 1,2 3   3         Radial Nerve  C5,6,7,8,(T1)  Posterior Cord                     Triceps 5               Supinator 5               Brachioradialis 5               Extensor Carpi Radialis 5               Extensor Carpi Ulnaris 5               Extensor Digitorum Communis 4   4            Extensor Digit Quinti 4   4            Extensor Digiti Proprius 3   3+            Extensor Pollicis Longus 3   3            Extensor Pollicis Brevis 3   1+            Abductor Pollicis Longus 4            Ulnar Nerve  C(7),8,T1  Medial Cord                     Flexor Carpi Ulnaris 4   5            Flexor Digitorum Profundus 3,4 3   4            Abductor Digiti Quinti 2   2            Adductor Pollicis 4   8+            Opponens Digiti Quinti 3   3            Dorsal Interosseous 1 3   3            Dorsal Interosseous 2 3   3          Dorsal Interosseous 3 3   3            Dorsal Interosseous 4 3   3            Volar Interosseous 1 3   3            Volar Interosseous 2 3   3            Volar Interosseous 3 3  3             Lumbricales 3,4 3               Flexor Pollicis Brevis (deep head) 3                Key  5                    Normal           Full range against gravity & maximum resistance  4                    Good                                   Full range against gravity &  resistance  3                    Fair                                      Full range against gravity  2                    Poor                                     Full range gravity eliminated  1                    Trace                                   Perceptible contraction, no movement of part  0                    Zero                                     No contraction    Progress towards Goals: Short Term Goals: To be accomplished in 2  weeks:  Goal:* Patient will be compliant with initial home exercise program to take an active role in their rehabilitation process. Status at Eval:   Patient was provided with a basic home exercise program including median nerve glides, brachial plexus glides, and tendon glides for strengthening and improving hand coordination. Status as last note/recert: Goal met      Goal:* Patient will demonstrate a good understanding of their condition and strategies for self-management. Status at Eval:  Patient received an initial evaluation today followed by education as to diagnosis, precautions and treatment plan. Status as last note/recert: Goal met      Long Term Goals: To be accomplished in 4 weeks:                        Goal:* Pt will have 40 pounds of  in the right hand to allow for functional grasp for all ADL activities including dressing, bathing and self care.   At Eval: 25  Status as last note/recert:31      Goal:* Patient will have improved right lateral pinch strength of  6 pounds in order to perform fine motor tasks such as turning pages, turning ignition and open containers. .  At Eval: 3  Status as last note/recert: 3       Goal:* Patient will have improved right tip pinch strength of 4 pounds in order to perform fine motor tasks such as zippiing up zippers or opening containers. At Eval: 2  Status as last note/recert: 2      Goal:* Patient will have improved right dorothy pinch strength of 3 pounds in order to perform fine motor tasks such as writing. At Eval: 1  Status as last note/recert: 3      Goal:* Patient will show a 10 point improvement on FOTO functional status measure to improve overall functional performance. Status at Eval: 46  Status as last note/recert:50    Will continue with previous goals: to be achieved in 4 weeks:  Goal:* Pt will have 40 pounds of  in the right hand to allow for functional grasp for all ADL activities including dressing, bathing and self care. At Eval: 25  Status as last note/recert:31      Goal:* Patient will have improved right lateral pinch strength of  6 pounds in order to perform fine motor tasks such as turning pages, turning ignition and open containers. .  At Eval: 3  Status as last note/recert: 3       Goal:* Patient will have improved right tip pinch strength of 4 pounds in order to perform fine motor tasks such as zippiing up zippers or opening containers. At Eval: 2  Status as last note/recert: 2      Goal:* Patient will have improved right dorothy pinch strength of 3 pounds in order to perform fine motor tasks such as writing. At Eval: 1  Status as last note/recert: 3      Goal:* Patient will show a 10 point improvement on FOTO functional status measure to improve overall functional performance.   Status at Eval: 46  Status as last note/recert:50    ASSESSMENT/RECOMMENDATIONS:  []Continue therapy per initial plan/protocol at a frequency of 2 x per week for 4 weeks  []Continue therapy with the following recommended changes:_____________________ _____________________________________________________________________  []Discontinue therapy progressing towards or have reached established goals  []Discontinue therapy due to lack of appreciable progress towards goals  []Discontinue therapy due to lack of attendance or compliance  []Await Physician's recommendations/decisions regarding therapy  []Other:________________________________________________________________    Thank you for this referral. Lili Parkinson OT 10/26/2017 2:52 PM  NOTE TO PHYSICIAN:  PLEASE COMPLETE THE ORDERS BELOW AND   FAX TO [facility]: [phone]  If you are unable to process this request in 24 hours please contact our office: [phone]    ? I have read the above report and request that my patient continue as recommended. ? I have read the above report and request that my patient continue therapy with the following changes/special instructions:___________________________________________________________  ? I have read the above report and request that my patient be discharged from therapy.     Physicians signature: ______________________________Date: ______Time:______

## 2017-10-26 NOTE — PROGRESS NOTES
OT DAILY TREATMENT NOTE  3-16    Patient Name: Desirae Ballesteros  Date:10/26/2017  : 1960  [x]  Patient  Verified  Payor: Kimmy Lugoin / Plan: [employer] HMO / Product Type: HMO /    In time:1245  Out time:1325  Total Treatment Time (min): 40  Visit #: 8* of 8    Treatment Area: Right hand weakness [R29.898]    SUBJECTIVE  Pain Level (0-10 scale): 0/10  Any medication changes, allergies to medications, adverse drug reactions, diagnosis change, or new procedure performed?: [x] No    [] Yes (see summary sheet for update)  Subjective functional status/changes:   [x] No changes reported      OBJECTIVE    40 min Therapeutic Exercise:  [x] See flow sheet :   Rationale: increase ROM, increase strength and improve coordination to improve the patients ability to use the right UE with gripping and pinching activities.       With   [] TE   [] TA   [] neuro   [] other: Patient Education: [x] Review HEP    [] Progressed/Changed HEP based on:   [] positioning   [] body mechanics   [] transfers   [] heat/ice application   [] Splint wear/care   [] Sensory re-education   [] scar management      [] other:             Other Objective/Functional Measures: Strength:   Measurements: Taken with Mandeep Dynamometer, in Lbs   Level 2 10/2/17 10/17/17 Date Date Date Date Date   Right 25   31                  Left 48   56                  Deficit 51%                     Change    R=+6, L=+3                         Pinch Measurements: Taken with Pinch Gauge, in Lbs   (hand) 10/2/17 10/17/17 Date Date Date Date   Lateral                      Right 3 3                 Left  11 15                 Deficit                     Change      L+4               Pad                     Right 2  2                Left 6  9                Deficit                     Change      L +3               Rickey                     Right 1  3                Left 8  9                Deficit                     Change      L + 1                 Sensation:   Improved since surgery, only occasion warm sensation      Upper Extremity Manual Muscle Test  Nerve  Nerve root  Brachial Plexus Origin Muscle 10/2/17 10/20/17 Date Date   Long Thoracic  C5,6,7,(8)  Pre-trunk                     Serratus Anterior               Lower Subscapular  C(4),5,6,(7)  Posterior Cord                     Teres Major               Lateral Pectoral  C5,6,7  Lateral Cord                     Pectoralis Major-Clavicular Portion                  Pectoralis Major-Sternal Portion               Thoracodorsal  C(5),6,7,8  Posterior Cord                     Latissimus Dorsi               Musculocutaneous  C(4),5,6,7  Lateral Cord                     Biceps and Brachialis                  Coracobrachialis               Axillary  C5,6  Posterior Cord                     Anterior Deltoid                  Middle Deltoid                  Posterior Deltoid                  Teres Minor               Anterior Interosseous of     the Median Nerve  C(5),6,7,8,T1  Lateral and Medial     Cords                     Pronatus Quadratus 5               Flexor Digitorum Profundus 1,2 3   3            Flexor Pollicis Longus 3   3         Median Nerve  C(5),6,7,8,T1  Lateral and Medial Cords                     Pronator Teres                  Flexor Carpi Radialis 4   5            Flexor Digitorum Superficialis 3   3+            Palmaris Longus                  Flexor Pollicis Brevis (Superficial Head)     3             Abductor Pollicis Brevis 3   5+            Opponens Pollicis 3   3            Lumbricales 1,2 3   3         Radial Nerve  C5,6,7,8,(T1)  Posterior Cord                     Triceps 5               Supinator 5               Brachioradialis 5               Extensor Carpi Radialis 5               Extensor Carpi Ulnaris 5               Extensor Digitorum Communis 4   4            Extensor Digit Quinti 4   4            Extensor Digiti Proprius 3   3+            Extensor Pollicis Longus 3   3            Extensor Pollicis Brevis 3   9+            Abductor Pollicis Longus 4            Ulnar Nerve  C(7),8,T1  Medial Cord                     Flexor Carpi Ulnaris 4   5            Flexor Digitorum Profundus 3,4 3   4            Abductor Digiti Quinti 2   2            Adductor Pollicis 4   3+            Opponens Digiti Quinti 3   3            Dorsal Interosseous 1 3   3            Dorsal Interosseous 2 3   3            Dorsal Interosseous 3 3   3            Dorsal Interosseous 4 3   3            Volar Interosseous 1 3   3            Volar Interosseous 2 3   3            Volar Interosseous 3 3  3             Lumbricales 3,4 3               Flexor Pollicis Brevis (deep head) 3                Key  5                    Normal           Full range against gravity & maximum resistance  4                    Good                                   Full range against gravity &  resistance  3                    Fair                                      Full range against gravity  2                    Poor                                     Full range gravity eliminated  1                    Trace                                   Perceptible contraction, no movement of part  0                    Zero                                     No contraction              Pain Level (0-10 scale) post treatment: 0/10      ASSESSMENT/Changes in Function: fatigues with prehension activities.     Patient will continue to benefit from skilled OT services to address ROM deficits, address strength deficits and analyze and cue movement patterns to attain remaining goals.      []  See Plan of Care  [x]  See progress note/recertification  []  See Discharge Summary      Progress towards goals / Updated goals:  Short Term Goals: To be accomplished in 2  weeks:  Goal:* Patient will be compliant with initial home exercise program to take an active role in their rehabilitation process.   Status at [facility]:   Patient was provided with a basic home exercise program including median nerve glides, brachial plexus glides, and tendon glides for strengthening and improving hand coordination. Status as last note/recert: Goal met      Goal:* Patient will demonstrate a good understanding of their condition and strategies for self-management. Status at Eval:  Patient received an initial evaluation today followed by education as to diagnosis, precautions and treatment plan. Status as last note/recert: Goal met      Long Term Goals: To be accomplished in 4 weeks:                        Goal:* Pt will have 40 pounds of  in the right hand to allow for functional grasp for all ADL activities including dressing, bathing and self care. At Eval: 25  Status as last note/recert:31        Goal:* Patient will have improved right lateral pinch strength of  6 pounds in order to perform fine motor tasks such as turning pages, turning ignition and open containers. .  At Eval: 3  Status as last note/recert: 3       Goal:* Patient will have improved right tip pinch strength of 4 pounds in order to perform fine motor tasks such as zippiing up zippers or opening containers. At Eval: 2  Status as last note/recert: 2        Goal:* Patient will have improved right dorothy pinch strength of 3 pounds in order to perform fine motor tasks such as writing. At Eval: 1  Status as last note/recert: 3       Goal:* Patient will show a 10 point improvement on FOTO functional status measure to improve overall functional performance.   Status at Eval: 46  Status as last note/recert:    PLAN  [x]  Upgrade activities as tolerated     [x]  Continue plan of care  []  Update interventions per flow sheet       []  Discharge due to:_  []  Other:_      Dunia Genao OT 10/26/2017  1:01 PM    Future Appointments  Date Time Provider Brenda Randle   10/31/2017 1:00 PM Dunia Genao OT [facility]   11/2/2017 1:00 PM Dunia Genao OT [facility]   11/6/2017 2:00 PM Kristal Sandoval [facility]   [facility]9/2017 12:00 [facility]

## 2017-10-30 ENCOUNTER — TELEPHONE (OUTPATIENT)
Dept: ORTHOPEDIC SURGERY | Age: 57
End: 2017-10-30

## 2017-10-30 NOTE — TELEPHONE ENCOUNTER
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 Patient can be reached at [phone].

## 2017-10-30 NOTE — TELEPHONE ENCOUNTER
Why is she unable to use her right hand? Per Dr. Sha Suarez note at the last visit, She is six weeks out from her cervical fusion. She is doing dramatically better with improvement in her neck pain and resolution of much of her radicular arm pain. She has normal strength and sensation on exam. She was released back to work. We can not give this note as the last exam on file has normal strentgh and she was released back to work. She can address this at the follow up on 11/10.

## 2017-10-31 ENCOUNTER — APPOINTMENT (OUTPATIENT)
Dept: PHYSICAL THERAPY | Age: 57
End: 2017-10-31
Payer: COMMERCIAL

## 2017-11-02 ENCOUNTER — HOSPITAL ENCOUNTER (OUTPATIENT)
Dept: PHYSICAL THERAPY | Age: 57
Discharge: HOME | End: 2017-11-02
Payer: COMMERCIAL

## 2017-11-02 PROCEDURE — 97110 THERAPEUTIC EXERCISES: CPT

## 2017-11-02 NOTE — TELEPHONE ENCOUNTER
Spoke with patient, stated that her right hand has been giving her problems since the [address]. Stated her strength has not returned, and she thought Dr. Iraida Sherman knew this since he sent her to PT for the neck issue. I asked patient had she mentioned this to him at the last visit, and she stated she thought he was aware. Explained to patient that she has to inform the Physician at the visit what issues she is having to get to the problem. Patient does have a fu on 11/10 with Dr. Iraida Sherman. Informed that letter could not be written at this time due to last office note not stating an issue. Patient would like reconsideration. Please advise.

## 2017-11-02 NOTE — TELEPHONE ENCOUNTER
POST OP PATIENT RETURNED [facility]. PATIENT IS REQUESTING A CALL BACK AS SOON AS POSSIBLE . PATIENT TEL. [phone].

## 2017-11-02 NOTE — PROGRESS NOTES
OT DAILY TREATMENT NOTE  3-16    Patient Name: Cyrus Diaz  Date:2017  : 1960  [x]  Patient  Verified  Payor: Lacy Ramirez / Plan: [employer] HMO / Product Type: HMO /    In time:1305  Out time:1335  Total Treatment Time (min): 30  Visit #: 1 of 8    Treatment Area: Right hand weakness [R29.898]    SUBJECTIVE  Pain Level (0-10 scale): 0/10  Any medication changes, allergies to medications, adverse drug reactions, diagnosis change, or new procedure performed?: [x] No    [] Yes (see summary sheet for update)  Subjective functional status/changes:   [x] No changes reported      OBJECTIVE    30 min Therapeutic Exercise:  [] See flow sheet :   Rationale: increase ROM, increase strength and improve coordination to improve the patients ability to use the right UE with functional ADL and IADL tasks without limitations.     With   [] TE   [] TA   [] neuro   [] other: Patient Education: [x] Review HEP    [] Progressed/Changed HEP based on:   [] positioning   [] body mechanics   [] transfers   [] heat/ice application   [] Splint wear/care   [] Sensory re-education   [] scar management      [] other:             Other Objective/Functional Measures: Strength:   Measurements: Taken with Mandeep Dynamometer, in Lbs   Level 2 10/2/17 10/17/17 11/2/17 Date Date Date Date   Right 25   31 29 -new equipment used                Left 48   56                  Deficit 51%                     Change    R=+6, L=+3                         Pinch Measurements: Taken with Pinch Gauge, in Lbs   (hand) 10/2/17 10/17/17 11/2/17 Date Date Date   Lateral        New equipment used              Right 3 3   2              Left  11 15   10              Deficit                     Change      L+4               Pad                     Right 2  2  2              Left 6  9  7              Deficit                     Change      L +3               Rickey                     Right 1  3   2             Left 8  9  6              Deficit                     Change      L + 1                   Sensation:   Improved since surgery, only occasion warm sensation      Upper Extremity Manual Muscle Test  Nerve  Nerve root  Brachial Plexus Origin Muscle 10/2/17 10/20/17 Date Date   Long Thoracic  C5,6,7,(8)  Pre-trunk                     Serratus Anterior               Lower Subscapular  C(4),5,6,(7)  Posterior Cord                     Teres Major               Lateral Pectoral  C5,6,7  Lateral Cord                     Pectoralis Major-Clavicular Portion                  Pectoralis Major-Sternal Portion               Thoracodorsal  C(5),6,7,8  Posterior Cord                     Latissimus Dorsi               Musculocutaneous  C(4),5,6,7  Lateral Cord                     Biceps and Brachialis                  Coracobrachialis               Axillary  C5,6  Posterior Cord                     Anterior Deltoid                  Middle Deltoid                  Posterior Deltoid                  Teres Minor               Anterior Interosseous of     the Median Nerve  C(5),6,7,8,T1  Lateral and Medial     Cords                     Pronatus Quadratus 5               Flexor Digitorum Profundus 1,2 3   3   +3         Flexor Pollicis Longus 3   3   3      Median Nerve  C(5),6,7,8,T1  Lateral and Medial Cords                   Pronator Teres                  Flexor Carpi Radialis 4   5   5         Flexor Digitorum Superficialis 3   3+   -4         Palmaris Longus                  Flexor Pollicis Brevis (Superficial Head)     3    3         Abductor Pollicis Brevis 3   8+   -4         Opponens Pollicis 3   3   3         Lumbricales 1,2 3   3   3      Radial Nerve  C5,6,7,8,(T1)  Posterior Cord                     Triceps 5               Supinator 5               Brachioradialis 5               Extensor Carpi Radialis 5               Extensor Carpi Ulnaris 5               Extensor Digitorum Communis 4   4   4         Extensor Digit Quinti 4   4   4         Extensor Digiti Proprius 3   3+   3+         Extensor Pollicis Longus 3   3   3         Extensor Pollicis Brevis 3   1+   3+         Abductor Pollicis Longus 4      4      Ulnar Nerve  C(7),8,T1  Medial Cord                     Flexor Carpi Ulnaris 4   5            Flexor Digitorum Profundus 3,4 3   4   4         Abductor Digiti Quinti 4 4+  4+          Adductor Pollicis 2   2   2         Opponens Digiti Quinti 3   3   3         Dorsal Interosseous 1 3   3   3         Dorsal Interosseous 2 3   3  3          Dorsal Interosseous 3 3   3   3         Dorsal Interosseous 4 3   3   3         Volar Interosseous 1 3   3   3         Volar Interosseous 2 3   3   3         Volar Interosseous 3 3  3    3         Lumbricales 3,4 3      3         Flexor Pollicis Brevis (deep head) 3      3          Key  5                    Normal           Full range against gravity & maximum resistance  4                    Good                                   Full range against gravity &  resistance  3                    Fair                                      Full range against gravity  2                    Poor                                     Full range gravity eliminated  1                    Trace                                   Perceptible contraction, no movement of part  0                    Zero                                     No contraction          Pain Level (0-10 scale) post treatment: 0/10    ASSESSMENT/Changes in Function: improving functional strength in the intrinsic muscle on the right hand    Patient will continue to benefit from skilled OT services to modify and progress therapeutic interventions, address ROM deficits and address strength deficits to attain remaining goals.      [x]  See Plan of Care  []  See progress note/recertification  []  See Discharge Summary         Progress towards goals / Updated goals:  Goal:* Pt will have 40 pounds of  in the right hand to allow for functional grasp for all ADL activities including dressing, bathing and self care. At Eval: 25  Status as last note/recert:31      Goal:* Patient will have improved right lateral pinch strength of  6 pounds in order to perform fine motor tasks such as turning pages, turning ignition and open containers. .  At Eval: 3  Status as last note/recert: 3       Goal:* Patient will have improved right tip pinch strength of 4 pounds in order to perform fine motor tasks such as zippiing up zippers or opening containers. At Eval: 2  Status as last note/recert: 2      Goal:* Patient will have improved right dorothy pinch strength of 3 pounds in order to perform fine motor tasks such as writing. At Eval: 1  Status as last note/recert: 3      Goal:* Patient will show a 10 point improvement on FOTO functional status measure to improve overall functional performance.   Status at Eval: 46  Status as last note/recert:50    PLAN  []  Upgrade activities as tolerated     [x]  Continue plan of care  []  Update interventions per flow sheet       []  Discharge due to:_  []  Other:_      Lesli Harris OT 11/2/2017  12:56 PM    Future Appointments  Date Time Provider Brenda Randle   11/2/2017 1:00 PM Lesli Harris OT [facility]   11/6/2017 2:00 PM Bharat Lucero [facility]   11/[facility]/2017 12:00 [facility]   11/10/2017 9:15 AM Yisel Pinzon  [address]    11/14/2017 12:00 [facility]

## 2017-11-02 NOTE — TELEPHONE ENCOUNTER
She will have to address this at her follow up. Per his note, She is six weeks out from her cervical fusion. She is doing dramatically better with improvement in her neck pain and resolution of much of her radicular arm pain.   She has normal strength and sensation on exam.

## 2017-11-02 NOTE — TELEPHONE ENCOUNTER
Spoke with patient, informed that this had to be addressed at next visit, due to Dr. Jerome Hernandez last note. Patient stated understanding.

## 2017-11-06 ENCOUNTER — HOSPITAL ENCOUNTER (OUTPATIENT)
Dept: PHYSICAL THERAPY | Age: 57
Discharge: HOME | End: 2017-11-06
Payer: COMMERCIAL

## 2017-11-06 PROCEDURE — 97110 THERAPEUTIC EXERCISES: CPT

## 2017-11-06 NOTE — PROGRESS NOTES
OT DAILY TREATMENT NOTE - non [facility] 3-16    Patient Name: Jose Branham  Date:2017  : 1960  [x]  Patient  Verified  Payor: Carolina Pawel / Plan: [employer] HMO / Product Type: HMO /    In time:200  Out time:235  Total Treatment Time (min): 35  Visit #: 2 of 8    Treatment Area: Right hand weakness [R29.898]    SUBJECTIVE  Pain Level (0-10 scale): 0/10  Any medication changes, allergies to medications, adverse drug reactions, diagnosis change, or new procedure performed?: [x] No    [] Yes (see summary sheet for update)  Subjective functional status/changes:   [] No changes reported  I have some numbness near my ring and pinky finger    OBJECTIVE  30 min Therapeutic Exercise:  [] See flow sheet :   Rationale: increase ROM and increase strength to improve the patients ability to , pinch    Lg Yellow Theraband: 3 Way: 20x each  \"C\" Exercises  3# -3 way bicep Curls x10        With   [] TE   [] TA   [] neuro   [] other: Patient Education: [x] Review HEP    [] Progressed/Changed HEP based on:   [] positioning   [] body mechanics   [] transfers   [] heat/ice application   [] Splint wear/care   [] Sensory re-education   [] scar management      [] other:             Other Objective/Functional Measures: Strength:   Measurements: Taken with Mandeep Dynamometer, in Lbs   Level 2 10/2/17 10/17/17 11/2/17 Date Date Date Date   Right 25   31 29 -new equipment used                Left 53   56                  Deficit 51%                     Change    R=+6, L=+3                         Pinch Measurements: Taken with Pinch Gauge, in Lbs   (hand) 10/2/17 10/17/17 11/2/17 Date Date Date   Lateral        New equipment used              Right 3 3   2              Left  11 15   10              Deficit                     Change      L+4               Pad                     Right 2  2  2              Left 6  9  7              Deficit                     Change      L +3               Rickey [address]         Right 1  3   2             Left 8  9  6              Deficit                     Change      L + 1                   Sensation:   Improved since surgery, only occasion warm sensation      Upper Extremity Manual Muscle Test  Nerve  Nerve root  Brachial Plexus Origin Muscle 10/2/17 10/20/17 Date Date   Long Thoracic  C5,6,7,(8)  Pre-trunk                     Serratus Anterior               Lower Subscapular  C(4),5,6,(7)  Posterior Cord                     Teres Major               Lateral Pectoral  C5,6,7  Lateral Cord                     Pectoralis Major-Clavicular Portion                  Pectoralis Major-Sternal Portion               Thoracodorsal  C(5),6,7,8  Posterior Cord                     Latissimus Dorsi               Musculocutaneous  C(4),5,6,7  Lateral Cord                     Biceps and Brachialis                  Coracobrachialis               Axillary  C5,6  Posterior Cord                     Anterior Deltoid                  Middle Deltoid                  Posterior Deltoid                  Teres Minor               Anterior Interosseous of     the Median Nerve  C(5),6,7,8,T1  Lateral and Medial     Cords                     Pronatus Quadratus 5               Flexor Digitorum Profundus 1,2 3   3   +3         Flexor Pollicis Longus 3   3   3      Median Nerve  C(5),6,7,8,T1  Lateral and Medial Cords                    Pronator Teres                  Flexor Carpi Radialis 4   5   5         Flexor Digitorum Superficialis 3   3+   -4         Palmaris Longus                  Flexor Pollicis Brevis (Superficial Head)     3    3         Abductor Pollicis Brevis 3   0+   -4         Opponens Pollicis 3   3   3         Lumbricales 1,2 3   3   3      Radial Nerve  C5,6,7,8,(T1)  Posterior Cord                     Triceps 5               Supinator 5               Brachioradialis 5               Extensor Sint [place]thonis 5               Extensor [place] 5               Extensor Digitorum Communis 4   4   4         Extensor Digit Quinti 4   4   4         Extensor Digiti Proprius 3   3+   3+         Extensor Pollicis Longus 3   3   3         Extensor Pollicis Brevis 3   2+   1+         Abductor Pollicis Longus 4      4      Ulnar Nerve  C(7),8,T1  Medial Cord                     Flexor Carpi Ulnaris 4   5            Flexor Digitorum Profundus 3,4 3   4   4         Abductor Digiti Quinti 4 4+  4+          Adductor Pollicis 2   2   2         Opponens Digiti Quinti 3   3   3         Dorsal Interosseous 1 3   3   3         Dorsal Interosseous 2 3   3  3          Dorsal Interosseous 3 3   3   3         Dorsal Interosseous 4 3   3   3         Volar Interosseous 1 3   3   3         Volar Interosseous 2 3   3   3         Volar Interosseous 3 3  3    3         Lumbricales 3,4 3      3         Flexor Pollicis Brevis (deep head)              Pain Level (0-10 scale) post treatment: 0/10    ASSESSMENT/Changes in Function: Strength improving, difficulty with C exercises     Patient will continue to benefit from skilled OT services to modify and progress therapeutic interventions, address ROM deficits, address strength deficits and instruct in home and community integration to attain remaining goals. []  See Plan of Care  []  See progress note/recertification  []  See Discharge Summary         Progress towards goals / Updated goals:  Goal:* Pt will have 40 pounds of  in the right hand to allow for functional grasp for all ADL activities including dressing, bathing and self care. At Eval: 25  Status as last note/recert:31      Goal:* Patient will have improved right lateral pinch strength of  6 pounds in order to perform fine motor tasks such as turning pages, turning ignition and open containers. .  At Eval: 3  Status as last note/recert: 3       Goal:* Patient will have improved right tip pinch strength of 4 pounds in order to perform fine motor tasks such as zippiing up zippers or opening containers.   At Eval: 2  Status as last note/recert: 2      Goal:* Patient will have improved right dorothy pinch strength of 3 pounds in order to perform fine motor tasks such as writing. At Eval: 1  Status as last note/recert: 3      Goal:* Patient will show a 10 point improvement on FOTO functional status measure to improve overall functional performance.   Status at Eval: 46  Status as last note/recert:50    PLAN  []  Upgrade activities as tolerated     [x]  Continue plan of care  []  Update interventions per flow sheet       []  Discharge due to:_  []  Other:_      LATRICIA Brody/L 11/6/2017  5:20 PM    Future Appointments  Date Time Provider Brenda Callei   11/9/2017 12:00 PM Gib Kehr, OT [facility]   11/10/2017 9:15 AM Jose Zaman  [address]   11/14/2017 12:00 PM Gib Kehr, OT [facility]

## 2017-11-14 ENCOUNTER — HOSPITAL ENCOUNTER (OUTPATIENT)
Dept: PHYSICAL THERAPY | Age: 57
Discharge: HOME | End: 2017-11-14
Payer: COMMERCIAL

## 2017-11-14 PROCEDURE — 97110 THERAPEUTIC EXERCISES: CPT

## 2017-11-14 NOTE — PROGRESS NOTES
OT DAILY TREATMENT NOTE  3-16    Patient Name: Khushboo Zavala  Date:2017  : 1960  [x]  Patient  Verified  Payor: Rishabh Purdy / Plan: [employer] / Product Type: HMO /    In time:1150  Out time:1220  Total Treatment Time (min): 30  Visit #: 3 of 8    Treatment Area: Right hand weakness [R29.898]    SUBJECTIVE  Pain Level (0-10 scale): 0/10  Any medication changes, allergies to medications, adverse drug reactions, diagnosis change, or new procedure performed?: [x] No    [] Yes (see summary sheet for update)  Subjective functional status/changes:   [x] No changes reported  \"I can turn on the light switch some times and sometimes it seems harder. \"    OBJECTIVE    30 min Therapeutic Exercise:  [x] See flow sheet :   Rationale: increase strength to improve the patients ability to return to functional use of the right UE with ADL and IADL tasks    With   [] TE   [] TA   [] neuro   [] other: Patient Education: [x] Review HEP    [] Progressed/Changed HEP based on:   [] positioning   [] body mechanics   [] transfers   [] heat/ice application   [] Splint wear/care   [] Sensory re-education   [] scar management      [] other:             Other Objective/Functional Measures: Strength:   Measurements: Taken with Mandeep Dynamometer, in Lbs   Level 2 10/2/17 10/17/17 11/2/17 Date Date Date Date   Right 25   31 29 -new equipment used                Left 48   56                  Deficit 51%                     Change    R=+6, L=+3                         Pinch Measurements: Taken with Pinch Gauge, in Lbs   (hand) 10/2/17 10/17/17 11/2/17 Date Date Date   Lateral        New equipment used              Right 3 3   2              Left  11 15   10              Deficit                     Change      L+4               Pad                     Right 2  2  2              Left 6  9  7              Deficit                     Change      L +3               Rickey                     Right 1  3   2             Left 8  9  6              Deficit                     Change      L + 1                   Sensation:   Improved since surgery, only occasion warm sensation      Upper Extremity Manual Muscle Test  Nerve  Nerve root  Brachial Plexus Origin Muscle 10/2/17 10/20/17 Date Date   Long Thoracic  C5,6,7,(8)  Pre-trunk                     Serratus Anterior               Lower Subscapular  C(4),5,6,(7)  Posterior Cord                     Teres Major               Lateral Pectoral  C5,6,7  Lateral Cord                     Pectoralis Major-Clavicular Portion                  Pectoralis Major-Sternal Portion               Thoracodorsal  C(5),6,7,8  Posterior Cord                     Latissimus Dorsi               Musculocutaneous  C(4),5,6,7  Lateral Cord                     Biceps and Brachialis                  Coracobrachialis               Axillary  C5,6  Posterior Cord                     Anterior Deltoid                  Middle Deltoid                  Posterior Deltoid                  Teres Minor               Anterior Interosseous of     the Median Nerve  C(5),6,7,8,T1  Lateral and Medial     Cords                     Pronatus Quadratus 5               Flexor Digitorum Profundus 1,2 3   3   +3         Flexor Pollicis Longus 3   3   3      Median Nerve  C(5),6,7,8,T1  Lateral and Medial Cords                     Pronator Teres                  Flexor Carpi Radialis 4   5   5         Flexor Digitorum Superficialis 3   3+   -4         Palmaris Longus                  Flexor Pollicis Brevis (Superficial Head)     3    3         Abductor Pollicis Brevis 3   1+   -4         Opponens Pollicis 3   3   3         Lumbricales 1,2 3   3   3      Radial Nerve  C5,6,7,8,(T1)  Posterior Cord                     Triceps 5               Supinator 5               Brachioradialis 5               Extensor Sint Anthonis 5               Extensor [place] 5               Extensor Digitorum Communis 4 [place]   2         Extensor Digit Quinti 4   4   4         Extensor Digiti Proprius 3   3+   3+         Extensor Pollicis Longus 3   3   3         Extensor Pollicis Brevis 3   2+   0+         Abductor Pollicis Longus 4      4      Ulnar Nerve  C(7),8,T1  Medial Cord                     Flexor Carpi Ulnaris 4   5            Flexor Digitorum Profundus 3,4 3   4   4         Abductor Digiti Quinti 4 4+  4+          Adductor Pollicis 2   2   2         Opponens Digiti Quinti 3   3   3         Dorsal Interosseous 1 3   3   3         Dorsal Interosseous 2 3   3  3          Dorsal Interosseous 3 3   3   3         Dorsal Interosseous 4 3   3   3         Volar Interosseous 1 3   3   3         Volar Interosseous 2 3   3   3         Volar Interosseous 3 3  3    3         Lumbricales 3,4 3      3         Flexor Pollicis Brevis (deep head)                                       Pain Level (0-10 scale) post treatment: 0/10     ASSESSMENT/Changes in Function: prehension improve for 3 jaw dorothy and tip to tip.     Patient will continue to benefit from skilled OT services to modify and progress therapeutic interventions, address ROM deficits, address strength deficits and instruct in home and community integration to attain remaining goals.      [x]  See Plan of Care  []  See progress note/recertification  []  See Discharge Summary      Progress towards goals / Updated goals:  Goal:* Pt will have 40 pounds of  in the right hand to allow for functional grasp for all ADL activities including dressing, bathing and self care. At Eval: 25  Status as last note/recert:31      Goal:* Patient will have improved right lateral pinch strength of  6 pounds in order to perform fine motor tasks such as turning pages, turning ignition and open containers. .  At Eval: 3  Status as last note/recert: 3       Goal:* Patient will have improved right tip pinch strength of 4 pounds in order to perform fine motor tasks such as zippiing up zippers or opening containers.   At Eval: 2  Status as last note/recert: 2      Goal:* Patient will have improved right dorothy pinch strength of 3 pounds in order to perform fine motor tasks such as writing. At Eval: 1  Status as last note/recert: 3      Goal:* Patient will show a 10 point improvement on FOTO functional status measure to improve overall functional performance.   Status at Eval: 46  Status as last note/recert:50    PLAN  []  Upgrade activities as tolerated     [x]  Continue plan of care  []  Update interventions per flow sheet       []  Discharge due to:_  []  Other:_      Nona Bryant OT 11/14/2017  12:15 PM    Future Appointments  Date Time Provider Brenda Randle   11/21/2017 9:15 AM Rosalio Gaitan  [address]

## 2017-11-20 ENCOUNTER — HOSPITAL ENCOUNTER (OUTPATIENT)
Dept: PHYSICAL THERAPY | Age: 57
Discharge: HOME | End: 2017-11-20
Payer: COMMERCIAL

## 2017-11-20 PROCEDURE — 97110 THERAPEUTIC EXERCISES: CPT

## 2017-11-20 NOTE — PROGRESS NOTES
OT DAILY TREATMENT NOTE - non [facility] 3-16    Patient Name: Tracey Gee  Date:2017  : 1960  [x]  Patient  Verified  Payor: Benton Alonso / Plan: [employer] / Product Type: HMO /    In time:300  Out time:333  Total Treatment Time (min): 33  Visit #: 4 of 8    Treatment Area: Right hand weakness [R29.898]    SUBJECTIVE  Pain Level (0-10 scale): 0/10  Any medication changes, allergies to medications, adverse drug reactions, diagnosis change, or new procedure performed?: [x] No    [] Yes (see summary sheet for update)  Subjective functional status/changes:   [] No changes reported  I wear gloves when it is cold outside. My hands hurt when it gets cold.     OBJECTIVE  30 min Therapeutic Exercise:  [] See flow sheet :   Rationale: increase ROM and increase strength to improve the patients ability to , pinch    UBE: 10 Min Lv 2  Therabar: Lg Yellow 3 ways 20x each  Hammer Stretch: 20 reps  Wt Well: 1# 5x    With   [] TE   [] TA   [] neuro   [] other: Patient Education: [x] Review HEP    [] Progressed/Changed HEP based on:   [] positioning   [] body mechanics   [] transfers   [] heat/ice application   [] Splint wear/care   [] Sensory re-education   [] scar management      [] other:             Other Objective/Functional Measures:      Strength:   Measurements: Taken with Mandeep Dynamometer, in Lbs   Level 2 10/2/17 10/17/17 11/2/17 Date Date Date Date   Right 25   31 29 -new equipment used                Left 53   56                  Deficit 51%                     Change    R=+6, L=+3                         Pinch Measurements: Taken with Pinch Gauge, in Lbs   (hand) 10/2/17 10/17/17 11/2/17 Date Date Date   Lateral        New equipment used              Right 3 3   2              Left  11 15   10              Deficit                     Change      L+4               Pad                     Right 2  2  2              Left 6  9  7              Deficit                     Change      L +3               Rickey                     Right 1  3   2             Left 8  9  6              Deficit                     Change      L + 1                   Sensation:   Improved since surgery, only occasion warm sensation      Upper Extremity Manual Muscle Test  Nerve  Nerve root  Brachial Plexus Origin Muscle 10/2/17 10/20/17 Date Date   Long Thoracic  C5,6,7,(8)  Pre-trunk                     Serratus Anterior               Lower Subscapular  C(4),5,6,(7)  Posterior Cord                     Teres Major               Lateral Pectoral  C5,6,7  Lateral Cord                     Pectoralis Major-Clavicular Portion                  Pectoralis Major-Sternal Portion               Thoracodorsal  C(5),6,7,8  Posterior Cord                     Latissimus Dorsi               Musculocutaneous  C(4),5,6,7  Lateral Cord                     Biceps and Brachialis                  Coracobrachialis               Axillary  C5,6  Posterior Cord                     Anterior Deltoid                  Middle Deltoid                  Posterior Deltoid                  Teres Minor               Anterior Interosseous of     the Median Nerve  C(5),6,7,8,T1  Lateral and Medial     Cords                     Pronatus Quadratus 5               Flexor Digitorum Profundus 1,2 3   3   +3         Flexor Pollicis Longus 3   3   3      Median Nerve  C(5),6,7,8,T1  Lateral and Medial Cords                     Pronator Teres                  Flexor Carpi Radialis 4   5   5         Flexor Digitorum Superficialis 3   3+   -4         Palmaris Longus                  Flexor Pollicis Brevis (Superficial Head)     3    3         Abductor Pollicis Brevis 3   3+   -4         Opponens Pollicis 3   3   3         Lumbricales 1,2 3   3   3      Radial Nerve  C5,6,7,8,(T1)  Posterior Cord                     Triceps 5               Supinator 5               Brachioradialis 5               Extensor Sint Anthonis 5               Extensor [place] 5 [place] 46         Extensor Digitorum Communis 4   4   4         Extensor Digit Quinti 4   4   4         Extensor Digiti Proprius 3   3+   3+         Extensor Pollicis Longus 3   3   3         Extensor Pollicis Brevis 3   1+   4+         Abductor Pollicis Longus 4      4      Ulnar Nerve  C(7),8,T1  Medial Cord                     Flexor Carpi Ulnaris 4   5            Flexor Digitorum Profundus 3,4 3   4   4         Abductor Digiti Quinti 4 4+  4+          Adductor Pollicis 2   2   2         Opponens Digiti Quinti 3   3   3         Dorsal Interosseous 1 3   3   3         Dorsal Interosseous 2 3   3  3          Dorsal Interosseous 3 3   3   3         Dorsal Interosseous 4 3   3   3         Volar Interosseous 1 3   3   3         Volar Interosseous 2 3   3   3         Volar Interosseous 3 3  3    3         Lumbricales 3,4 3      3         Flexor Pollicis Brevis (deep head)                     Pain Level (0-10 scale) post treatment: 0/10    ASSESSMENT/Changes in Function: strength improving    Patient will continue to benefit from skilled OT services to modify and progress therapeutic interventions, address ROM deficits, address strength deficits and instruct in home and community integration to attain remaining goals. []  See Plan of Care  []  See progress note/recertification  []  See Discharge Summary         Progress towards goals / Updated goals:  Goal:* Pt will have 40 pounds of  in the right hand to allow for functional grasp for all ADL activities including dressing, bathing and self care. At Eval: 25  Status as last note/recert:31      Goal:* Patient will have improved right lateral pinch strength of  6 pounds in order to perform fine motor tasks such as turning pages, turning ignition and open containers. .  At Eval: 3  Status as last note/recert: 3       Goal:* Patient will have improved right tip pinch strength of 4 pounds in order to perform fine motor tasks such as zippiing up zippers or opening containers. At Eval: 2  Status as last note/recert: 2      Goal:* Patient will have improved right dorothy pinch strength of 3 pounds in order to perform fine motor tasks such as writing. At Eval: 1  Status as last note/recert: 3      Goal:* Patient will show a 10 point improvement on FOTO functional status measure to improve overall functional performance.   Status at Eval: 46  Status as last note/recert:50    PLAN  []  Upgrade activities as tolerated     [x]  Continue plan of care  []  Update interventions per flow sheet       []  Discharge due to:_  []  Other:_      EFREN SunshineA/L 11/20/2017  3:34 PM    Future Appointments  Date Time Provider Brenda Randle   11/21/2017 9:15 AM Anastacia Tierney  [address]   11/21/2017 11:00 AM Bob Lucero [facility]   11/27/2017 2:30 PM Bob Lucero [facility]   11/29/2017 2:30 PM Bob Lucero [facility]   12/6/2017 3:00 PM Joey Robertson OT [facility]

## 2017-11-21 ENCOUNTER — HOSPITAL ENCOUNTER (OUTPATIENT)
Dept: PHYSICAL THERAPY | Age: 57
Discharge: HOME | End: 2017-11-21
Payer: COMMERCIAL

## 2017-11-21 ENCOUNTER — OFFICE VISIT (OUTPATIENT)
Dept: ORTHOPEDIC SURGERY | Age: 57
End: 2017-11-21

## 2017-11-21 VITALS
TEMPERATURE: 97.8 F | HEIGHT: 64 IN | OXYGEN SATURATION: 97 % | HEART RATE: 70 BPM | WEIGHT: 208.4 LBS | DIASTOLIC BLOOD PRESSURE: 91 MMHG | BODY MASS INDEX: 35.58 KG/M2 | SYSTOLIC BLOOD PRESSURE: 151 MMHG | RESPIRATION RATE: 18 BRPM

## 2017-11-21 DIAGNOSIS — R29.898 WEAKNESS OF RIGHT HAND: ICD-10-CM

## 2017-11-21 DIAGNOSIS — M48.02 CERVICAL STENOSIS OF SPINE: ICD-10-CM

## 2017-11-21 DIAGNOSIS — Z98.1 S/P CERVICAL SPINAL FUSION: Primary | ICD-10-CM

## 2017-11-21 PROCEDURE — 97760 ORTHOTIC MGMT&TRAING 1ST ENC: CPT

## 2017-11-21 PROCEDURE — L3933 FO W/O JOINTS CF: HCPCS

## 2017-11-21 PROCEDURE — 97110 THERAPEUTIC EXERCISES: CPT

## 2017-11-21 NOTE — PROGRESS NOTES
OT DAILY TREATMENT NOTE - non [facility] 3-16    Patient Name: Suleman Rousseau  Date:2017  : 1960  [x]  Patient  Verified  Payor: Claus Moss / Plan: [employer] / Product Type: HMO /    In time:1100  Out time:1153  Total Treatment Time (min): 53  Visit #: 5 of 8    Treatment Area: Right hand weakness [R29.898]    SUBJECTIVE  Pain Level (0-10 scale): 0/10  Any medication changes, allergies to medications, adverse drug reactions, diagnosis change, or new procedure performed?: [x] No    [] Yes (see summary sheet for update)  Subjective functional status/changes:   [] No changes reported  This splint isn't doing much (soft)    OBJECTIVE  15 min Therapeutic Exercise:  [] See flow sheet :   Rationale: increase ROM and increase strength to improve the patients ability to , pinch    Recheck: , Pinches, Secondary set of Dynamometer and Pinch Gauge used  Wt Well: 1# 5x    20 min Orthotic/Splinting: splint modification, New Splint fabrication () MP Immobility Splint L IF   Rationale: Decrease Triggering  to improve the patients ability to grasp    Oval 8: Resized using coban to improve fitting/comfort      With   [] TE   [] TA   [] neuro   [] other: Patient Education: [x] Review HEP    [] Progressed/Changed HEP based on:   [] positioning   [] body mechanics   [] transfers   [] heat/ice application   [] Splint wear/care   [] Sensory re-education   [] scar management      [] other:             Other Objective/Functional Measures:    Strength:   Measurements: Taken with Mandeep Dynamometer, in Lbs   Level 2 10/2/17 10/17/17 11/2/17 11/21/17 Date Date Date   Right 25   31 29 -new equipment used  28 - Second Set used            Left 53   56    50              Deficit 51%                     Change    R=+6, L=+3       R: -1    L: -6                  Pinch Measurements: Taken with Pinch Gauge, in Lbs   (hand) 10/2/17 10/17/17 11/2/17 11/21/17 Date Date   Lateral        New equipment used   Second Set used           Right 3 3   2      4           Left  11 15   10     12         Deficit                     Change      L+4    R: +2     L: +2           Pad                     Right 2  2  2    3          Left 6  9  7    9           Deficit                     Change      L +3    R: +1    L: +2           Rickey                     Right 1  3   2  3           Left 8  9  6   9           Deficit                     Change      L + 1    R: +1    L: +3               Sensation:   Improved since surgery, only occasion warm sensation      Upper Extremity Manual Muscle Test  Nerve  Nerve root  Brachial Plexus Origin Muscle 10/2/17 10/20/17 Date Date   Long Thoracic  C5,6,7,(8)  Pre-trunk                     Serratus Anterior               Lower Subscapular  C(4),5,6,(7)  Posterior Cord                     Teres Major               Lateral Pectoral  C5,6,7  Lateral Cord                     Pectoralis Major-Clavicular Portion                  Pectoralis Major-Sternal Portion               Thoracodorsal  C(5),6,7,8  Posterior Cord                     Latissimus Dorsi               Musculocutaneous  C(4),5,6,7  Lateral Cord                     Biceps and Brachialis                  Coracobrachialis               Axillary  C5,6  Posterior Cord                     Anterior Deltoid                  Middle Deltoid                  Posterior Deltoid                  Teres Minor               Anterior Interosseous of     the Median Nerve  C(5),6,7,8,T1  Lateral and Medial     Cords                     Pronatus Quadratus 5               Flexor Digitorum Profundus 1,2 3   3   +3         Flexor Pollicis Longus 3   3   3      Median Nerve  C(5),6,7,8,T1  Lateral and Medial Cords                     Pronator Teres                  Flexor Carpi Radialis 4   5   5         Flexor Digitorum Superficialis 3   3+   -4         Palmaris Longus                  Flexor Pollicis Brevis (Superficial Head)     3    3         Abductor Pollicis Brevis 3   4+   -4         Opponens Pollicis 3   3   3         Lumbricales 1,2 3   3   3      Radial Nerve  C5,6,7,8,(T1)  Posterior Cord                     Triceps 5               Supinator 5               Brachioradialis 5               Extensor Carpi Radialis 5               Extensor Carpi Ulnaris 5               Extensor Digitorum Communis 4   4   4         Extensor Digit Quinti 4   4   4         Extensor Digiti Proprius 3   3+   3+         Extensor Pollicis Longus 3   3   3         Extensor Pollicis Brevis 3   0+   2+         Abductor Pollicis Longus 4      4      Ulnar Nerve  C(7),8,T1  Medial Cord                     Flexor Carpi Ulnaris 4   5            Flexor Digitorum Profundus 3,4 3   4   4         Abductor Digiti Quinti 4 4+  4+          Adductor Pollicis 2   2   2         Opponens Digiti Quinti 3   3   3         Dorsal Interosseous 1 3   3   3         Dorsal Interosseous 2 3   3  3          Dorsal Interosseous 3 3   3   3         Dorsal Interosseous 4 3   3   3         Volar Interosseous 1 3   3   3         Volar Interosseous 2 3   3   3         Volar Interosseous 3 3  3    3         Lumbricales 3,4 3      3         Flexor Pollicis Brevis (deep head)                       Pain Level (0-10 scale) post treatment: 0/10    ASSESSMENT/Changes in Function: Pinch strength improving    Patient will continue to benefit from skilled OT services to modify and progress therapeutic interventions, address ROM deficits, address strength deficits, analyze and cue movement patterns and instruct in home and community integration to attain remaining goals. []  See Plan of Care  []  See progress note/recertification  []  See Discharge Summary         Progress towards goals / Updated goals:  Goal:* Pt will have 40 pounds of  in the right hand to allow for functional grasp for all ADL activities including dressing, bathing and self care.   At Eval: 25  Status as last note/recert: 28       Goal:* Patient will have improved right lateral pinch strength of  6 pounds in order to perform fine motor tasks such as turning pages, turning ignition and open containers. .  At Eval: 3  Status as last note/recert: 4        Goal:* Patient will have improved right tip pinch strength of 4 pounds in order to perform fine motor tasks such as zippiing up zippers or opening containers. At Eval: 2  Status as last note/recert: 3      Goal:* Patient will have improved right dorothy pinch strength of 3 pounds in order to perform fine motor tasks such as writing. At Eval: 1  Status as last note/recert: 3  Goal Met 46/78/08      Goal:* Patient will show a 10 point improvement on FOTO functional status measure to improve overall functional performance.   Status at Eval: 46  Status as last note/recert:50    PLAN  []  Upgrade activities as tolerated     [x]  Continue plan of care  []  Update interventions per flow sheet       []  Discharge due to:_  []  Other:_      LATRICIA Lemus/L 11/21/2017  11:06 AM    Future Appointments  Date Time Provider Brenda Randle   11/27/2017 2:30 PM Jj Garcia [facility]   11/29/2017 2:30 PM Jj Garcia [facility]   12/6/2017 3:00 PM Kaveh Brownlee OT [facility]   1/16/2018 9:45 AM Wiley Oconnor  [address]

## 2017-11-21 NOTE — LETTER
NOTIFICATION RETURN TO WORK / SCHOOL 
 
11/21/2017 9:28 AM 
 
Ms. Scott Villalta [address] [facility] [id number] To Whom It May Concern: 
 
Scott Villalta is currently under the care of [facility]. 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 If there are questions or concerns please have the patient contact our office.  
 
 
 
Sincerely, 
 
 
Johnnie Chaudhry MD

## 2017-11-21 NOTE — LETTER
NOTIFICATION RETURN TO WORK / SCHOOL 
 
11/21/2017 10:05 AM 
 
Ms. Desirae Ballesteros [address] [facility] [id number] To Whom It May Concern: 
 
Desirae Ballesteros is currently under the care of [facility]. She will return to work/school with the following restriction; She should avoid strapping children in car seats and seat belts, tying children's shoes, typing on the computer, and other fine motor activities that require ensuring the safety of children. If there are questions or concerns please have the patient contact our office.  
 
 
 
Sincerely, 
 
 
Adrain Burkitt, MD

## 2017-11-21 NOTE — PROGRESS NOTES
[facility]ily [facility]ondu[facility] [address].  [address], [address]  [address]  Phone: [phone]  Fax: [phone]  PROGRESS NOTE  Patient: Scott Villalta                MRN: [id number]       SSN: [id number]  YOB: 1960        AGE: 62 y.o. SEX: female  Body mass index is 35.77 kg/(m^2). PCP: Francheska Flynn MD  11/21/17    Chief Complaint   Patient presents with    Neck Pain     3 month post op       HISTORY OF PRESENT ILLNESS, RADIOGRAPHS, and PLAN:     HISTORY:  Ms. Hermann Nuñez returns today. She is three months out from her cervical decompression and fusion. She continues to be without pain. Her pain is entirely resolved, but she is also continuing to have intrinsic weakness in her right arm. The weakness is not resolved. She has trouble with fine motor control in the right upper extremity. 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 I would rate her intrinsic strength as a 3-4/5. ASSESSMENT/PLAN: At this point, I am going to get her a nerve conduction study. She also says that she has an issue with breathing. She says that first thing in the morning, when she is doing certain activities, she will have problems breathing, and it is position and then it goes away but only for a short period of time in the morning. She is not a very clear historian. I can find no problems with her breathing on exam.  Her x-rays look normal.  There is no edema. She appears to be fused at the level again. She has had total resolution of her pain. I am going to obtain an EMG of that right upper extremity and see what it shows. We may need to obtain a followup MRI or other diagnostic study.      cc: Francheska Flynn MD         Past Medical History:   Diagnosis Date    Diabetes ([facility] [address]     High cholesterol     Hypertension        Family History   Problem Relation Age of Onset    Diabetes Mother     Diabetes Sister     Hypertension Sister     Diabetes Other     Hypertension Other        Current Outpatient Prescriptions   Medication Sig Dispense Refill    oxyCODONE-acetaminophen (PERCOCET)  mg per tablet Take 1 Tab by mouth every six (6) hours as needed for Pain. Max Daily Amount: 4 Tabs. Indications: acute post op pain 30 Tab 0    promethazine (PHENERGAN) 25 mg tablet Take 25 mg by mouth every six (6) hours as needed for Nausea.  simvastatin (ZOCOR) 10 mg tablet TAKE 1 TABLET BY MOUTH ONCE A DAY WITH DINNER      esomeprazole (NEXIUM) 40 mg capsule Take 40 mg by mouth.  gabapentin (NEURONTIN) 100 mg capsule Take 100 mg by mouth.  hydroCHLOROthiazide (HYDRODIURIL) 25 mg tablet Take 25 mg by mouth.  metFORMIN (GLUCOPHAGE) 1,000 mg tablet Take 1,000 mg by mouth.  glimepiride (AMARYL) 4 mg tablet Take 4 mg by mouth.  VITAMIN E-400 PO Take  by mouth.  cholecalciferol (VITAMIN D3) 1,000 unit tablet Take  by mouth daily.  cyanocobalamin (VITAMIN B-12) 1,000 mcg tablet Take 1,000 mcg by mouth daily. Allergies   Allergen Reactions    Ibuprofen Shortness of Breath    Seafood Nausea Only and Swelling       Past Surgical History:   Procedure Laterality Date    HX BREAST BIOPSY      HX CHOLECYSTECTOMY      HX HERNIA REPAIR         Past Medical History:   Diagnosis Date    Diabetes ([facility] [address]     High cholesterol     Hypertension        Social History     Social History    Marital status: UNKNOWN     Spouse name: N/A    Number of children: N/A    Years of education: N/A     Occupational History    Not on file. Social History Main Topics    Smoking status: Former Smoker    Smokeless tobacco: Never Used      Comment: quit 2008 (approx)    Alcohol use No    Drug use: No    Sexual activity: Not on file     Other Topics Concern    Not on file     Social History Narrative         REVIEW OF SYSTEMS:   CONSTITUTIONAL SYMPTOMS:  Negative. EYES:  Negative.    EARS, NOSE, THROAT AND MOUTH: Negative. CARDIOVASCULAR:  Negative. RESPIRATORY:  Negative. GENITOURINARY: Negative. GASTROINTESTINAL:  Negative. INTEGUMENTARY (SKIN AND/OR BREAST):  Negative. MUSCULOSKELETAL: Per HPI.   ENDOCRINE/RHEUMATOLOGIC:  Negative. NEUROLOGICAL:  Per HPI. HEMATOLOGIC/LYMPHATIC:  Negative. ALLERGIC/IMMUNOLOGIC:  Negative. PSYCHIATRIC:  Negative. PHYSICAL EXAMINATION:   Visit Vitals    BP (!) 151/91    Pulse 70    Temp 97.8 °F (36.6 °C) (Oral)    Resp 18    Ht 5' 4\" (1.626 m)    Wt 208 lb 6.4 oz (94.5 kg)    SpO2 97%    BMI 35.77 kg/m2    PAIN SCALE: 0 - No pain/10    CONSTITUTIONAL: The patient is in no apparent distress and is alert and oriented x 3. HEENT: Normocephalic. Hearing grossly intact. NECK: Supple and symmetric. no tenderness, or masses were felt. RESPIRATORY: No labored breathing. CARDIOVASCULAR: The carotid pulses were normal. Peripheral pulses were 2+. CHEST: Normal AP diameter and normal contour without any kyphoscoliosis. LYMPHATIC: No lymphadenopathy was appreciated in the neck, axillae or groin. SKIN:  Incision healing well, no drainage, no erythema, no hernia, no seroma, no swelling, no dehiscence, incision well approximated. Negative for scars, rashes, lesions, or ulcers on the right upper, right lower, left upper, left lower and trunk. NEUROLOGICAL: Alert and oriented x 3. Ambulation without assistive device. FWB. EXTREMITIES: See musculoskeletal.  MUSCULOSKELETAL:   Head and Neck:  Negative for misalignment, asymmetry, crepitation, defects, tenderness masses or effusions.  Left Upper Extremity: Inspection, percussion and palpation preformed. Minors sign is negative.  Right Upper Extremity: Inspection, percussion and palpation preformed. Minors sign is negative.  Spine, Ribs and Pelvis: Inspection, percussion and palpation preformed. Negative for misalignment, asymmetry, crepitation, defects, tenderness masses or effusions.     Left Lower Extremity: Inspection, percussion and palpation preformed. Negative straight leg raise.  Right Lower Extremity: Inspection, percussion and palpation preformed. Negative straight leg raise. SPINE EXAM:     Cervical spine: Neck is midline. Normal muscle tone. No focal atrophy is noted. ASSESSMENT    ICD-10-CM ICD-9-CM    1. S/P cervical spinal fusion Z98.1 V45.4 AMB POC XRAY, SPINE, CERVICAL; 2 OR 3   2. Cervical stenosis of spine M48.02 723.0    3. Weakness of right hand R29.898 728.87 EMG ONE EXTREMITY UPPER RT       Written by Corrina Wong, as dictated by Shawn Estrada MD.    I, Dr. Shawn Estrada MD, confirm that all documentation is accurate.

## 2017-11-21 NOTE — MR AVS SNAPSHOT
Visit Information Date & Time Provider Department Dept. Phone Encounter #  
 11/21/2017  9:15 AM Jodi Huston MD 4 [facility], [address] [facility] [phone] [id number] Follow-up Instructions Return in about 8 weeks (around 1/16/2018) for with Dr. Darling Araujo. Upcoming Health Maintenance Date Due Hepatitis C Screening 1960 DTaP/Tdap/Td series (1 - Tdap) 4/8/1981 PAP AKA CERVICAL CYTOLOGY 4/8/1981 BREAST CANCER SCRN MAMMOGRAM 4/8/2010 FOBT Q 1 YEAR AGE 50-75 4/8/2010 Influenza Age 5 to Adult 8/1/2017 Allergies as of 11/21/2017  Review Complete On: 11/21/2017 By: Jodi Huston MD  
  
 Severity Noted Reaction Type Reactions Ibuprofen  08/04/2017    Shortness of Breath Seafood  08/30/2017    Nausea Only, Swelling Current Immunizations  Never Reviewed No immunizations on file. Not reviewed this visit You Were Diagnosed With   
  
 Codes Comments S/P cervical spinal fusion    -  Primary ICD-10-CM: Z98.1 ICD-9-CM: V45.4 Cervical stenosis of spine     ICD-10-CM: M48.02 
ICD-9-CM: 723.0 Weakness of right hand     ICD-10-CM: R29.898 ICD-9-CM: 728.87 Vitals BP Pulse Temp Resp Height(growth percentile) Weight(growth percentile) (!) 151/91 70 97.8 °F (36.6 °C) (Oral) 18 5' 4\" (1.626 m) 208 lb 6.4 oz (94.5 kg) SpO2 BMI OB Status Smoking Status 97% 35.77 kg/m2 Postmenopausal Former Smoker BMI and BSA Data Body Mass Index Body Surface Area 35.77 kg/m 2 2.07 m 2 Preferred Pharmacy Pharmacy Name Phone [address], [place] [phone] Your Updated Medication List  
  
   
This list is accurate as of: 11/21/17 10:15 AM.  Always use your most recent med list.  
  
  
  
  
 esomeprazole 40 mg capsule Commonly known as:  Cedric Osbaldo Take 40 mg by mouth.  
  
 gabapentin 100 mg capsule Commonly known as:  NEURONTIN  
 Take 100 mg by mouth.  
  
 glimepiride 4 mg tablet Commonly known as:  AMARYL Take 4 mg by mouth. hydroCHLOROthiazide 25 mg tablet Commonly known as:  HYDRODIURIL Take 25 mg by mouth.  
  
 metFORMIN 1,000 mg tablet Commonly known as:  GLUCOPHAGE Take 1,000 mg by mouth. oxyCODONE-acetaminophen  mg per tablet Commonly known as:  PERCOCET Take 1 Tab by mouth every six (6) hours as needed for Pain. Max Daily Amount: 4 Tabs. Indications: acute post op pain  
  
 promethazine 25 mg tablet Commonly known as:  PHENERGAN Take 25 mg by mouth every six (6) hours as needed for Nausea. simvastatin 10 mg tablet Commonly known as:  ZOCOR  
TAKE 1 TABLET BY MOUTH ONCE A DAY WITH DINNER  
  
 VITAMIN B-12 1,000 mcg tablet Generic drug:  cyanocobalamin Take 1,000 mcg by mouth daily. VITAMIN D3 1,000 unit tablet Generic drug:  cholecalciferol Take  by mouth daily. VITAMIN E-400 PO Take  by mouth. We Performed the Following AMB POC XRAY, SPINE, CERVICAL; 2 OR 3 [98849 CPT(R)] Follow-up Instructions Return in about 8 weeks (around 1/16/2018) for with Dr. Benji Andino. To-Do List   
 11/21/2017 Neurology:  EMG ONE EXTREMITY UPPER RT   
  
 11/21/2017 11:00 AM  
  Appointment with Rishi Tolentino at [facility] [address] [place] [address] ([phone])  
  
 11/27/2017 2:30 PM  
  Appointment with Rishi Papa at [facility] PT [address] [place] [address] Road ([phone])  
  
 11/29/2017 2:30 PM  
  Appointment with Rishi Papa at [facility] PT [address] [place] [address] Road ([phone]) 12/06/2017 3:00 PM  
  Appointment with Vitor Abdul OT at [facility] PT [address] [place] [address] ([phone]) Please provide this summary of care documentation to your next provider. Your primary care clinician is listed as Jd Pritchett. If you have any questions after today's visit, please call [phone].

## 2017-11-27 ENCOUNTER — HOSPITAL ENCOUNTER (OUTPATIENT)
Dept: PHYSICAL THERAPY | Age: 57
Discharge: HOME | End: 2017-11-27
Payer: COMMERCIAL

## 2017-11-27 ENCOUNTER — TELEPHONE (OUTPATIENT)
Dept: ORTHOPEDIC SURGERY | Age: 57
End: 2017-11-27

## 2017-11-27 PROCEDURE — 97110 THERAPEUTIC EXERCISES: CPT

## 2017-11-27 NOTE — PROGRESS NOTES
OT DAILY TREATMENT NOTE - non [facility] 3-16    Patient Name: Star Greer  Date:2017  : 1960  [x]  Patient  Verified  Payor: Lan Ch / Plan: [employer] H[employer] / Product Type: HMO /    In time:230  Out time:307  Total Treatment Time (min): 37  Visit #: 6 of 8    Treatment Area: Right hand weakness [R29.898]    SUBJECTIVE  Pain Level (0-10 scale): 0/10  Any medication changes, allergies to medications, adverse drug reactions, diagnosis change, or new procedure performed?: [x] No    [] Yes (see summary sheet for update)  Subjective functional status/changes:   [] No changes reported  I am feeling good. Splint is working.     OBJECTIVE    37 min Therapeutic Exercise:  [] See flow sheet :   Rationale: increase ROM and increase strength to improve the patients ability to use R UE,     UBE: 10 Min Level 2.5  Therabar: Supination/Pronation 20x each  Puttycisor: L Bar: 10 min   Weight Well: 1#   5x        With   [] TE   [] TA   [] neuro   [] other: Patient Education: [x] Review HEP    [] Progressed/Changed HEP based on:   [] positioning   [] body mechanics   [] transfers   [] heat/ice application   [] Splint wear/care   [] Sensory re-education   [] scar management      [] other:             Other Objective/Functional Measures:    Strength:  Measurements: Taken with Mandeep Dynamometer, in Lbs   Level 2 10/2/17 10/17/17 11/2/17 11/21/17 Date Date Date   Right 25 31 29 -new equipment used  28 - Second Set used      Left 53 56  50       Deficit 51%         Change  R=+6, L=+3   R: -1   L: -6       Pinch Measurements: Taken with Pinch Gauge, in Lbs   (hand) 10/2/17 10/17/17 11/2/17 11/21/17 Date Date   Lateral    New equipment used  Second Set used      Right 3 3  2  4      Left  11 15  10  12     Deficit         Change  L+4  R: +2   L: +2      Pad         Right 2 2  2  3      Left 6 9  7  9      Deficit         Change  L +3  R: +1   L: +2      Rickey         Right 1 3  2  3      Left 8 9  6 9      Deficit         Change  L + 1  R: +1   L: +3      Sensation: Improved since surgery, only occasion warm sensation   Upper Extremity Manual Muscle Test   Nerve   Nerve root   Brachial Plexus Origin Muscle 10/2/17 10/20/17 Date Date   Long Thoracic   C5,6,7,(8)   Pre-trunk         Serratus Anterior       Lower Subscapular   C(4),5,6,(7)   Posterior Cord         Teres Major       Lateral Pectoral   C5,6,7   Lateral Cord         Pectoralis Major-Clavicular Portion        Pectoralis Major-Sternal Portion       Thoracodorsal   C(5),6,7,8   Posterior Cord         Latissimus Dorsi       Musculocutaneous   C(4),5,6,7   Lateral Cord         Biceps and Brachialis        Coracobrachialis       Axillary   C5,6   Posterior Cord         Anterior Deltoid        Middle Deltoid        Posterior Deltoid        Teres Minor       Anterior Interosseous of the Median Nerve   C(5),6,7,8,T1   Lateral and Medial Cords         Pronatus Quadratus 5       Flexor Digitorum Profundus 1,2 3 3 +3     Flexor Pollicis Longus 3 3 3    Median Nerve   C(5),6,7,8,T1   Lateral and Medial Cords         Pronator Teres        Flexor Carpi Radialis 4 5 5     Flexor Digitorum Superficialis 3 3+ -4     Palmaris Longus        Flexor Pollicis Brevis (Superficial Head)  3  3     Abductor Pollicis Brevis 3 3+ -4     Opponens Pollicis 3 3 3     Lumbricales 1,2 3 3 3    Radial Nerve   C5,6,7,8,(T1)   Posterior Cord         Triceps 5       Supinator 5       Brachioradialis 5       Extensor Carpi Radialis 5       Extensor Carpi Ulnaris 5       Extensor Digitorum Communis 4 4 4     Extensor Digit Quinti 4 4 4     Extensor Digiti Proprius 3 3+ 3+     Extensor Pollicis Longus 3 3 3     Extensor Pollicis Brevis 3 3+ 3+     Abductor Pollicis Longus 4  4    Ulnar Nerve   C(7),8,T1   Medial Cord         Flexor Carpi Ulnaris 4 5      Flexor Digitorum Profundus 3,4 3 4 4     Abductor Digiti Quinti 4 4+ 4+      Adductor Pollicis 2 2 2     Opponens Digiti Quinti 3 3 3 Dorsal Interosseous 1 3 3 3     Dorsal Interosseous 2 3 3 3      Dorsal Interosseous 3 3 3 3     Dorsal Interosseous 4 3 3 3     Volar Interosseous 1 3 3 3     Volar Interosseous 2 3 3 3     Volar Interosseous 3 3 3  3     Lumbricales 3,4 3  3     Flexor Pollicis Brevis (deep head)             Pain Level (0-10 scale) post treatment: 0/10    ASSESSMENT/Changes in Function: Improvement with strength    Patient will continue to benefit from skilled OT services to modify and progress therapeutic interventions, address ROM deficits, address strength deficits, analyze and address soft tissue restrictions and instruct in home and community integration to attain remaining goals. []  See Plan of Care  []  See progress note/recertification  []  See Discharge Summary           Progress towards goals / Updated goals:  Goal:* Pt will have 40 pounds of  in the right hand to allow for functional grasp for all ADL activities including dressing, bathing and self care. At Eval: 25  Status as last note/recert: 28       Goal:* Patient will have improved right lateral pinch strength of  6 pounds in order to perform fine motor tasks such as turning pages, turning ignition and open containers. .  At Eval: 3  Status as last note/recert: 4        Goal:* Patient will have improved right tip pinch strength of 4 pounds in order to perform fine motor tasks such as zippiing up zippers or opening containers. At Eval: 2  Status as last note/recert: 3      Goal:* Patient will have improved right dorothy pinch strength of 3 pounds in order to perform fine motor tasks such as writing. At Eval: 1  Status as last note/recert: 3  Goal Met 80/10/52      Goal:* Patient will show a 10 point improvement on FOTO functional status measure to improve overall functional performance.   Status at Eval: 46  Status as last note/recert:50    PLAN  []  Upgrade activities as tolerated     [x]  Continue plan of care  []  Update interventions per flow sheet []  Discharge due to:_  []  Other:_      LATRICIA Laureano/L 11/27/2017  3:07 PM    Future Appointments  Date Time Provider Brenda Randle   11/29/2017 2:30 PM Jj Garcia [facility] [facility]   12/6/2017 3:00 PM Ki Garner OT [id number]   1/16/2018 9:45 AM Lorenzo Tolliver  [address]

## 2017-11-27 NOTE — TELEPHONE ENCOUNTER
PT CAME BY MO OFFICE TO REQ THAT THE LETTER WRITTEN ON 11/21/17. PT STATES SHE NEEDS THE EXPECTED DURATION OF HER LIMITATIONS AND A BETTER EXPLANATION OF THE SECTION \"ENSURING THE SAFETY OF CHILDREN\".  PLEASE CALL PT TO ADVISE

## 2017-11-28 NOTE — TELEPHONE ENCOUNTER
-called patient and verified name and . The fax number for letter to be faxed to was [phone].  -the patient was called back and she stated that she received the letter. No further action necessary.

## 2017-11-28 NOTE — TELEPHONE ENCOUNTER
Patient was calling to check on her letter being revised. Please contact patient at [phone]. Patient is asking for the new letter to be read to her and then faxed to her job at [phone].

## 2017-11-29 ENCOUNTER — HOSPITAL ENCOUNTER (OUTPATIENT)
Dept: PHYSICAL THERAPY | Age: 57
Discharge: HOME | End: 2017-11-29
Payer: COMMERCIAL

## 2017-11-29 PROCEDURE — 97110 THERAPEUTIC EXERCISES: CPT

## 2017-11-29 NOTE — PROGRESS NOTES
OT DAILY TREATMENT NOTE - non [facility] 3-16    Patient Name: Kam Gentile  Date:2017  : 1960  [x]  Patient  Verified  Payor: Charmayne Phalen / Plan: [employer] H[employer] / Product Type: HMO /    In time:230  Out time:311  Total Treatment Time (min): 41  Visit #: 7 of 8    Treatment Area: Right hand weakness [R29.898]    SUBJECTIVE  Pain Level (0-10 scale): 0/10  Any medication changes, allergies to medications, adverse drug reactions, diagnosis change, or new procedure performed?: [x] No    [] Yes (see summary sheet for update)  Subjective functional status/changes:   [] No changes reported  I have to go to the MD again, they are going to recheck my nerves     OBJECTIVE    39 min Therapeutic Exercise:  [x] See flow sheet :   Rationale: increase ROM and increase strength to improve the patients ability to functionally use R UE    UBE: 10 min  Wt Well: 1# x5  Puttycissor: L Bar : 15 min  Prehension Review    With   [] TE   [] TA   [] neuro   [] other: Patient Education: [x] Review HEP    [] Progressed/Changed HEP based on:   [] positioning   [] body mechanics   [] transfers   [] heat/ice application   [] Splint wear/care   [] Sensory re-education   [] scar management      [] other:      Other Objective/Functional Measures:    Strength:   Measurements: Taken with Mandeep Dynamometer, in Lbs   Level 2 10/2/17 10/17/17 11/2/17 11/21/17 Date Date Date   Right 25   31 29 -new equipment used  28 - Second Set used            Left 53   56    50              Deficit 51%                     Change    R=+6, L=+3       R: -1    L: -6                  Pinch Measurements: Taken with Pinch Gauge, in Lbs   (hand) 10/2/17 10/17/17 11/2/17 11/21/17 Date Date   Lateral        New equipment used   Second Set used           Right 3 3   2      4           Left  11 15   10     12         Deficit                     Change      L+4    R: +2     L: +2           Pad                     Right 2  2  2    3          Left 6  9  7    9           Deficit                     Change      L +3    R: +1    L: +2           Rickey                     Right 1  3   2  3           Left 8  9  6   9           Deficit                     Change      L + 1    R: +1    L: +3               Sensation:   Improved since surgery, only occasion warm sensation      Upper Extremity Manual Muscle Test  Nerve  Nerve root  Brachial Plexus Origin Muscle 10/2/17 10/20/17 Date Date   Long Thoracic  C5,6,7,(8)  Pre-trunk                     Serratus Anterior               Lower Subscapular  C(4),5,6,(7)  Posterior Cord                     Teres Major               Lateral Pectoral  C5,6,7  Lateral Cord                     Pectoralis Major-Clavicular Portion                  Pectoralis Major-Sternal Portion               Thoracodorsal  C(5),6,7,8  Posterior Cord                     Latissimus Dorsi               Musculocutaneous  C(4),5,6,7  Lateral Cord                     Biceps and Brachialis                  Coracobrachialis               Axillary  C5,6  Posterior Cord                     Anterior Deltoid                  Middle Deltoid                  Posterior Deltoid                  Teres Minor               Anterior Interosseous of     the Median Nerve  C(5),6,7,8,T1  Lateral and Medial     Cords                     Pronatus Quadratus 5               Flexor Digitorum Profundus 1,2 3   3   +3         Flexor Pollicis Longus 3   3   3      Median Nerve  C(5),6,7,8,T1  Lateral and Medial Cords                     Pronator Teres                  Flexor Carpi Radialis 4   5   5         Flexor Digitorum Superficialis 3   3+   -4         Palmaris Longus                  Flexor Pollicis Brevis (Superficial Head)     3    3         Abductor Pollicis Brevis 3   6+   -4         Opponens Pollicis 3   3   3         Lumbricales 1,2 3   3   3      Radial Nerve  C5,6,7,8,(T1)  Posterior Cord                     Triceps 5               Supinator 5               Brachioradialis 5               Extensor Carpi Radialis 5               Extensor Carpi Ulnaris 5               Extensor Digitorum Communis 4   4   4         Extensor Digit Quinti 4   4   4         Extensor Digiti Proprius 3   3+   3+         Extensor Pollicis Longus 3   3   3         Extensor Pollicis Brevis 3   5+   4+         Abductor Pollicis Longus 4      4      Ulnar Nerve  C(7),8,T1  Medial Cord                     Flexor Carpi Ulnaris 4   5            Flexor Digitorum Profundus 3,4 3   4   4         Abductor Digiti Quinti 4 4+  4+          Adductor Pollicis 2   2   2         Opponens Digiti Quinti 3   3   3         Dorsal Interosseous 1 3   3   3         Dorsal Interosseous 2 3   3  3          Dorsal Interosseous 3 3   3   3         Dorsal Interosseous 4 3   3   3         Volar Interosseous 1 3   3   3         Volar Interosseous 2 3   3   3         Volar Interosseous 3 3  3    3         Lumbricales 3,4 3      3         Flexor Pollicis Brevis (deep head)                    FOTO: 55       Pain Level (0-10 scale) post treatment: 0/10    ASSESSMENT/Changes in Function: Activity tolerance improving. Difficulty with pinch prehensions still present     Patient will continue to benefit from skilled OT services to modify and progress therapeutic interventions, address ROM deficits and address strength deficits to attain remaining goals. []  See Plan of Care  []  See progress note/recertification  []  See Discharge Summary         Progress towards goals / Updated goals:    Goal:* Pt will have 40 pounds of  in the right hand to allow for functional grasp for all ADL activities including dressing, bathing and self care. At Eval: 25  Status as last note/recert: 28       Goal:* Patient will have improved right lateral pinch strength of  6 pounds in order to perform fine motor tasks such as turning pages, turning ignition and open containers. .  At Eval: 3  Status as last note/recert: 4        Goal:* Patient will have improved right tip pinch strength of 4 pounds in order to perform fine motor tasks such as zippiing up zippers or opening containers. At Eval: 2  Status as last note/recert: 3      Goal:* Patient will have improved right dorothy pinch strength of 3 pounds in order to perform fine motor tasks such as writing. At Eval: 1  Status as last note/recert: 3  Goal Met 07/36/02      Goal:* Patient will show a 10 point improvement on FOTO functional status measure to improve overall functional performance.   Status at Eval: 46  Status as last note/recert:55       PLAN  []  Upgrade activities as tolerated     [x]  Continue plan of care  []  Update interventions per flow sheet       []  Discharge due to:_  []  Other:_      MIGUEL Jung 11/29/2017  2:35 PM    Future Appointments  Date Time Provider Brenda Randle   12/6/2017 3:00 PM Jeanette Adler OT [id number]   1/16/2018 9:45 AM Alejandrina Loja  [address]

## 2017-12-01 ENCOUNTER — TELEPHONE (OUTPATIENT)
Dept: ORTHOPEDIC SURGERY | Age: 57
End: 2017-12-01

## 2017-12-06 ENCOUNTER — APPOINTMENT (OUTPATIENT)
Dept: PHYSICAL THERAPY | Age: 57
End: 2017-12-06
Payer: COMMERCIAL

## 2017-12-11 ENCOUNTER — HOSPITAL ENCOUNTER (OUTPATIENT)
Dept: PHYSICAL THERAPY | Age: 57
Discharge: HOME | End: 2017-12-11
Payer: COMMERCIAL

## 2017-12-11 PROCEDURE — 97110 THERAPEUTIC EXERCISES: CPT

## 2017-12-11 NOTE — PROGRESS NOTES
OT DAILY TREATMENT NOTE  3-16    Patient Name: Tracey Gee  Date:2017  : 1960  [x]  Patient  Verified  Payor: Benton Alonso / Plan: [employer] / Product Type: HMO /    In time:1105  Out time:1155  Total Treatment Time (min): 50  Visit #: 8 of 8    Treatment Area: Right hand weakness [R29.898]    SUBJECTIVE  Pain Level (0-10 scale): 0/10  Any medication changes, allergies to medications, adverse drug reactions, diagnosis change, or new procedure performed?: [x] No    [] Yes (see summary sheet for update)  Subjective functional status/changes:   [] No changes reported  \"I see the doctor on Thursday for a follow up and hopefully an EMG to see how much change has been made. \"    OBJECTIVE    50 min Therapeutic Exercise:  [x] See flow sheet : Measurements included   Rationale: increase ROM, increase strength and improve coordination to improve the patients ability to increase her functional use in the hands      With   [x] TE   [] TA   [] neuro   [] other: Patient Education: [x] Review HEP    [x] Progressed/Changed HEP based on: Thera-putty  [] positioning   [] body mechanics   [] transfers   [] heat/ice application   [] Splint wear/care   [] Sensory re-education   [] scar management      [] other:             Other Objective/Functional Measures: Strength:   Measurements: Taken with Mandeep Dynamometer, in Lbs   Level 2 10/2/17 10/17/17 11/2/17 11/21/17 12/11/17 Date Date   Right 25   31 29 -new equipment used  28 - Second Set used 25           Left 53   56    50     45         Deficit 51%                     Change    R=+6, L=+3       R: -1    L: -6                  Pinch Measurements: Taken with Pinch Gauge, in Lbs   (hand) 10/2/17 10/17/17 11/2/17 11/21/17 12/11/17 Date   Lateral        New equipment used   Second Set used           Right 3 3   2      4    4      Left  11 15   10     12   12      Deficit                     Change      L+4    R: +2     L: +2           Pad                     Right 2  2  2    3    3      Left 6  9  7    9     5      Deficit                     Change      L +3    R: +1    L: +2           Rickey                     Right 1  3   2  3     3      Left 8  9  6   9     8      Deficit                     Change      L + 1    R: +1    L: +3               Sensation:   Improved since surgery, only occasion warm sensation      Upper Extremity Manual Muscle Test  Nerve  Nerve root  Brachial Plexus Origin Muscle 10/2/17 10/20/17 11/21/17 12/11/17   Long Thoracic  C5,6,7,(8)  Pre-trunk                     Serratus Anterior               Lower Subscapular  C(4),5,6,(7)  Posterior Cord                     Teres Major               Lateral Pectoral  C5,6,7  Lateral Cord                     Pectoralis Major-Clavicular Portion                  Pectoralis Major-Sternal Portion               Thoracodorsal  C(5),6,7,8  Posterior Cord                     Latissimus Dorsi               Musculocutaneous  C(4),5,6,7  Lateral Cord                     Biceps and Brachialis                  Coracobrachialis               Axillary  C5,6  Posterior Cord                     Anterior Deltoid                  Middle Deltoid                  Posterior Deltoid                  Teres Minor               Anterior Interosseous of     the Median Nerve  C(5),6,7,8,T1  Lateral and Medial     Cords                     Pronatus Quadratus 5               Flexor Digitorum Profundus 1,2 3   3   +3   4      Flexor Pollicis Longus 3   3   3  -4   Median Nerve  C(5),6,7,8,T1  Lateral and Medial Cords                     Pronator Teres                  Flexor Carpi Radialis 4   5   5   5      Flexor Digitorum Superficialis 3   3+   -4   4      Palmaris Longus                  Flexor Pollicis Brevis (Superficial Head)     3    3   -4      Abductor Pollicis Brevis 3   2+   -4   -4      Opponens Pollicis 3   3   3   +3      Lumbricales 1,2 3   3   3   3   Radial Nerve  C5,6,7,8,(T1)  Posterior Cord                     Triceps 5               Supinator 5               Brachioradialis 5               Extensor Carpi Radialis 5               Extensor Carpi Ulnaris 5               Extensor Digitorum Communis 4   4   4   4      Extensor Digit Quinti 4   4   4   4      Extensor Digiti Proprius 3   3+   3+   4      Extensor Pollicis Longus 3   3   3   -4      Extensor Pollicis Brevis 3   9+   0+   -4      Abductor Pollicis Longus 4      4   4   Ulnar Nerve  C(7),8,T1  Medial Cord                     Flexor Carpi Ulnaris 4   5            Flexor Digitorum Profundus 3,4 3   4   4   4      Abductor Digiti Quinti 4 4+  4+    4+      Adductor Pollicis 2   2   2   2      Opponens Digiti Quinti 3   3   3   3      Dorsal Interosseous 1 3   3   3   3      Dorsal Interosseous 2 3   3  3    3      Dorsal Interosseous 3 3   3   3   3      Dorsal Interosseous 4 3   3   3   3      Volar Interosseous 1 3   3   3   3      Volar Interosseous 2 3   3   3   3      Volar Interosseous 3 3  3    3   3      Lumbricales 3,4 3      3   3      Flexor Pollicis Brevis (deep head)                      FOTO: 55      Pain Level (0-10 scale) post treatment: 0/10     ASSESSMENT/Changes in Function: t-putty HEP initiated for home to improve strength and functional use of  Bilateral UE     Patient will continue to benefit from skilled OT services to modify and progress therapeutic interventions, address ROM deficits and address strength deficits to attain remaining goals.      []  See Plan of Care  [x]  See progress note/recertification  []  See Discharge Summary      Progress towards goals / Updated goals:     Goal:* Pt will have 40 pounds of  in the right hand to allow for functional grasp for all ADL activities including dressing, bathing and self care.   At Eval: 25  Status as last note/recert: 28       Goal:* Patient will have improved right lateral pinch strength of  6 pounds in order to perform fine motor tasks such as turning pages, turning ignition and open containers. .  At Eval: 3  Status as last note/recert: 4        Goal:* Patient will have improved right tip pinch strength of 4 pounds in order to perform fine motor tasks such as zippiing up zippers or opening containers. At Eval: 2  Status as last note/recert: 3      Goal:* Patient will have improved right dorothy pinch strength of 3 pounds in order to perform fine motor tasks such as writing. At Eval: 1  Status as last note/recert: 3  Goal Met 19/53/75      Goal:* Patient will show a 10 point improvement on FOTO functional status measure to improve overall functional performance.   Status at Eval: 46  Status as last note/recert:55    PLAN  []  Upgrade activities as tolerated     [x]  Continue plan of care  []  Update interventions per flow sheet       []  Discharge due to:_  []  Other:_      Payton Cuevas OT 12/11/2017  11:23 AM    Future Appointments  Date Time Provider Brenda Randle   1/16/2018 9:45 AM Radha Huber  [address]

## 2017-12-11 NOTE — PROGRESS NOTES
[facility]  [address]  [place], [address]  [phone] fax    Occupational Therapy Physician Update  [x] Progress Note  [] Discharge Summary  Patient name: Kayleigh Garcia Start of Care: 10/2/17   Referral source: Sb Padilla NP : 1960   Medical/Treatment Diagnosis: Right hand weakness [R29.898] Onset Date:2017     Prior Hospitalization: see medical history Provider#: [id number]   Medications: Verified on Patient Summary List    Comorbidities: HTN, cervical stenosis, diabetes,    Prior Level of Function: Independent without limitations in ADL and IADL activities.     Visits from Start of Care: 16    Missed Visits: 1    Status at Evaluation/Last Progress Note:  Strength:   Measurements: Taken with Mandeep Dynamometer, in Lbs   Level 2 10/2/17 10/17/17 11/2/17 11/21/17 12/11/17 Date Date   Right 25   31 29 -new equipment used  28 - Second Set used 25           Left 53   56    50     45         Deficit 51%                     Change    R=+6, L=+3       R: -1    L: -6                  Pinch Measurements: Taken with Pinch Gauge, in Lbs   (hand) 10/2/17 10/17/17 11/2/17 11/21/17 12/11/17 Date   Lateral        New equipment used   Second Set used           Right 3 3   2      4    4      Left  11 15   10     12   12      Deficit                     Change      L+4    R: +2     L: +2           Pad                     Right 2  2  2    3    3      Left 6  9  7    9     5      Deficit                     Change      L +3    R: +1    L: +2           Rickey                     Right 1  3   2  3     3      Left 8  9  6   9     8      Deficit                     Change      L + 1    R: +1    L: +3               Sensation:   Improved since surgery, only occasion warm sensation      Upper Extremity Manual Muscle Test  Nerve  Nerve root  Brachial Plexus Origin Muscle 10/2/17 10/20/17 11/21/17 12/11/17   Long Thoracic  C5,6,7,(8)  Pre-trunk                   Serratus Anterior               Lower Subscapular  C(4),5,6,(7)  Posterior Cord                     Teres Major               Lateral Pectoral  C5,6,7  Lateral Cord                     Pectoralis Major-Clavicular Portion                  Pectoralis Major-Sternal Portion               Thoracodorsal  C(5),6,7,8  Posterior Cord                     Latissimus Dorsi               Musculocutaneous  C(4),5,6,7  Lateral Cord                     Biceps and Brachialis                  Coracobrachialis               Axillary  C5,6  Posterior Cord                     Anterior Deltoid                  Middle Deltoid                  Posterior Deltoid                  Teres Minor               Anterior Interosseous of     the Median Nerve  C(5),6,7,8,T1  Lateral and Medial     Cords                     Pronatus Quadratus 5               Flexor Digitorum Profundus 1,2 3   3   +3   4      Flexor Pollicis Longus 3   3   3  -4   Median Nerve  C(5),6,7,8,T1  Lateral and Medial Cords                     Pronator Teres                  Flexor Carpi Radialis 4   5   5   5      Flexor Digitorum Superficialis 3   3+   -4   4      Palmaris Longus                  Flexor Pollicis Brevis (Superficial Head)     3    3   -4      Abductor Pollicis Brevis 3   1+   -4   -4      Opponens Pollicis 3   3   3   +3      Lumbricales 1,2 3   3   3   3   Radial Nerve  C5,6,7,8,(T1)  Posterior Cord                     Triceps 5               Supinator 5               Brachioradialis 5               Extensor Carpi Radialis 5               Extensor Carpi Ulnaris 5               Extensor Digitorum Communis 4   4   4   4      Extensor Digit Quinti 4   4   4   4      Extensor Digiti Proprius 3   3+   3+   4      Extensor Pollicis Longus 3   3   3   -4      Extensor Pollicis Brevis 3   1+   5+   -4      Abductor Pollicis Longus 4      4   4   Ulnar Nerve  C(7),8,T1  Medial Cord                     Flexor Carpi Ulnaris 4   8            Flexor Digitorum Profundus 3,4 3   4   4   4      Abductor Digiti Quinti 4 4+  4+    4+      Adductor Pollicis 2   2   2   2      Opponens Digiti Quinti 3   3   3   3      Dorsal Interosseous 1 3   3   3   3      Dorsal Interosseous 2 3   3  3    3      Dorsal Interosseous 3 3   3   3   3      Dorsal Interosseous 4 3   3   3   3      Volar Interosseous 1 3   3   3   3      Volar Interosseous 2 3   3   3   3      Volar Interosseous 3 3  3    3   3      Lumbricales 3,4 3      3   3      Flexor Pollicis Brevis (deep head)                 Progress toward goals:  Goal:* Pt will have 40 pounds of  in the right hand to allow for functional grasp for all ADL activities including dressing, bathing and self care. At Eval: 25  Status as last note/recert: 25      Goal:* Patient will have improved right lateral pinch strength of  6 pounds in order to perform fine motor tasks such as turning pages, turning ignition and open containers. .  At Eval: 3  Status as last note/recert: 4        Goal:* Patient will have improved right tip pinch strength of 4 pounds in order to perform fine motor tasks such as zippiing up zippers or opening containers. At Eval: 2  Status as last note/recert: 3      Goal:* Patient will have improved right dorothy pinch strength of 3 pounds in order to perform fine motor tasks such as writing. At Eval: 1  Status as last note/recert: 3  Goal Met 51/79/74      Goal:* Patient will show a 10 point improvement on FOTO functional status measure to improve overall functional performance. Status at Eval: 46  Status as last note/recert:55    ASSESSMENT/RECOMMENDATIONS: Patient has made minimal progress with strength for  and pinch, she continues to have poor intrinsic strength. She has been provide with t-putty HEP and shows independence with activity.      []Continue therapy per initial plan/protocol at a frequency of   x per week for  weeks  []Continue therapy with the following recommended changes:_____________________ _____________________________________________________________________  []Discontinue therapy progressing towards or have reached established goals  []Discontinue therapy due to lack of appreciable progress towards goals  []Discontinue therapy due to lack of attendance or compliance  [x]Await Physician's recommendations/decisions regarding therapy  []Other:________________________________________________________________    Thank you for this referral. Ki Garner OT 12/11/2017 11:57 AM  NOTE TO PHYSICIAN:  [address]   FAX TO [facility]: [phone]  If you are unable to process this request in 24 hours please contact our office: [phone]    ? I have read the above report and request that my patient continue as recommended. ? I have read the above report and request that my patient continue therapy with the following changes/special instructions:___________________________________________________________  ? I have read the above report and request that my patient be discharged from therapy.     Physicians signature: ______________________________Date: ______Time:______

## 2018-01-16 ENCOUNTER — OFFICE VISIT (OUTPATIENT)
Dept: ORTHOPEDIC SURGERY | Age: 58
End: 2018-01-16

## 2018-01-16 VITALS
RESPIRATION RATE: 18 BRPM | BODY MASS INDEX: 35.99 KG/M2 | OXYGEN SATURATION: 99 % | SYSTOLIC BLOOD PRESSURE: 155 MMHG | TEMPERATURE: 98.3 F | HEART RATE: 84 BPM | WEIGHT: 210.8 LBS | HEIGHT: 64 IN | DIASTOLIC BLOOD PRESSURE: 82 MMHG

## 2018-01-16 DIAGNOSIS — Z98.1 S/P CERVICAL SPINAL FUSION: ICD-10-CM

## 2018-01-16 DIAGNOSIS — R29.898 WEAKNESS OF RIGHT HAND: ICD-10-CM

## 2018-01-16 DIAGNOSIS — G54.0 BRACHIAL PLEXOPATHY: Primary | ICD-10-CM

## 2018-01-16 NOTE — PROGRESS NOTES
[facility] [address]  [address], [address]  [place], [address] [place] [address] Street  Phone: [phone]  Fax: [phone]  PROGRESS NOTE  Patient: Melida Monique                MRN: [id number]       SSN: [id number]  YOB: 1960        AGE: 62 y.o. SEX: female  Body mass index is 36.18 kg/(m^2). PCP: Alex Hardwick MD  01/16/18    Chief Complaint   Patient presents with    Neck Pain     EMG follow up       HISTORY OF PRESENT ILLNESS, RADIOGRAPHS, and PLAN:     HISTORY:  Ms. Melissa Salazar returns today. She is approximately five months status post her cervical discectomy and fusion at C7-T1. Her bilateral upper extremity symptoms have resolved in terms of pain, but she continues to have this right-sided, intrinsic weakness. Her exam is otherwise benign. She is having almost an intrinsic contracture on the right hand. I sent her for an EMG. The EMG performed was abnormal.  It felt suggestive of a medial cord brachial plexus lesion, possible coexisting lower extremity radiculopathy. It was recommended by the neurologist to obtain a brachial plexus MRI and to repeat the EMG in three months time. Again, the patient is not having any pain any longer. RADIOGRAPHS:  Her neck x-rays look good. ASSESSMENT/PLAN: At this time, we will continue physical therapy and hand therapy working on intrinsic strengthening on the right and obtain the brachial plexus MRI. The patients breathing issues, which she complained of previously, are episodic. She said she had some variability in her weight. It is always concerning. We will obtain the brachial plexus MRI, and I will see her back in follow up.      cc: Alex Hardwick MD         Past Medical History:   Diagnosis Date    Diabetes ([facility] [address]     High cholesterol     Hypertension        Family History   Problem Relation Age of Onset    Diabetes Mother     Diabetes Sister     Hypertension Sister     Diabetes Other     Hypertension Other        Current Outpatient Prescriptions   Medication Sig Dispense Refill    promethazine (PHENERGAN) 25 mg tablet Take 25 mg by mouth every six (6) hours as needed for Nausea.  simvastatin (ZOCOR) 10 mg tablet TAKE 1 TABLET BY MOUTH ONCE A DAY WITH DINNER      esomeprazole (NEXIUM) 40 mg capsule Take 40 mg by mouth.  gabapentin (NEURONTIN) 100 mg capsule Take 100 mg by mouth.  hydroCHLOROthiazide (HYDRODIURIL) 25 mg tablet Take 25 mg by mouth.  metFORMIN (GLUCOPHAGE) 1,000 mg tablet Take 1,000 mg by mouth.  glimepiride (AMARYL) 4 mg tablet Take 4 mg by mouth.  VITAMIN E-400 PO Take  by mouth.  cholecalciferol (VITAMIN D3) 1,000 unit tablet Take  by mouth daily.  cyanocobalamin (VITAMIN B-12) 1,000 mcg tablet Take 1,000 mcg by mouth daily. Allergies   Allergen Reactions    Aspirin Shortness of Breath    Percocet [Oxycodone-Acetaminophen] Rash    Ibuprofen Shortness of Breath    Seafood Nausea Only and Swelling       Past Surgical History:   Procedure Laterality Date    HX BREAST BIOPSY      HX CHOLECYSTECTOMY      HX HERNIA REPAIR         Past Medical History:   Diagnosis Date    Diabetes ([facility] [address]     High cholesterol     Hypertension        Social History     Social History    Marital status: UNKNOWN     Spouse name: N/A    Number of children: N/A    Years of education: N/A     Occupational History    Not on file. Social History Main Topics    Smoking status: Former Smoker    Smokeless tobacco: Never Used      Comment: quit 2008 (approx)    Alcohol use No    Drug use: No    Sexual activity: Not on file     Other Topics Concern    Not on file     Social History Narrative         REVIEW OF SYSTEMS:   CONSTITUTIONAL SYMPTOMS:  Negative. EYES:  Negative. EARS, NOSE, THROAT AND MOUTH:  Negative. CARDIOVASCULAR:  Negative. RESPIRATORY:  Negative. GENITOURINARY: Negative. GASTROINTESTINAL:  Negative.    INTEGUMENTARY (SKIN AND/OR BREAST):  Negative. MUSCULOSKELETAL: Per HPI.   ENDOCRINE/RHEUMATOLOGIC:  Negative. NEUROLOGICAL:  Per HPI. HEMATOLOGIC/LYMPHATIC:  Negative. ALLERGIC/IMMUNOLOGIC:  Negative. PSYCHIATRIC:  Negative. PHYSICAL EXAMINATION:   Visit Vitals    /82    Pulse 84    Temp 98.3 °F (36.8 °C) (Oral)    Resp 18    Ht 5' 4\" (1.626 m)    Wt 210 lb 12.8 oz (95.6 kg)    SpO2 99%    BMI 36.18 kg/m2    PAIN SCALE: 0 - No pain/10    CONSTITUTIONAL: The patient is in no apparent distress and is alert and oriented x 3. HEENT: Normocephalic. Hearing grossly intact. NECK: Supple and symmetric. no tenderness, or masses were felt. RESPIRATORY: No labored breathing. CARDIOVASCULAR: The carotid pulses were normal. Peripheral pulses were 2+. CHEST: Normal AP diameter and normal contour without any kyphoscoliosis. LYMPHATIC: No lymphadenopathy was appreciated in the neck, axillae or groin. SKIN:  Negative for scars, rashes, lesions, or ulcers on the right upper, right lower, left upper, left lower and trunk. NEUROLOGICAL: Alert and oriented x 3. Ambulation without assistive device. FWB. EXTREMITIES: See musculoskeletal.  MUSCULOSKELETAL:   Head and Neck:  Negative for misalignment, asymmetry, crepitation, defects, tenderness masses or effusions.  Left Upper Extremity: Inspection, percussion and palpation preformed. Minors sign is negative.  Right Upper Extremity: Weakness. Inspection, percussion and palpation preformed. Minors sign is negative.  Spine, Ribs and Pelvis: Inspection, percussion and palpation preformed. Negative for misalignment, asymmetry, crepitation, defects, tenderness masses or effusions.  Left Lower Extremity: Inspection, percussion and palpation preformed. Negative straight leg raise.  Right Lower Extremity: Inspection, percussion and palpation preformed. Negative straight leg raise. SPINE EXAM:     Cervical spine: Neck is midline.  Normal muscle tone. No focal atrophy is noted. ASSESSMENT    ICD-10-CM ICD-9-CM    1. Brachial plexopathy G54.0 353.0 MRI SHOULDER RT WO CONT      REFERRAL TO OCCUPATIONAL THERAPY   2. Weakness of right hand R29.898 728.87 MRI SHOULDER RT WO CONT      REFERRAL TO OCCUPATIONAL THERAPY   3. S/P cervical spinal fusion Z98.1 V45.4        Written by Robbin Dillard, as dictated by Alfredo Madison MD.    I, Dr. Alfredo Madison MD, confirm that all documentation is accurate.

## 2018-01-16 NOTE — MR AVS SNAPSHOT
Alejandro Renteria 
 
 
 [address] [facility] [address] [facility] [address] 
[phone] Patient: Brando Moulton MRN: [id number]:[id number] Visit Information Date & Time Provider Department Dept. Phone Encounter #  
 1/16/2018  9:45 AM Chon Newman MD [address] [place] [address], [address] and [facility] [phone] [id number] Follow-up Instructions Return for MRI/CT f/u. Follow-up and Disposition History Upcoming Health Maintenance Date Due Hepatitis C Screening 1960 DTaP/Tdap/Td series (1 - Tdap) 4/8/1981 PAP AKA CERVICAL CYTOLOGY 4/8/1981 BREAST CANCER SCRN MAMMOGRAM 4/8/2010 FOBT Q 1 YEAR AGE 50-75 4/8/2010 Influenza Age 5 to Adult 8/1/2017 Allergies as of 1/16/2018  Review Complete On: 1/16/2018 By: Arron Chavez Severity Noted Reaction Type Reactions Aspirin Medium 01/16/2018    Shortness of Breath Percocet [Oxycodone-acetaminophen] Medium 01/16/2018    Rash Ibuprofen  08/04/2017    Shortness of Breath Seafood  08/30/2017    Nausea Only, Swelling Current Immunizations  Never Reviewed No immunizations on file. Not reviewed this visit You Were Diagnosed With   
  
 Codes Comments Brachial plexopathy    -  Primary ICD-10-CM: G54.0 ICD-9-CM: 353.0 Weakness of right hand     ICD-10-CM: R29.898 ICD-9-CM: 728.87 S/P cervical spinal fusion     ICD-10-CM: Z98.1 ICD-9-CM: V45.4 Vitals BP Pulse Temp Resp Height(growth percentile) Weight(growth percentile) 155/82 84 98.3 °F (36.8 °C) (Oral) 18 5' 4\" (1.626 m) 210 lb 12.8 oz (95.6 kg) SpO2 BMI OB Status Smoking Status 99% 36.18 kg/m2 Postmenopausal Former Smoker BMI and BSA Data Body Mass Index Body Surface Area  
 36.18 kg/m 2 2.08 m 2 Preferred Pharmacy Pharmacy Name Phone [address], [place] [facility] [phone] Your Updated Medication List  
  
   
This list is accurate as of: 1/16/18 11:11 AM.  Always use your most recent med list.  
  
  
  
  
 esomeprazole 40 mg capsule Commonly known as:  Chirstofer Jetty Take 40 mg by mouth.  
  
 gabapentin 100 mg capsule Commonly known as:  NEURONTIN Take 100 mg by mouth.  
  
 glimepiride 4 mg tablet Commonly known as:  AMARYL Take 4 mg by mouth. hydroCHLOROthiazide 25 mg tablet Commonly known as:  HYDRODIURIL Take 25 mg by mouth.  
  
 metFORMIN 1,000 mg tablet Commonly known as:  GLUCOPHAGE Take 1,000 mg by mouth.  
  
 promethazine 25 mg tablet Commonly known as:  PHENERGAN Take 25 mg by mouth every six (6) hours as needed for Nausea. simvastatin 10 mg tablet Commonly known as:  ZOCOR  
TAKE 1 TABLET BY MOUTH ONCE A DAY WITH DINNER  
  
 VITAMIN B-12 1,000 mcg tablet Generic drug:  cyanocobalamin Take 1,000 mcg by mouth daily. VITAMIN D3 1,000 unit tablet Generic drug:  cholecalciferol Take  by mouth daily. VITAMIN E-400 PO Take  by mouth. We Performed the Following REFERRAL TO OCCUPATIONAL THERAPY [REF53 Custom] Follow-up Instructions Return for MRI/CT f/u. To-Do List   
 01/16/2018 Imaging:  MRI SHOULDER RT WO CONT   
  
 01/16/2018 Imaging:  XR CHEST AP LAT   
  
 01/25/2018 11:00 AM  
  Appointment with [facility] RM 1 at [address] [place] [address] ([phone]) GENERAL INSTRUCTIONS  Bring information (ID card) if you have any medically implanted devices. You will be required to lie still while the procedure is being performed. Remove any jewelry (including body piercing, hairpins) prior to MRI. If you have had a creatinine level drawn within the past 30 days, please bring most recent results to your appt.   Bring any films, CD's, and reports related to your study with you on the day of your exam.  This only includes studies done outside of [facility], [facility], [facility], and [facility]. Bring a complete list of all medications you are currently taking to include prescriptions, over-the-counter meds, herbals, vitamins & any dietary supplements. If you were given medications for claustrophobia or anxiety, please arrange to have someone drive you to your appointment. QUESTIONS  Notify the MRI Department if you have any questions concerning your study. [facility] - [phone] [address] [place]  [phone] Referral Information Referral ID Referred By Referred To  
  
 [id number] Dhruv Reason B Not Available Visits Status Start Date End Date 1 New Request 1/16/18 1/16/19 If your referral has a status of pending review or denied, additional information will be sent to support the outcome of this decision. Please provide this summary of care documentation to your next provider. Your primary care clinician is listed as Sole Morgan. If you have any questions after today's visit, please call [phone].

## 2018-01-31 ENCOUNTER — HOSPITAL ENCOUNTER (OUTPATIENT)
Dept: PHYSICAL THERAPY | Age: 58
Discharge: HOME | End: 2018-01-31
Payer: COMMERCIAL

## 2018-01-31 ENCOUNTER — TELEPHONE (OUTPATIENT)
Dept: ORTHOPEDIC SURGERY | Age: 58
End: 2018-01-31

## 2018-01-31 PROCEDURE — 97166 OT EVAL MOD COMPLEX 45 MIN: CPT

## 2018-01-31 NOTE — TELEPHONE ENCOUNTER
Pt came in today to request a letter stating that she in currently under the care of Dr Royer Wyatt. Please advise. Thank you.

## 2018-01-31 NOTE — PROGRESS NOTES
Hand Therapy Evaluation and Daily Note    Patient Name: Jony Eagle Pass  Date:2018  : 1960  Age: 62 y.o.y/o  [x]  Patient  Verified  Payor: Regina Handy / Plan: [employer]MO / Product Type: HMO /    Referring Provider: MD Ciara Robles MD Visit:  2 weeks  Onset Date:  2017  Surgical Date: 17  Surgical Procedure: PROCEDURES PERFORMED  1. Anterior cervical decompression and fusion C7-T1. 2. Zero-P interbody device and cervical plate fixation. 3. Zero-P plate and screws. 4. Demineralized bone matrix. In time:1030  Out time:1120  Total Treatment Time (min): 50  Visit #: 1 of 8    Treatment Area: Brachial plexus disorders [G54.0]  Other symptoms and signs involving the musculoskeletal system [R29.898]    Precautions: Na    Hand Dominance: right handed   Hand Involved: right    Total Evaluation Time:  40 min    History of Present Condition:  Patient is a 62 y.o. female with a chief complaint  of right UE weakness    Pain Rating:   Current: (0-no pain 10-debilitating pain) 1 / 10   At best: (0-no pain 10-debilitating pain) 1 / 10  At worst: (0-no pain 10-debilitating pain) no  Location: NA  Type:  no   Better with: NA  Worse with: NA    Medications/Allergies/Past Medical History:  See chart; reviewed with patient.  HTN, cervical stenosis, diabetes    Diagnostic Tests: EMG scheduled    Prior Level of Function: limited secondary to nerve compression    Current Level of Function:  Limited secondary to poor intrinsic strength    Social History:   Occupation/Job Requirements: provider    Observation: continues to have intrinsic muscle wasting in the right UE    Palpation:  NA    Range of Motion:   Hand ROM 18   2nd 3rd 4th 5th Thumb   MP 0/ 0/ 0/ 0/    PIP 16/ 20/ 24/ 16/    DIP 20/ 20/ 10/ 18/    PABD    20/25    RABD    25/50    SCHWARTZ             Strength:   Measurements: Taken with Mandeep Dynamometer, in Lbs   Level 2 18 Date Date Date Date Date Date   Right 31         Left 55         Deficit          Change                Pinch Measurements: Taken with Pinch Gauge, in Lbs   (hand) 1/31/18 Date Date Date Date Date   Lateral          Right 4        Left  12        Deficit         Change         Pad         Right 3        Left 6        Deficit         Change         Rickey         Right 3        Left 7        Deficit         Change           Upper Extremity Manual Muscle Test  Nerve  Nerve root  Brachial Plexus Origin Muscle 1/31/18 Date Date Date   Musculocutaneous  C(4),5,6,7  Lateral Cord         Biceps and Brachialis 5       Coracobrachialis 5      Axillary  C5,6  Posterior Cord         Anterior Deltoid 5       Middle Deltoid 5       Posterior Deltoid 5       Teres Minor 5      Anterior Interosseous of     the Median Nerve  C(5),6,7,8,T1  Lateral and Medial     Cords         Pronatus Quadratus 5       Flexor Digitorum Profundus 1,2 3       Flexor Pollicis Longus 3      Median Nerve  C(5),6,7,8,T1  Lateral and Medial Cords         Pronator Teres 3       Flexor Carpi Radialis 4       Flexor Digitorum Superficialis IF-4       Palmaris Longus        Flexor Pollicis Brevis (Superficial Head) 3       Abductor Pollicis Brevis -4       Opponens Pollicis 3       Lumbricales 1,2 2      Radial Nerve  C5,6,7,8,(T1)  Posterior Cord         Triceps 4       Supinator 5       Brachioradialis 5       Extensor Carpi Radialis 5       Extensor Carpi Ulnaris 5       Extensor Digitorum Communis 5       Extensor Digit Quinti 5       Extensor Digiti Proprius 5       Extensor Pollicis Longus 5       Extensor Pollicis Brevis 5       Abductor Pollicis Longus 5      Ulnar Nerve  C(7),8,T1  Medial Cord         Flexor Carpi Ulnaris 5       Flexor Digitorum Profundus 3,4 5       Abductor Digiti Quinti 5       Adductor Pollicis 4       Opponens Digiti Quinti 2       Dorsal Interosseous 1 2       Dorsal Interosseous 2 2       Dorsal Interosseous 3 2       Dorsal Interosseous 4 2 Volar Interosseous 1 2       Volar Interosseous 2 2       Volar Interosseous 3 2       Lumbricales 3,4 2       Flexor Pollicis Brevis (deep head)         Key  5 Normal Full range against gravity & maximum resistance  4 Good  Full range against gravity &  resistance  3 Fair  Full range against gravity  2 Poor  Full range gravity eliminated  1 Trace  Perceptible contraction, no movement of part  0 Zero  No contraction    Sensation:  Normal sensation    Edema: NA    Special Tests:  NT    ADLs  Feeding:        []MaxA   []ModA   []Stanley   [] CGA   []SBA   []Earnestine   [x]Independent  UE Dressing:       []MaxA   []ModA   []Stanley   [] CGA   []SBA   []Earnestine   [x]Independent  LE Dressing:       []MaxA   []ModA   []Stanley   [] CGA   []SBA   []Earnestine   [x]Independent  Grooming:       []MaxA   []ModA   []Stanley   [] CGA   []SBA   []Earnestine   [x]Independent  Toileting:       []MaxA   []ModA   []Stanley   [] CGA   []SBA   []Earnestine   [x]Independent  Bathing:       []MaxA   []ModA   []Stanley   [] CGA   []SBA   []Earnestine   [x]Independent  Light Meal Prep:    []MaxA   []ModA   []Stanley   [] CGA   []SBA   []Earnestine   [x]Independent  Household/Other: []MaxA   []ModA   []Stanley   [] CGA   []SBA   []Earnestine   [x]Independent  Adaptive Equip:     []MaxA   []ModA   []Stanley   [] CGA   []SBA   []Earnestine   []Independent  Driving:       []MaxA   []ModA   []Stanley   [] CGA   []SBA   []Earnestine   [x]Independent      Todays Treatment:  Patient received an initial evaluation today followed by education as to diagnosis, precautions and treatment plan. Patient was provided with a basic home exercise program during previous treatment sessions.       OBJECTIVE    With   [x] TE   [] TA   [] neuro   [] other: Patient Education: [x] Review HEP    [] Progressed/Changed HEP based on:   [] positioning   [] body mechanics   [] transfers   [] heat/ice application   [] Splint wear/care   [] Sensory re-education   [] scar management      [] other:      Pain Level (0-10 scale) post treatment: 0/10    Patient will continue to benefit from skilled OT services to address ROM deficits and address strength deficits to attain goals. Assessment: * Patient is a 62 y.o. female with a chief complaint  of weakness in the right hand since May of 2017. Patient reports right hand numbness and weakness, loss of intrinsic strength, dexterity.  She continues to have consistent signs and symptoms of upper motor nerve compression in the right with minimal change in strength since last visit.  She continues to demonstrate poor strength in the intrinsics of the hand in distributions from the median and ulnar nerves.  Poor opposition and difficulty with holding objects.      Patient received an initial evaluation today followed by education as to diagnosis, precautions and treatment plan.  Patient was provided with a home exercise program at initial evaluation on 10/2/17.     Evaluation Complexity: History LOW Complexity : Brief history review  Examination MEDIUM Complexity : 3-5 performance deficits relating to physical, cognitive , or psychosocial skils that result in activity limitations and / or participation restrictions Clinical Decision Making LOW Complexity : No comorbidities that affect functional and no verbal or physical assistance needed to complete eval tasks   Overall Complexity Rating: MEDIUM     Problem List: Decreased range of motion, Decreased strength, Decreased coordination/prehension and Decreased ADL/functional abilities                 Treatment Plan may include any combination of the following: Therapeutic exercise, Neuromuscular re-education, Physical agent/modality, Manual therapy, Splinting/orthoses and Patient education     Patient / Family readiness to learn indicated by: asking questions and interest     Persons(s) to be included in education:   patient (P)     Barriers to Learning/Limitations: None     Patient Goal (s): improve hand strength     Patient Self Reported Health Status: good     Rehabilitation Potential: fair     Goal:* Patient will be compliant with initial home exercise program to take an active role in their rehabilitation process.      Goal:* Patient will demonstrate a good understanding of their condition and strategies for self-management.      LTG to be achieved in 4 weeks:  Goal:* Pt will have 40 pounds of  in the right hand to allow for functional grasp for all ADL activities including dressing, bathing and self care.      Goal:* Patient will have improved right lateral pinch strength of  6 pounds in order to perform fine motor tasks such as turning pages, turning ignition and open containers. Lety Eth:* Patient will have improved right tip pinch strength of 4 pounds in order to perform fine motor tasks such as zippiing up zippers or opening containers.      Goal:* Patient will have improved right dorothy pinch strength of 3 pounds in order to perform fine motor tasks such as writing.      Goal:* Patient will show a 10 point improvement on FOTO functional status measure to improve overall functional performance.     Frequency / Duration: Patient to be seen 1-2 times per week for 6 treatments:     Patient/ Caregiver education and instruction: Diagnosis, prognosis, self care and exercises  [x]  Plan of care has been reviewed with LATRICIA Deleon, OT, CHT, CLT 1/31/2018 11:11 AM

## 2018-02-01 NOTE — TELEPHONE ENCOUNTER
-called patient and left a voicemail which identified the patient. The patient was informed to check my chart for the requested letter and if she wanted it printed, call [phone] and the letter will be printed and put at the  of [facility] in order for her to pick it up.

## 2018-02-02 NOTE — PROGRESS NOTES
[facility]  [address]  [place], [address]  [phone] fax    Plan of Care/Statement of Necessity for Occupational Therapy Services    Patient name: Pavan Brooks Start of Care: 2018   Referral source: Naa Rollins MD : 1960    Medical Diagnosis: Brachial plexus disorders [G54.0]  Other symptoms and signs involving the musculoskeletal system [R29.898]   Onset Date:2017, 17-surgery    Treatment Diagnosis: right UE weakness   Prior Hospitalization: see medical history Provider#: [id number]   Medications: Verified on Patient summary List    Comorbidities: HTN, cervical stenosis, diabetes,    Prior Level of Function: Independent without limitations in ADL and IADL activities. The Plan of Care and following information is based on the information from the initial evaluation. Assessment/ key information:  Patient is a 62 y.o. female with a chief complaint  of weakness in the right hand since May of 2017. Patient reports right hand numbness and weakness, loss of intrinsic strength, dexterity. She continues to have consistent signs and symptoms of upper motor nerve compression in the right with minimal change in strength since last visit. She continues to demonstrate poor strength in the intrinsics of the hand in distributions from the median and ulnar nerves. Poor opposition and difficulty with holding objects.     Patient received an initial evaluation today followed by education as to diagnosis, precautions and treatment plan. Patient was provided with a home exercise program at initial evaluation on 10/2/17.     Evaluation Complexity: History LOW Complexity : Brief history review  Examination MEDIUM Complexity : 3-5 performance deficits relating to physical, cognitive , or psychosocial skils that result in activity limitations and / or participation restrictions Clinical Decision Making LOW Complexity : No comorbidities that affect functional and no verbal or physical assistance needed to complete eval tasks   Overall Complexity Rating: MEDIUM    Problem List: Decreased range of motion, Decreased strength, Decreased coordination/prehension and Decreased ADL/functional abilities      Treatment Plan may include any combination of the following: Therapeutic exercise, Neuromuscular re-education, Physical agent/modality, Manual therapy, Splinting/orthoses and Patient education    Patient / Family readiness to learn indicated by: asking questions and interest    Persons(s) to be included in education:   patient (P)    Barriers to Learning/Limitations: None    Patient Goal (s): improve hand strength    Patient Self Reported Health Status: good    Rehabilitation Potential: fair    Goal:* Patient will be compliant with initial home exercise program to take an active role in their rehabilitation process.      Goal:* Patient will demonstrate a good understanding of their condition and strategies for self-management.     LTG to be achieved in 4 weeks:  Goal:* Pt will have 40 pounds of  in the right hand to allow for functional grasp for all ADL activities including dressing, bathing and self care.      Goal:* Patient will have improved right lateral pinch strength of  6 pounds in order to perform fine motor tasks such as turning pages, turning ignition and open containers. Shelli Paz:* Patient will have improved right tip pinch strength of 4 pounds in order to perform fine motor tasks such as zippiing up zippers or opening containers.      Goal:* Patient will have improved right dorothy pinch strength of 3 pounds in order to perform fine motor tasks such as writing.      Goal:* Patient will show a 10 point improvement on FOTO functional status measure to improve overall functional performance.     Frequency / Duration: Patient to be seen 1-2 times per week for 6 treatments:    Patient/ Caregiver education and instruction: Diagnosis, prognosis, self care and exercises  [x]  Plan of care has been reviewed with Micheal Knight OT 1/31/2018 7:35 PM  ________________________________________________________________________    I certify that the above Therapy Services are being furnished while the patient is under my care. I agree with the treatment plan and certify that this therapy is necessary.     Physician's Signature:____________________  Date:____________Time:__________    Please sign and return to In [address]  [address]  [place] [address]  [phone] fax

## 2018-02-05 DIAGNOSIS — R29.898 WEAKNESS OF RIGHT HAND: ICD-10-CM

## 2018-02-08 ENCOUNTER — APPOINTMENT (OUTPATIENT)
Dept: PHYSICAL THERAPY | Age: 58
End: 2018-02-08

## 2018-02-09 NOTE — PROGRESS NOTES
[facility]  [address] [place] [address]  [place], [address]  [phone] fax    Occupational Therapy Discharge Summary    Patient name: Ainsley Sanchez Start of Care: 18   Referral source: Dunia Lantigua MD : 1960   Medical/Treatment Diagnosis: Brachial plexus disorders [G54.0]  Other symptoms and signs involving the musculoskeletal system [R29.898] Onset Date:2017     Prior Hospitalization: see medical history Provider#: [id number]   Medications: Verified on Patient Summary List    Comorbidities: HTN, cervical stenosis, diabetes,    Prior Level of Function: Independent without limitations in ADL and IADL activities. Visits from Start of Care: 1    Missed Visits: 0  Reporting Period : 18    Summary of Care:  Patient self DC secondary to no coverage with insurance. ASSESSMENT/RECOMMENDATIONS:  [x]Discontinue therapy: []Patient has reached or is progressing toward set goals      [x]Patient has abdicated secondary to insurance changes.       []Due to lack of appreciable progress towards set goals    Brunilda Spence OT 2018 1:20 PM

## 2018-02-12 ENCOUNTER — APPOINTMENT (OUTPATIENT)
Dept: PHYSICAL THERAPY | Age: 58
End: 2018-02-12

## 2018-02-13 ENCOUNTER — TELEPHONE (OUTPATIENT)
Dept: ORTHOPEDIC SURGERY | Age: 58
End: 2018-02-13

## 2018-02-13 NOTE — TELEPHONE ENCOUNTER
Patients spouse, Ivan Quintana (ok per HIPAA) called to advise that insurance denied the MRI recommended at last office visit due to lack of medical necessity. Please review to determine if we can get this test approved for patient as she is still experiencing same issues with hand. Ivan Quintana states insurance is responding to a letter from Dr. Kelsey Ocasio, however, no letter is in patients chart.   Patient can be reached at [phone], Ivan Quintana can be reached at [phone]

## 2018-02-14 ENCOUNTER — TELEPHONE (OUTPATIENT)
Dept: ORTHOPEDIC SURGERY | Age: 58
End: 2018-02-14

## 2018-02-14 ENCOUNTER — APPOINTMENT (OUTPATIENT)
Dept: PHYSICAL THERAPY | Age: 58
End: 2018-02-14

## 2018-02-14 NOTE — TELEPHONE ENCOUNTER
Spoke w/ Dr. Ekaterina Cain from [facility] and performed peer to peer for Right shoulder MRI to be approved. EMG +suggestive of brachial plexopathy. Shoulder MRI denied due to CPT code. Chest MRI w/ w/out contrast Approved. We should get approval faxed over from [place] today or tomorrow.

## 2018-02-15 DIAGNOSIS — G54.0 BRACHIAL PLEXOPATHY: Primary | ICD-10-CM

## 2018-02-15 DIAGNOSIS — R29.898 WEAKNESS OF RIGHT HAND: ICD-10-CM

## 2018-02-19 ENCOUNTER — TELEPHONE (OUTPATIENT)
Dept: ORTHOPEDIC SURGERY | Age: 58
End: 2018-02-19

## 2018-02-19 ENCOUNTER — APPOINTMENT (OUTPATIENT)
Dept: PHYSICAL THERAPY | Age: 58
End: 2018-02-19

## 2018-02-19 DIAGNOSIS — R29.898 WEAKNESS OF RIGHT HAND: ICD-10-CM

## 2018-02-19 DIAGNOSIS — G54.0 BRACHIAL PLEXOPATHY: Primary | ICD-10-CM

## 2018-02-21 ENCOUNTER — APPOINTMENT (OUTPATIENT)
Dept: PHYSICAL THERAPY | Age: 58
End: 2018-02-21

## 2018-02-22 ENCOUNTER — DOCUMENTATION ONLY (OUTPATIENT)
Dept: ORTHOPEDIC SURGERY | Age: 58
End: 2018-02-22

## 2018-02-22 ENCOUNTER — TELEPHONE (OUTPATIENT)
Dept: ORTHOPEDIC SURGERY | Age: 58
End: 2018-02-22

## 2018-02-22 NOTE — PROGRESS NOTES
Patient  Michelle Fried called in states he wanted to let the office know they did receive the voicemail for the appt on Monday 02/26/18 in [place].

## 2018-02-28 ENCOUNTER — DOCUMENTATION ONLY (OUTPATIENT)
Dept: ORTHOPEDIC SURGERY | Age: 58
End: 2018-02-28

## 2018-03-06 ENCOUNTER — OFFICE VISIT (OUTPATIENT)
Dept: ORTHOPEDIC SURGERY | Age: 58
End: 2018-03-06

## 2018-03-06 VITALS
TEMPERATURE: 98.1 F | BODY MASS INDEX: 34.97 KG/M2 | HEIGHT: 64 IN | HEART RATE: 70 BPM | OXYGEN SATURATION: 98 % | RESPIRATION RATE: 18 BRPM | SYSTOLIC BLOOD PRESSURE: 127 MMHG | DIASTOLIC BLOOD PRESSURE: 80 MMHG | WEIGHT: 204.8 LBS

## 2018-03-06 DIAGNOSIS — G54.0 BRACHIAL PLEXOPATHY: ICD-10-CM

## 2018-03-06 DIAGNOSIS — R29.898 WEAKNESS OF RIGHT HAND: ICD-10-CM

## 2018-03-06 DIAGNOSIS — Z98.1 S/P CERVICAL SPINAL FUSION: Primary | ICD-10-CM

## 2018-03-06 DIAGNOSIS — M48.02 CERVICAL STENOSIS OF SPINE: ICD-10-CM

## 2018-03-06 NOTE — LETTER
3/6/2018 10:03 AM 
 
Ms. Charley Marcelino [address] [place] [address] To Whom It May Concern: 
 
Charley Marcelino is currently under the care of [facility]. If there are questions or concerns please have the patient contact our office.  
 
 
 
Sincerely, 
 
 
 
 
 
Mickey Tejada MD

## 2018-03-06 NOTE — MR AVS SNAPSHOT
[address] [place] [address] [address] [place] [address] 
[phone] Patient: Slade Obrien MRN: [id number] XDH:4/3/7609 Visit Information Date & Time Provider Department Dept. Phone Encounter #  
 3/6/2018  Emi Osman MD [facility] [phone] [id number] Follow-up Instructions Return in about 3 months (around 6/6/2018). Upcoming Health Maintenance Date Due Hepatitis C Screening 1960 DTaP/Tdap/Td series (1 - Tdap) 4/8/1981 PAP AKA CERVICAL CYTOLOGY 4/8/1981 BREAST CANCER SCRN MAMMOGRAM 4/8/2010 FOBT Q 1 YEAR AGE 50-75 4/8/2010 Influenza Age 5 to Adult 8/1/2017 Allergies as of 3/6/2018  Review Complete On: 3/6/2018 By: Tristan Bamberger Severity Noted Reaction Type Reactions Aspirin Medium 01/16/2018    Shortness of Breath Percocet [Oxycodone-acetaminophen] Medium 01/16/2018    Rash Ibuprofen  08/04/2017    Shortness of Breath Seafood  08/30/2017    Nausea Only, Swelling Current Immunizations  Never Reviewed No immunizations on file. Not reviewed this visit You Were Diagnosed With   
  
 Codes Comments S/P cervical spinal fusion    -  Primary ICD-10-CM: Z98.1 ICD-9-CM: V45.4 Cervical stenosis of spine     ICD-10-CM: M48.02 
ICD-9-CM: 723.0 Weakness of right hand     ICD-10-CM: R29.898 ICD-9-CM: 728.87 Brachial plexopathy     ICD-10-CM: G54.0 ICD-9-CM: 353.0 Vitals BP Pulse Temp Resp Height(growth percentile) Weight(growth percentile) 127/80 70 98.1 °F (36.7 °C) (Oral) 18 5' 4\" (1.626 m) 204 lb 12.8 oz (92.9 kg) SpO2 BMI OB Status Smoking Status 98% 35.15 kg/m2 Postmenopausal Former Smoker BMI and BSA Data Body Mass Index Body Surface Area  
 35.15 kg/m 2 2.05 m 2 Preferred Pharmacy Pharmacy Name Phone  [address], [address] [phone] Your Updated Medication List  
  
   
This list is accurate as of 3/6/18 10:08 AM.  Always use your most recent med list.  
  
  
  
  
 esomeprazole 40 mg capsule Commonly known as:  Renford Maillard Take 40 mg by mouth.  
  
 gabapentin 100 mg capsule Commonly known as:  NEURONTIN Take 100 mg by mouth.  
  
 glimepiride 4 mg tablet Commonly known as:  AMARYL Take 4 mg by mouth. hydroCHLOROthiazide 25 mg tablet Commonly known as:  HYDRODIURIL Take 25 mg by mouth.  
  
 metFORMIN 1,000 mg tablet Commonly known as:  GLUCOPHAGE Take 1,000 mg by mouth.  
  
 promethazine 25 mg tablet Commonly known as:  PHENERGAN Take 25 mg by mouth every six (6) hours as needed for Nausea. simvastatin 10 mg tablet Commonly known as:  ZOCOR  
TAKE 1 TABLET BY MOUTH ONCE A DAY WITH DINNER  
  
 VITAMIN B-12 1,000 mcg tablet Generic drug:  cyanocobalamin Take 1,000 mcg by mouth daily. VITAMIN D3 1,000 unit tablet Generic drug:  cholecalciferol Take  by mouth daily. VITAMIN E-400 PO Take  by mouth. We Performed the Following AMB POC XRAY, SPINE, CERVICAL; 2 OR 3 [01063 CPT(R)] Follow-up Instructions Return in about 3 months (around 6/6/2018). Please provide this summary of care documentation to your next provider. Your primary care clinician is listed as Araceli Anne. If you have any questions after today's visit, please call [phone].

## 2018-03-06 NOTE — PROGRESS NOTES
[facility] [address], [address]  [place], [address] [place] [address] Street  Phone: [phone]  Fax: [phone]  PROGRESS NOTE  Patient: Brea Nguyen                MRN: [id number]       SSN: [id number]  YOB: 1960        AGE: 62 y.o. SEX: female  Body mass index is 35.15 kg/(m^2). PCP: Dorian Katz MD  03/06/18    Chief Complaint   Patient presents with    Neck Pain     MRI follow up       HISTORY OF PRESENT ILLNESS, RADIOGRAPHS, and PLAN:     HISTORY:  Ms. Rico Prabhakar returns today. She is five months out from her surgery. Her brachial plexus MRI, as recommended by Neurology, came back normal.  Part of the MRI that evaluated her cervical spine demonstrated resolution of the C7-T1 central stenosis with now only moderate foraminal stenosis, residual.      X-rays that I obtained in my office demonstrate fixation at C7-T1 with hopeful progression towards fusion. She has minimal neck pain and has improvement in the numbness and tingling in her hand, but she continues to have this intrinsic deformity in her hand with numbness in her hand, right hand weakness. The EMG that we had of her, they felt was consistent with a brachial plexopathy. ASSESSMENT/PLAN: My sense is that she is having continued issue probably from T1 if not T2 neuropathy, as well as C7 issues. I discussed with her the options at this point. Improvement in her symptoms following surgery would speak to watchful waiting. Progression of intrinsic weakness in her hands would speak to consideration of a multilevel posterior cervical decompression and fusion across the segments, C6-7, C7-T1, and T1-T2. My concern with such a surgery is that likely would create some significant cervicothoracic pain and discomfort, which was resolved with the surgery we did. At this point, we will see her back after some hand therapy in about three months time.  I am hesitant to want to proceed with surgery. I will obtain a CT scan at that point to confirm fusion, as plain x-rays, given her habitus are difficult to evaluate. Past Medical History:   Diagnosis Date    Diabetes ([facility]     High cholesterol     Hypertension        Family History   Problem Relation Age of Onset    Diabetes Mother     Diabetes Sister     Hypertension Sister     Diabetes Other     Hypertension Other        Current Outpatient Prescriptions   Medication Sig Dispense Refill    promethazine (PHENERGAN) 25 mg tablet Take 25 mg by mouth every six (6) hours as needed for Nausea.  simvastatin (ZOCOR) 10 mg tablet TAKE 1 TABLET BY MOUTH ONCE A DAY WITH DINNER      esomeprazole (NEXIUM) 40 mg capsule Take 40 mg by mouth.  gabapentin (NEURONTIN) 100 mg capsule Take 100 mg by mouth.  hydroCHLOROthiazide (HYDRODIURIL) 25 mg tablet Take 25 mg by mouth.  metFORMIN (GLUCOPHAGE) 1,000 mg tablet Take 1,000 mg by mouth.  glimepiride (AMARYL) 4 mg tablet Take 4 mg by mouth.  VITAMIN E-400 PO Take  by mouth.  cholecalciferol (VITAMIN D3) 1,000 unit tablet Take  by mouth daily.  cyanocobalamin (VITAMIN B-12) 1,000 mcg tablet Take 1,000 mcg by mouth daily. Allergies   Allergen Reactions    Aspirin Shortness of Breath    Percocet [Oxycodone-Acetaminophen] Rash    Ibuprofen Shortness of Breath    Seafood Nausea Only and Swelling       Past Surgical History:   Procedure Laterality Date    HX BREAST BIOPSY      HX CHOLECYSTECTOMY      HX HERNIA REPAIR         Past Medical History:   Diagnosis Date    Diabetes ([facility]     High cholesterol     Hypertension        Social History     Social History    Marital status: UNKNOWN     Spouse name: N/A    Number of children: N/A    Years of education: N/A     Occupational History    Not on file.      Social History Main Topics    Smoking status: Former Smoker    Smokeless tobacco: Never Used      Comment: quit 2008 (approx)    Alcohol use No  Drug use: No    Sexual activity: Not on file     Other Topics Concern    Not on file     Social History Narrative         REVIEW OF SYSTEMS:   CONSTITUTIONAL SYMPTOMS:  Negative. EYES:  Negative. EARS, NOSE, THROAT AND MOUTH:  Negative. CARDIOVASCULAR:  Negative. RESPIRATORY:  Negative. GENITOURINARY: Per HPI. GASTROINTESTINAL:  Per HPI. INTEGUMENTARY (SKIN AND/OR BREAST):  Negative. MUSCULOSKELETAL: Per HPI.   ENDOCRINE/RHEUMATOLOGIC:  Negative. NEUROLOGICAL:  Per HPI. HEMATOLOGIC/LYMPHATIC:  Negative. ALLERGIC/IMMUNOLOGIC:  Negative. PSYCHIATRIC:  Negative. PHYSICAL EXAMINATION:   Visit Vitals    /80    Pulse 70    Temp 98.1 °F (36.7 °C) (Oral)    Resp 18    Ht 5' 4\" (1.626 m)    Wt 204 lb 12.8 oz (92.9 kg)    SpO2 98%    BMI 35.15 kg/m2    PAIN SCALE: 0 - No pain/10    CONSTITUTIONAL: The patient is in no apparent distress and is alert and oriented x 3. HEENT: Normocephalic. Hearing grossly intact. NECK: Supple and symmetric. no tenderness, or masses were felt. RESPIRATORY: No labored breathing. CARDIOVASCULAR: The carotid pulses were normal. Peripheral pulses were 2+. CHEST: Normal AP diameter and normal contour without any kyphoscoliosis. LYMPHATIC: No lymphadenopathy was appreciated in the neck, axillae or groin. SKIN:  Negative for scars, rashes, lesions, or ulcers on the right upper, right lower, left upper, left lower and trunk. NEUROLOGICAL: Alert and oriented x 3. Ambulation without assistive device. FWB. EXTREMITIES: See musculoskeletal.  MUSCULOSKELETAL:   Head and Neck:  Negative for misalignment, asymmetry, crepitation, defects, tenderness masses or effusions.  Left Upper Extremity: Inspection, percussion and palpation preformed. Minors sign is negative.  Right Upper Extremity: Weakness. Inspection, percussion and palpation preformed. Minors sign is negative.  Spine, Ribs and Pelvis:  Inspection, percussion and palpation preformed. Negative for misalignment, asymmetry, crepitation, defects, tenderness masses or effusions.  Left Lower Extremity: Inspection, percussion and palpation preformed. Negative straight leg raise.  Right Lower Extremity: Inspection, percussion and palpation preformed. Negative straight leg raise. SPINE EXAM:     Cervical spine: Neck is midline. Normal muscle tone. No focal atrophy is noted. ASSESSMENT    ICD-10-CM ICD-9-CM    1. S/P cervical spinal fusion Z98.1 V45.4 AMB POC XRAY, SPINE, CERVICAL; 2 OR 3   2. Cervical stenosis of spine M48.02 723.0 AMB POC XRAY, SPINE, CERVICAL; 2 OR 3   3. Weakness of right hand R29.898 728.87 REFERRAL TO OCCUPATIONAL THERAPY   4. Brachial plexopathy G54.0 353.0        Written by Carroll Torres, as dictated by Andi Navarro MD.    I, Dr. Andi Navarro MD, confirm that all documentation is accurate.

## 2018-06-01 ENCOUNTER — TELEPHONE (OUTPATIENT)
Dept: ORTHOPEDIC SURGERY | Age: 58
End: 2018-06-01

## 2018-06-01 NOTE — TELEPHONE ENCOUNTER
Left voice mail for patient to schedule with NP. Please schedule with NP for 3mof/u instead of Francisco Bowden. After further review, patient needs to keep appointment with Dr. Francisco Bowden, per Jessie Dubon. Jessie Dubon informed patient to keep appointment as scheduled.

## 2018-06-05 ENCOUNTER — OFFICE VISIT (OUTPATIENT)
Dept: ORTHOPEDIC SURGERY | Age: 58
End: 2018-06-05

## 2018-06-05 VITALS
WEIGHT: 206 LBS | BODY MASS INDEX: 35.17 KG/M2 | DIASTOLIC BLOOD PRESSURE: 80 MMHG | HEART RATE: 70 BPM | SYSTOLIC BLOOD PRESSURE: 132 MMHG | HEIGHT: 64 IN

## 2018-06-05 DIAGNOSIS — R29.898 WEAKNESS OF RIGHT HAND: ICD-10-CM

## 2018-06-05 DIAGNOSIS — G54.0 BRACHIAL PLEXOPATHY: ICD-10-CM

## 2018-06-05 DIAGNOSIS — M48.02 CERVICAL SPINAL STENOSIS: Primary | ICD-10-CM

## 2018-06-05 DIAGNOSIS — Z98.1 S/P CERVICAL SPINAL FUSION: ICD-10-CM

## 2018-06-05 PROBLEM — E66.01 SEVERE OBESITY (BMI 35.0-39.9): Status: ACTIVE | Noted: 2018-06-05

## 2018-06-05 NOTE — PATIENT INSTRUCTIONS

## 2018-06-05 NOTE — PROGRESS NOTES
[facility] [address]  [address], [address]  [place]  Phone: [phone]  Fax: [phone]  PROGRESS NOTE  Patient: Janet Campa                MRN: [id number]       SSN: [id number]  YOB: 1960        AGE: 62 y.o. SEX: female  Body mass index is 35.36 kg/(m^2). PCP: Carol Stover MD  06/05/18    Chief Complaint   Patient presents with    Follow-up     cervical pain       HISTORY OF PRESENT ILLNESS, RADIOGRAPHS, and PLAN:     HISTORY OF PRESENT ILLNESS:  Ms. Iona Hanna returns today. She is about 9 months status post a cervical decompression and fusion. Her symptoms have dissipated greatly. She rates her pain as a 1. There is some intrinsic weakness in the right, but the brachial plexopathy appears to have dramatically resolved. Minimal neck pain. Minimal arm pain. RADIOGRAPHS:  X-rays demonstrate what appears to be a now a solid cervical fusion. ASSESSMENT/PLAN:  I think whatever her neuralgia was is resolving. At this point, I would continue her in an exercise program.  We will see her again in 3 months time for a final end-of-year evaluation of her cervical fusion, but I am pleased that she is continuing to resolve her course of neuralgia. cc: Carol Stover MD             Past Medical History:   Diagnosis Date    Diabetes ([facility]     High cholesterol     Hypertension        Family History   Problem Relation Age of Onset    Diabetes Mother     Diabetes Sister     Hypertension Sister     Diabetes Other     Hypertension Other        Current Outpatient Prescriptions   Medication Sig Dispense Refill    promethazine (PHENERGAN) 25 mg tablet Take 25 mg by mouth every six (6) hours as needed for Nausea.  simvastatin (ZOCOR) 10 mg tablet TAKE 1 TABLET BY MOUTH ONCE A DAY WITH DINNER      esomeprazole (NEXIUM) 40 mg capsule Take 40 mg by mouth.  gabapentin (NEURONTIN) 100 mg capsule Take 100 mg by mouth.       hydroCHLOROthiazide (HYDRODIURIL) 25 mg tablet Take 25 mg by mouth.  metFORMIN (GLUCOPHAGE) 1,000 mg tablet Take 1,000 mg by mouth.  glimepiride (AMARYL) 4 mg tablet Take 4 mg by mouth.  VITAMIN E-400 PO Take  by mouth.  cholecalciferol (VITAMIN D3) 1,000 unit tablet Take  by mouth daily.  cyanocobalamin (VITAMIN B-12) 1,000 mcg tablet Take 1,000 mcg by mouth daily. Allergies   Allergen Reactions    Aspirin Shortness of Breath    Percocet [Oxycodone-Acetaminophen] Rash    Ibuprofen Shortness of Breath    Seafood Nausea Only and Swelling       Past Surgical History:   Procedure Laterality Date    HX BREAST BIOPSY      HX CHOLECYSTECTOMY      HX HERNIA REPAIR         Past Medical History:   Diagnosis Date    Diabetes ([facility]     High cholesterol     Hypertension        Social History     Social History    Marital status: UNKNOWN     Spouse name: N/A    Number of children: N/A    Years of education: N/A     Occupational History    Not on file. Social History Main Topics    Smoking status: Former Smoker    Smokeless tobacco: Never Used      Comment: quit 2008 (approx)    Alcohol use No    Drug use: No    Sexual activity: Not on file     Other Topics Concern    Not on file     Social History Narrative         REVIEW OF SYSTEMS:   CONSTITUTIONAL SYMPTOMS:  Negative. EYES:  Negative. EARS, NOSE, THROAT AND MOUTH:  Negative. CARDIOVASCULAR:  Negative. RESPIRATORY:  Negative. GENITOURINARY: Per HPI. GASTROINTESTINAL:  Per HPI. INTEGUMENTARY (SKIN AND/OR BREAST):  Negative. MUSCULOSKELETAL: Per HPI.   ENDOCRINE/RHEUMATOLOGIC:  Negative. NEUROLOGICAL:  Per HPI. HEMATOLOGIC/LYMPHATIC:  Negative. ALLERGIC/IMMUNOLOGIC:  Negative. PSYCHIATRIC:  Negative.     PHYSICAL EXAMINATION:   Visit Vitals    /80    Pulse 70    Ht 5' 4\" (1.626 m)    Wt 206 lb (93.4 kg)    BMI 35.36 kg/m2    PAIN SCALE: 0 - No pain/10    CONSTITUTIONAL: The patient is in no apparent distress and is alert and oriented x 3. HEENT: Normocephalic. Hearing grossly intact. NECK: Supple and symmetric. no tenderness, or masses were felt. RESPIRATORY: No labored breathing. CARDIOVASCULAR: The carotid pulses were normal. Peripheral pulses were 2+. CHEST: Normal AP diameter and normal contour without any kyphoscoliosis. LYMPHATIC: No lymphadenopathy was appreciated in the neck, axillae or groin. SKIN:  Negative for scars, rashes, lesions, or ulcers on the right upper, right lower, left upper, left lower and trunk. NEUROLOGICAL: Alert and oriented x 3. Some imbalance. Ambulation without assistive device. FWB. EXTREMITIES: See musculoskeletal.  MUSCULOSKELETAL:   Head and Neck: Intermittent neck pain. Negative for misalignment, asymmetry, crepitation, defects, tenderness masses or effusions.  Left Upper Extremity: Inspection, percussion and palpation preformed. Minors sign is negative.  Right Upper Extremity: Intrinsic weakness. Inspection, percussion and palpation preformed. Minors sign is negative.  Spine, Ribs and Pelvis: Inspection, percussion and palpation preformed. Negative for misalignment, asymmetry, crepitation, defects, tenderness masses or effusions.  Left Lower Extremity: Inspection, percussion and palpation preformed. Negative straight leg raise.  Right Lower Extremity: Inspection, percussion and palpation preformed. Negative straight leg raise. SPINE EXAM:     Cervical spine: Neck is midline. Normal muscle tone. No focal atrophy is noted. ASSESSMENT    ICD-10-CM ICD-9-CM    1. Cervical spinal stenosis M48.02 723.0 AMB POC XRAY, SPINE, CERVICAL; 2 OR 3   2. S/P cervical spinal fusion Z98.1 V45.4 AMB POC XRAY, SPINE, CERVICAL; 2 OR 3   3. Brachial plexopathy G54.0 353.0 AMB POC XRAY, SPINE, CERVICAL; 2 OR 3   4.  Weakness of right hand R29.898 728.87 AMB POC XRAY, SPINE, CERVICAL; 2 OR 3       Written by Sanjay Nieves, [address] [place] [address], as dictated by Pennie Garcia MD.    I, Dr. Pennie Garcia MD, confirm that all documentation is accurate.

## 2018-06-05 NOTE — MR AVS SNAPSHOT
[address] [place] [address] [address] [facility] [address] 
[phone] Patient: Pratik Gillespie MRN: [id number] MHE:1/0/8692 Visit Information Date & Time Provider Department Dept. Phone Encounter #  
 6/5/2018  1:15 PM Lucie Brown MD [facility] [phone] [id number] Follow-up Instructions Return in about 3 months (around 9/5/2018). Upcoming Health Maintenance Date Due Hepatitis C Screening 1960 DTaP/Tdap/Td series (1 - Tdap) 4/8/1981 PAP AKA CERVICAL CYTOLOGY 4/8/1981 BREAST CANCER SCRN MAMMOGRAM 4/8/2010 FOBT Q 1 YEAR AGE 50-75 4/8/2010 Influenza Age 5 to Adult 8/1/2018 Allergies as of 6/5/2018  Review Complete On: 6/5/2018 By: Lucie Brown MD  
  
 Severity Noted Reaction Type Reactions Aspirin Medium 01/16/2018    Shortness of Breath Percocet [Oxycodone-acetaminophen] Medium 01/16/2018    Rash Ibuprofen  08/04/2017    Shortness of Breath Seafood  08/30/2017    Nausea Only, Swelling Current Immunizations  Never Reviewed No immunizations on file. Not reviewed this visit You Were Diagnosed With   
  
 Codes Comments Cervical spinal stenosis    -  Primary ICD-10-CM: M48.02 
ICD-9-CM: 723.0 S/P cervical spinal fusion     ICD-10-CM: Z98.1 ICD-9-CM: V45.4 Brachial plexopathy     ICD-10-CM: G54.0 ICD-9-CM: 353.0 Weakness of right hand     ICD-10-CM: R29.898 ICD-9-CM: 728.87 Vitals BP Pulse Height(growth percentile) Weight(growth percentile) BMI OB Status 132/80 70 5' 4\" (1.626 m) 206 lb (93.4 kg) 35.36 kg/m2 Postmenopausal  
 Smoking Status Former Smoker Vitals History BMI and BSA Data Body Mass Index Body Surface Area  
 35.36 kg/m 2 2.05 m 2 Preferred Pharmacy Pharmacy Name Phone [address], [address] [phone] Your Updated Medication List  
  
   
This list is accurate as of 6/5/18  1:40 PM.  Always use your most recent med list.  
  
  
  
  
 esomeprazole 40 mg capsule Commonly known as:  Juanetta Budds Take 40 mg by mouth.  
  
 gabapentin 100 mg capsule Commonly known as:  NEURONTIN Take 100 mg by mouth.  
  
 glimepiride 4 mg tablet Commonly known as:  AMARYL Take 4 mg by mouth. hydroCHLOROthiazide 25 mg tablet Commonly known as:  HYDRODIURIL Take 25 mg by mouth.  
  
 metFORMIN 1,000 mg tablet Commonly known as:  GLUCOPHAGE Take 1,000 mg by mouth.  
  
 promethazine 25 mg tablet Commonly known as:  PHENERGAN Take 25 mg by mouth every six (6) hours as needed for Nausea. simvastatin 10 mg tablet Commonly known as:  ZOCOR  
TAKE 1 TABLET BY MOUTH ONCE A DAY WITH DINNER  
  
 VITAMIN B-12 1,000 mcg tablet Generic drug:  cyanocobalamin Take 1,000 mcg by mouth daily. VITAMIN D3 1,000 unit tablet Generic drug:  cholecalciferol Take  by mouth daily. VITAMIN E-400 PO Take  by mouth. We Performed the Following AMB POC XRAY, SPINE, CERVICAL; 2 OR 3 [19439 CPT(R)] Follow-up Instructions Return in about 3 months (around 9/5/2018). Patient Instructions Preventing Falls: Care Instructions Your Care Instructions Getting around your home safely can be a challenge if you have injuries or health problems that make it easy for you to fall. Loose rugs and furniture in walkways are among the dangers for many older people who have problems walking or who have poor eyesight. People who have conditions such as arthritis, osteoporosis, or dementia also have to be careful not to fall. You can make your home safer with a few simple measures. Follow-up care is a key part of your treatment and safety.  Be sure to make and go to all appointments, and call your doctor if you are having problems. It's also a good idea to know your test results and keep a list of the medicines you take. How can you care for yourself at home? Taking care of yourself · You may get dizzy if you do not drink enough water. To prevent dehydration, drink plenty of fluids, enough so that your urine is light yellow or clear like water. Choose water and other caffeine-free clear liquids. If you have kidney, heart, or liver disease and have to limit fluids, talk with your doctor before you increase the amount of fluids you drink. · Exercise regularly to improve your strength, muscle tone, and balance. Walk if you can. Swimming may be a good choice if you cannot walk easily. · Have your vision and hearing checked each year or any time you notice a change. If you have trouble seeing and hearing, you might not be able to avoid objects and could lose your balance. · Know the side effects of the medicines you take. Ask your doctor or pharmacist whether the medicines you take can affect your balance. Sleeping pills or sedatives can affect your balance. · Limit the amount of alcohol you drink. Alcohol can impair your balance and other senses. · Ask your doctor whether calluses or corns on your feet need to be removed. If you wear loose-fitting shoes because of calluses or corns, you can lose your balance and fall. · Talk to your doctor if you have numbness in your feet. Preventing falls at home · Remove raised doorway thresholds, throw rugs, and clutter. Repair loose carpet or raised areas in the floor. · Move furniture and electrical cords to keep them out of walking paths. · Use nonskid floor wax, and wipe up spills right away, especially on ceramic tile floors. · If you use a walker or cane, put rubber tips on it. If you use crutches, clean the bottoms of them regularly with an abrasive pad, such as steel wool.  
· Keep your house well lit, especially [address], and outside walkways. Use night-lights in areas such as hallways and bathrooms. Add extra light switches or use remote switches (such as switches that go on or off when you clap your hands) to make it easier to turn lights on if you have to get up during the night. · Install sturdy handrails on stairways. · Move items in your cabinets so that the things you use a lot are on the lower shelves (about waist level). · Keep a cordless phone and a flashlight with new batteries by your bed. If possible, put a phone in each of the main rooms of your house, or carry a cell phone in case you fall and cannot reach a phone. Or, you can wear a device around your neck or wrist. You push a button that sends a signal for help. · Wear low-heeled shoes that fit well and give your feet good support. Use footwear with nonskid soles. Check the heels and soles of your shoes for wear. Repair or replace worn heels or soles. · Do not wear socks without shoes on wood floors. · Walk on the grass when the sidewalks are slippery. If you live in an area that gets snow and ice in the winter, sprinkle salt on slippery steps and sidewalks. Preventing falls in the bath · Install grab bars and nonskid mats inside and outside your shower or tub and near the toilet and sinks. · Use shower chairs and bath benches. · Use a hand-held shower head that will allow you to sit while showering. · Get into a tub or shower by putting the weaker leg in first. Get out of a tub or shower with your strong side first. 
· Repair loose toilet seats and consider installing a raised toilet seat to make getting on and off the toilet easier. · Keep your bathroom door unlocked while you are in the shower. Where can you learn more? Go to http://denzel-tor.info/. Enter [phone] in the search box to learn more about \"Preventing Falls: Care Instructions. \" Current as of: May 12, 2017 Content Version: 11.4 © 9653-9211 [employer]. Care instructions adapted under license by [employer] (which disclaims liability or warranty for this information). If you have questions about a medical condition or this instruction, always ask your healthcare professional. [address] any warranty or liability for your use of this information. Please provide this summary of care documentation to your next provider. Your primary care clinician is listed as Efren Lopez. If you have any questions after today's visit, please call [phone].

## 2018-09-05 ENCOUNTER — OFFICE VISIT (OUTPATIENT)
Dept: ORTHOPEDIC SURGERY | Age: 58
End: 2018-09-05

## 2018-09-05 VITALS
DIASTOLIC BLOOD PRESSURE: 84 MMHG | OXYGEN SATURATION: 100 % | SYSTOLIC BLOOD PRESSURE: 136 MMHG | TEMPERATURE: 98.8 F | BODY MASS INDEX: 36.02 KG/M2 | HEIGHT: 64 IN | RESPIRATION RATE: 16 BRPM | WEIGHT: 211 LBS | HEART RATE: 73 BPM

## 2018-09-05 DIAGNOSIS — M54.2 CERVICAL PAIN: ICD-10-CM

## 2018-09-05 DIAGNOSIS — Z98.1 S/P CERVICAL SPINAL FUSION: ICD-10-CM

## 2018-09-05 DIAGNOSIS — M48.02 CERVICAL STENOSIS OF SPINE: Primary | ICD-10-CM

## 2018-09-05 NOTE — MR AVS SNAPSHOT
[address] [place] [address] [address] [facility] [address] 
[phone] Patient: Anabella Fowler MRN: [id number] Visit Information Date & Time Provider Department Dept. Phone Encounter #  
 2018  1:00 PM Deborah Glynn NP [facility] [phone] [id number] Follow-up Instructions Return in about 3 months (around 2018) for jose. Follow-up and Disposition History Upcoming Health Maintenance Date Due Hepatitis C Screening 1960 DTaP/Tdap/Td series (1 - Tdap) 1981 PAP AKA CERVICAL CYTOLOGY 1981 BREAST CANCER SCRN MAMMOGRAM 2010 FOBT Q 1 YEAR AGE 50-75 2010 Influenza Age 5 to Adult 2018 Allergies as of 2018  Review Complete On: 2018 By: Shorty Sims MD  
  
 Severity Noted Reaction Type Reactions Aspirin Medium 2018    Shortness of Breath Percocet [Oxycodone-acetaminophen] Medium 2018    Rash Ibuprofen  2017    Shortness of Breath Seafood  2017    Nausea Only, Swelling Current Immunizations  Never Reviewed No immunizations on file. Not reviewed this visit You Were Diagnosed With   
  
 Codes Comments Cervical stenosis of spine    -  Primary ICD-10-CM: M48.02 
ICD-9-CM: 723.0 S/P cervical spinal fusion     ICD-10-CM: Z98.1 ICD-9-CM: V45.4 Cervical pain     ICD-10-CM: M54.2 ICD-9-CM: 723.1 Vitals BP Pulse Temp Resp Height(growth percentile) Weight(growth percentile) 136/84 73 98.8 °F (37.1 °C) 16 5' 4\" (1.626 m) 211 lb (95.7 kg) SpO2 BMI OB Status Smoking Status 100% 36.22 kg/m2 Postmenopausal Former Smoker BMI and BSA Data Body Mass Index Body Surface Area  
 36.22 kg/m 2 2.08 m 2 Preferred Pharmacy Pharmacy Name Phone [address], [facility] [phone] Your Updated Medication List  
  
   
This list is accurate as of 9/5/18  1:37 PM.  Always use your most recent med list.  
  
  
  
  
 esomeprazole 40 mg capsule Commonly known as:  Shell Saldana Take 40 mg by mouth.  
  
 gabapentin 100 mg capsule Commonly known as:  NEURONTIN Take 100 mg by mouth.  
  
 glimepiride 4 mg tablet Commonly known as:  AMARYL Take 4 mg by mouth. hydroCHLOROthiazide 25 mg tablet Commonly known as:  HYDRODIURIL Take 25 mg by mouth.  
  
 metFORMIN 1,000 mg tablet Commonly known as:  GLUCOPHAGE Take 1,000 mg by mouth.  
  
 promethazine 25 mg tablet Commonly known as:  PHENERGAN Take 25 mg by mouth every six (6) hours as needed for Nausea. simvastatin 10 mg tablet Commonly known as:  ZOCOR  
TAKE 1 TABLET BY MOUTH ONCE A DAY WITH DINNER  
  
 VITAMIN B-12 1,000 mcg tablet Generic drug:  cyanocobalamin Take 1,000 mcg by mouth daily. VITAMIN D3 1,000 unit tablet Generic drug:  cholecalciferol Take  by mouth daily. VITAMIN E-400 PO Take  by mouth. We Performed the Following AMB POC XRAY, SPINE, CERVICAL; 2 OR 3 [25336 CPT(R)] REFERRAL TO PHYSICAL THERAPY [ZLA19 Custom] Comments:  
 Right hand weakness/ intrinsic weakness Wants to go near [place] Follow-up Instructions Return in about 3 months (around 12/5/2018) for jose. Referral Information Referral ID Referred By Referred To  
  
 [id number] Jared HAHN Not Available Visits Status Start Date End Date 1 New Request 9/5/18 9/5/19 If your referral has a status of pending review or denied, additional information will be sent to support the outcome of this decision. Introducing [facility]! Dear Latoya Dumont: Thank you for requesting a [employer] account. Our records indicate that you have previously registered for a [employer] account but its currently inactive. Please call our [employer] support line at [phone]. Additional Information If you have questions, please visit the Frequently Asked Questions section of the [employer] website at [email or web address] Remember, [employer] is NOT to be used for urgent needs. For medical emergencies, dial 911. Now available from your iPhone and Android! Please provide this summary of care documentation to your next provider. Your primary care clinician is listed as Lucien Party. If you have any questions after today's visit, please call [phone].

## 2018-09-05 NOTE — PROGRESS NOTES
[address]  [address]  [address]  Phone: [phone]  Fax: [phone]    Maia Benedict  : 1960  PCP: Dwaine Peterson MD    PROGRESS NOTE    HISTORY OF PRESENT ILLNESS:  Chief Complaint   Patient presents with    Neck Pain    Follow-up     3 MO      Cyrus Diaz is a 62 y.o.  female with history of a cervical decompression and fusion about 1 year ago. She last saw Dr. Adelso Sherman. Her symptoms had dissipated greatly. She was having some mild right intrinsics weakness but the brachial plexopathy had resolved. pain. She was to continue a HEP. X-rays demonstrated what appears to be a now a solid cervical fusion. Today, she has mostly good days. She denies pain for the most part. She denies arm pain but she does have some intermittent stiffness to her right hand. This started about 2 weeks ago. It sounds like she may be describing a trigger finger. She does have some weakness in her right intrinsics. She continues to have this intrinsic deformity in her hand with numbness in her hand, right hand weakness. She has completed hand PT but states she could not perform this due to trigger fingers. Dr. Adelso Sherman mentioned in a previous note \"My sense is that she is having continued issue probably from T1 if not T2 neuropathy, as well as C7 issues. I discussed with her the options at this point. Improvement in her symptoms following surgery would speak to watchful waiting. Progression of intrinsic weakness in her hands would speak to consideration of a multilevel posterior cervical decompression and fusion across the segments, C6-7, C7-T1, and T1-T2. My concern with such a surgery is that likely would create some significant cervicothoracic pain and discomfort, which was resolved with the surgery we did. \"     The EMG that we had of her, they felt was consistent with a brachial plexopathy.       Denies bladder/bowel dysfunction, saddle paresthesia, weakness, gait disturbance, or other neurological deficit. ASSESSMENT  62 y.o. female with cervical pain. Diagnoses and all orders for this visit:    1. Cervical stenosis of spine  -     AMB POC XRAY, SPINE, CERVICAL; 2 OR 3  -     REFERRAL TO PHYSICAL THERAPY    2. S/P cervical spinal fusion  -     AMB POC XRAY, SPINE, CERVICAL; 2 OR 3  -     REFERRAL TO PHYSICAL THERAPY    3. Cervical pain  -     AMB POC XRAY, SPINE, CERVICAL; 2 OR 3  -     REFERRAL TO PHYSICAL THERAPY       IMPRESSION/PLAN    1) Pt was given information on cervical care. 2) Referral to R hand PT. For hand and intrinsic weakness. I do not believe she has completed hand PT yet. We will do this for hand stretching and see her back with Dr. Dedra Do. 3) x-rays obtained today, appropriate alignment and fixation. Final interpretation will be scanned in after Dr. Dedra Do review. 4) Ms. Deanne Marmolejo has a reminder for a \"due or due soon\" health maintenance. I have asked that she contact her primary care provider, Awa Valderrama MD, for follow-up on this health maintenance. 5) We have informed patient to notify us for immediate appointment if he has any worsening neurogical symptoms or if an emergency situation presents, then call 911  6) Pt will follow-up in Dr. Dedra Do in 3 months for PT FU. He had discussed a CT in the past.     Risks and benefits of ongoing opiate therapy have been reviewed with the patient.  is appropriate. PAST MEDICAL HISTORY  Past Medical History:   Diagnosis Date    Diabetes ([facility] [address]     High cholesterol     Hypertension         MEDICATIONS  Current Outpatient Prescriptions   Medication Sig Dispense Refill    promethazine (PHENERGAN) 25 mg tablet Take 25 mg by mouth every six (6) hours as needed for Nausea.  simvastatin (ZOCOR) 10 mg tablet TAKE 1 TABLET BY MOUTH ONCE A DAY WITH DINNER      esomeprazole (NEXIUM) 40 mg capsule Take 40 mg by mouth.       gabapentin (NEURONTIN) 100 mg capsule Take 100 mg by mouth.  hydroCHLOROthiazide (HYDRODIURIL) 25 mg tablet Take 25 mg by mouth.  metFORMIN (GLUCOPHAGE) 1,000 mg tablet Take 1,000 mg by mouth.  glimepiride (AMARYL) 4 mg tablet Take 4 mg by mouth.  VITAMIN E-400 PO Take  by mouth.  cholecalciferol (VITAMIN D3) 1,000 unit tablet Take  by mouth daily.  cyanocobalamin (VITAMIN B-12) 1,000 mcg tablet Take 1,000 mcg by mouth daily. ALLERGIES  Allergies   Allergen Reactions    Aspirin Shortness of Breath    Percocet [Oxycodone-Acetaminophen] Rash    Ibuprofen Shortness of Breath    Seafood Nausea Only and Swelling       SOCIAL HISTORY    Social History     Social History    Marital status: UNKNOWN     Spouse name: N/A    Number of children: N/A    Years of education: N/A     Occupational History    Not on file. Social History Main Topics    Smoking status: Former Smoker    Smokeless tobacco: Never Used      Comment: quit 2008 (approx)    Alcohol use No    Drug use: No    Sexual activity: Not on file     Other Topics Concern    Not on file     Social History Narrative       SUBJECTIVE      Pain Scale: 0 - No pain/10    Pain Assessment  6/5/2018   Location of Pain Neck   Pain Location Comment -   Location Modifiers -   Severity of Pain 0   Quality of Pain Aching   Quality of Pain Comment -   Duration of Pain -   Frequency of Pain Intermittent   Aggravating Factors -   Aggravating Factors Comment -   Limiting Behavior No   Relieving Factors Rest   Relieving Factors Comment -   Result of Injury No       Accompanied by self. REVIEW OF SYSTEMS  ROS    Constitutional: Negative for fever, chills, or weight change. Respiratory: Negative for cough or shortness of breath. Cardiovascular: Negative for chest pain or palpitations. Gastrointestinal: Negative for acid reflux, change in bowel habits, or constipation. Genitourinary: Negative for incontinence, dysuria and flank pain.    Musculoskeletal: Positive for cervical pain. Skin: Negative for rash. Neurological: Negative for headaches, dizziness, or numbness. Endo/Heme/Allergies: Negative . Psychiatric/Behavioral: Negative. PHYSICAL EXAMINATION  Visit Vitals    /84    Pulse 73    Temp 98.8 °F (37.1 °C)    Resp 16    Ht 5' 4\" (1.626 m)    Wt 211 lb (95.7 kg)    SpO2 100%    BMI 36.22 kg/m2       Constitutional: Well developed,  well nourished,  awake, alert, and in no acute distress. Neurological:  Sensation to light touch is intact. Psychiatric: Affect and mood are appropriate. Integumentary: No rashes or abrasions noted on exposed areas,  warm, dry and intact. Cardiovascular/Peripheral Vascular:  No peripheral edema is noted. Lymphatic:  No evidence of lymphedema. No cervical lymphadenopathy. SPINE/MUSCULOSKELETAL EXAM    Cervical spine:  Neck is midline. Normal muscle tone. No focal atrophy is noted. Shoulder ROM intact. Tenderness to palpation to cervical spine. Negative Spurling's sign. Negative Tinel's sign. Negative Minor's sign. MOTOR    Biceps  Triceps Deltoids Wrist Ext Wrist Flex Hand Intrin   Right +4/5 +4/5 +4/5 +4/5 +4/5 3/5   Left +4/5 +4/5 +4/5 +4/5 +4/5 +4/5     normal gait and station    Ambulation without assistive device. full weight bearing, non-antalgic gait.     Eleanor Lucero NP

## 2018-09-10 ENCOUNTER — TELEPHONE (OUTPATIENT)
Dept: ORTHOPEDIC SURGERY | Age: 58
End: 2018-09-10

## 2018-09-10 NOTE — TELEPHONE ENCOUNTER
-called patient about the letter and she would like it faxed, but she is getting the fax number and stated that she will call back and provide the information.    -waiting on fax number from patient

## 2018-09-10 NOTE — TELEPHONE ENCOUNTER
Patient called to request a letter from Dr. Dhara Lechuga stating that it's ok for her to return to work as a . Please advise patient when ready at [phone] (wk) or [phone].

## 2018-09-17 ENCOUNTER — TELEPHONE (OUTPATIENT)
Dept: ORTHOPEDIC SURGERY | Age: 58
End: 2018-09-17

## 2018-12-04 ENCOUNTER — OFFICE VISIT (OUTPATIENT)
Dept: ORTHOPEDIC SURGERY | Age: 58
End: 2018-12-04

## 2018-12-04 VITALS
RESPIRATION RATE: 14 BRPM | OXYGEN SATURATION: 96 % | WEIGHT: 213.4 LBS | HEART RATE: 74 BPM | DIASTOLIC BLOOD PRESSURE: 82 MMHG | BODY MASS INDEX: 36.43 KG/M2 | SYSTOLIC BLOOD PRESSURE: 135 MMHG | HEIGHT: 64 IN | TEMPERATURE: 98.5 F

## 2018-12-04 DIAGNOSIS — M48.02 CERVICAL STENOSIS OF SPINE: Primary | ICD-10-CM

## 2018-12-04 DIAGNOSIS — Z98.1 S/P CERVICAL SPINAL FUSION: ICD-10-CM

## 2018-12-04 NOTE — PROGRESS NOTES
[facility] [address] 
[address], [address] Phone: [phone] Fax: [phone] PROGRESS NOTE Patient: Manas Choi                MRN: [id number]       SSN: [id number] YOB: 1960        AGE: 62 y.o. SEX: female Body mass index is 36.63 kg/m². PCP: Stephanie Norton MD 
12/04/18 Chief Complaint Patient presents with  Neck Pain  Follow-up HISTORY OF PRESENT ILLNESS, RADIOGRAPHS, and PLAN:  
 
HISTORY OF PRESENT ILLNESS:  Ms. Jarad Lara returns today. She is a year and 4 months out from her surgery. She is having steady improvements from what appears to have been a brachial plexopathy, as noted on her EMG. She is improving with therapy. The pain has dissipated in her arm. Her symptoms are now solely in her right hand. She has minimal-to-no neck pain. She rates her overall symptoms at about a 3. She is continuing to get PT. ASSESSMENT/PLAN:  At this time, I do not see any role for further surgery. She is improving and is content with her current slow improvements and the role of therapy, and to consider surgical intervention, it is uncertain whether or not it would be helpful at all and certainly engender considerable pain, having to extend her fusion across the cervicothoracic junction. I will have her follow up with my nurse practitioners for routine followup in a 4-month interval. 
 
cc:  Dr. Aubrey Saab Past Medical History:  
Diagnosis Date  Diabetes ([facility] [address]  High cholesterol  Hypertension Family History Problem Relation Age of Onset  Diabetes Mother  Diabetes Sister  Hypertension Sister  Diabetes Other  Hypertension Other Current Outpatient Medications Medication Sig Dispense Refill  promethazine (PHENERGAN) 25 mg tablet Take 25 mg by mouth every six (6) hours as needed for Nausea.  simvastatin (ZOCOR) 10 mg tablet TAKE 1 TABLET BY MOUTH ONCE A DAY WITH DINNER    
 esomeprazole (NEXIUM) 40 mg capsule Take 40 mg by mouth.  hydroCHLOROthiazide (HYDRODIURIL) 25 mg tablet Take 25 mg by mouth.  metFORMIN (GLUCOPHAGE) 1,000 mg tablet Take 1,000 mg by mouth.  glimepiride (AMARYL) 4 mg tablet Take 4 mg by mouth.  VITAMIN E-400 PO Take  by mouth.  cholecalciferol (VITAMIN D3) 1,000 unit tablet Take  by mouth daily.  cyanocobalamin (VITAMIN B-12) 1,000 mcg tablet Take 1,000 mcg by mouth daily.  gabapentin (NEURONTIN) 100 mg capsule Take 100 mg by mouth. Allergies Allergen Reactions  Aspirin Shortness of Breath  Percocet [Oxycodone-Acetaminophen] Rash  Ibuprofen Shortness of Breath  Seafood Nausea Only and Swelling Past Surgical History:  
Procedure Laterality Date  HX BREAST BIOPSY  HX CHOLECYSTECTOMY  HX HERNIA REPAIR Past Medical History:  
Diagnosis Date  Diabetes ([facility]  High cholesterol  Hypertension Social History Socioeconomic History  Marital status: UNKNOWN Spouse name: Not on file  Number of children: Not on file  Years of education: Not on file  Highest education level: Not on file Social Needs  Financial resource strain: Not on file  Food insecurity - worry: Not on file  Food insecurity - inability: Not on file  Transportation needs - medical: Not on file  Transportation needs - non-medical: Not on file Occupational History  Not on file Tobacco Use  Smoking status: Former Smoker  Smokeless tobacco: Never Used  Tobacco comment: quit 2008 (approx) Substance and Sexual Activity  Alcohol use: No  
 Drug use: No  
 Sexual activity: Not on file Other Topics Concern  Not on file Social History Narrative  Not on file REVIEW OF SYSTEMS: 
 CONSTITUTIONAL SYMPTOMS:  Negative. EYES:  Negative. EARS, NOSE, THROAT AND MOUTH:  Negative. CARDIOVASCULAR:  Negative. RESPIRATORY:  Negative. GENITOURINARY: Per HPI. GASTROINTESTINAL:  Per HPI. INTEGUMENTARY (SKIN AND/OR BREAST):  Negative. MUSCULOSKELETAL: Per HPI. ENDOCRINE/RHEUMATOLOGIC:  Negative. NEUROLOGICAL:  Per HPI. HEMATOLOGIC/LYMPHATIC:  Negative. ALLERGIC/IMMUNOLOGIC:  Negative. PSYCHIATRIC:  Negative. PHYSICAL EXAMINATION:  
Visit Vitals /82 Pulse 74 Temp 98.5 °F (36.9 °C) (Oral) Resp 14 Ht 5' 4\" (1.626 m) Wt 213 lb 6.4 oz (96.8 kg) SpO2 96% BMI 36.63 kg/m² PAIN SCALE: 3/10 CONSTITUTIONAL: The patient is in no apparent distress and is alert and oriented x 3. HEENT: Normocephalic. Hearing grossly intact. NECK: Supple and symmetric. no tenderness, or masses were felt. RESPIRATORY: No labored breathing. CARDIOVASCULAR: The carotid pulses were normal. Peripheral pulses were 2+. CHEST: Normal AP diameter and normal contour without any kyphoscoliosis. LYMPHATIC: No lymphadenopathy was appreciated in the neck, axillae or groin. SKIN:  Negative for scars, rashes, lesions, or ulcers on the right upper, right lower, left upper, left lower and trunk. NEUROLOGICAL: Alert and oriented x 3. Ambulation without assistive device. FWB. EXTREMITIES: See musculoskeletal. 
MUSCULOSKELETAL: 
? Head and Neck:  Negative for misalignment, asymmetry, crepitation, defects, tenderness masses or effusions. ? Left Upper Extremity: Inspection, percussion and palpation performed. Minors sign is negative. ? Right Upper Extremity: Numbness in hand. Inspection, percussion and palpation performed. Minors sign is negative. ? Spine, Ribs and Pelvis: Inspection, percussion and palpation performed. Negative for misalignment, asymmetry, crepitation, defects, tenderness masses or effusions. ? Left Lower Extremity: Inspection, percussion and palpation performed. Negative straight leg raise. ? Right Lower Extremity: Inspection, percussion and palpation performed. Negative straight leg raise. SPINE EXAM:  
 
Cervical spine: Neck is midline. Normal muscle tone. No focal atrophy is noted. ASSESSMENT 
  ICD-10-CM ICD-9-CM 1. Cervical stenosis of spine M48.02 723.0 2. S/P cervical spinal fusion Z98.1 V45.4 Written by Autumn Perez, as dictated by Isaiah Moe MD. 
 
I, Dr. Isaiah Moe MD, confirm that all documentation is accurate.

## 2023-10-25 NOTE — PERIOP NOTES
OPERATIVE REPORT     Name:  Nel Terry   : 1966   MRN: [id number]       PRE-OP DIAGNOSIS:   1. Overactive Bladder  2. Urge urinary incontinence  3. >50% improvement in symptoms after basic sacral neuromodulation nerve test             POST-OP DIAGNOSIS:   1. Overactive Bladder  2. Urge urinary incontinence  3. >50% improvement in symptoms after basic sacral neuromodulation nerve test              PROCEDURE:    - Insertion of sacral neurostimulator electrode array under fluoroscopic guidance (34328)   - Insertion of neurostimulator pulse generator (72263)              SURGEON:   Surgeon(s) and Role:     * Edward Bailey M.D. - Primary            ANESTHESIA: General            FINDINGS:   Confirmed fluoroscopic placement of leads into the right S3 foramen in correct positioning. The patient had appropriate responses with bellowing of the perineum and plantarflexion of the big toe. Normal impedances upon connection to IPG.      ESTIMATED BLOOD LOSS: Minimal            DRAINS: None            SPECIMENS: None            IMPLANTS: [employer] IPG, nonrechargeable            COMPLICATIONS: none            DISPOSITION: discharge            CONDITION: stable            INDICATION FOR SURGERY:     Nel Terry is a 57 y.o. year old P2 with overactive bladder and urge urinary incontinence. She underwent office basic nerve elvaulation with [employer] sacral neuromodulation system, and experiences significant improvement in symptoms with >50% improvement in her urinary frequency and urge leaks. She desires full implant of system and IPG today. Risks, benefits, and alternative reviewed including infection, pain at implant site, variation in symptoms after implant.  She has opted for the nonrechargeable IPG. She will sometimes need to reprogram occasionally with the assistance of the [employer] rep.  She should avoid heavy lifting and significant exercise until her 2 week incision check. Consent was signed and witnessed  TRANSFER - OUT REPORT:    Verbal report given to Liane Marquis RN(name) on [facility]  being transferred to [address] [place] [address](unit) for routine post - op       Report consisted of patients Situation, Background, Assessment and   Recommendations(SBAR). Information from the following report(s) SBAR, OR Summary, Procedure Summary, Intake/Output and MAR was reviewed with the receiving nurse. Lines:   Peripheral IV 08/30/17 Right Wrist (Active)   Site Assessment Clean, dry, & intact 8/30/2017 11:00 AM   Phlebitis Assessment 0 8/30/2017 11:00 AM   Infiltration Assessment 0 8/30/2017 11:00 AM   Dressing Status Clean, dry, & intact 8/30/2017 11:00 AM   Dressing Type Tape;Transparent 8/30/2017 11:00 AM   Hub Color/Line Status Pink; Infusing 8/30/2017 11:00 AM       Peripheral IV 08/30/17 Left Forearm (Active)   Site Assessment Clean, dry, & intact 8/30/2017 11:00 AM   Phlebitis Assessment 0 8/30/2017 11:00 AM   Infiltration Assessment 0 8/30/2017 11:00 AM   Dressing Status Clean, dry, & intact 8/30/2017 11:00 AM   Dressing Type Tape;Transparent 8/30/2017 11:00 AM   Hub Color/Line Status Pink;Capped 8/30/2017 11:00 AM        Opportunity for questions and clarification was provided.       Patient transported with:   O2 @ 3 liters  Tech and all questions answered.      TECHNIQUE:      I spoke with the patient in the preoperative holding area, where again risks, benefits, indications, and alternatives were reviewed, informed consent was obtained and all questions were answered.  Patient was then transferred to the operative suite, where she was identified and procedure was confirmed. She was then placed under MAC and moved into the prone position per OR protocol with pillows placed under the lower abdomen to flatten out the sacrum, and also under the shins.  The toes were allowed to dangle freely.  At this time, patient was prepared with chlorhexidine preparation and draped in the appropriate sterile fashion.      Next, a C-arm was moved into the AP position to provide fluoroscopic guidance of the sacrum.  The medial edges of the foramina were identified and marked.  C-arm was then moved into the lateral position to identify the S3 foramina.  Once the initial needle entry point was determined, local injection of plain marcaine was administered bilaterally.  A 3-1/2 inch needle was then advanced into the left S3 foramen with fluoroscopic guidance until the superomedial aspect of the S3 foramen was identified.  Proper S3 needle location was confirmed by direct visualization on lifting of the perineum, or bellowing, and plantar flexion of the great toe after a mini hook was connected to the external position test stimulator. This was repeated with a different needle on the contralateral side. The right side had the best responses (good response but pain on the left) at the lowest stimulation, best positioning, and was chosen for implant. The foramen needle stylet was then removed and a directional guidewire was placed through the needle, using markers to guide and ensure the appropriate depth.  The foramen needle was then removed by sliding over the directional guide.  A small incision was made peripherally to the direction of the guidewire with the  scalpel.  The lead introducer with dilator was then placed over the directional guidewire up to the second black line, utilizing fluoroscopic guidance, such that the radiopaque sebastian was senior care through the S3 foramen.  The dilator was then removed, along with the directional guide and, using fluoroscopy, the tined lead was initially placed with the curved stylet through the introducer, until electrodes 2 and 3 straddled the anterior surface of the sacrum. Stimulation was applied to all 4 electrodes, which were tested, and we observed the anup and plantar flexion for electrodes 0, 1, 2 and 3 at amplitudes less than 1.5 mA. The introducer was then retracted over the lead under continuous fluoroscopy, deploying the tines into the presacral tissue. Re-testing of all 4 electrodes confirmed the appropriate responses. A lateral xray was performed which demonstrated correct position with lateral curve of the leads.     At this time, the neurostimulator IPG site was identified below the left iliac crest and lateral to the sacrum.  Additional local anesthetic with plain lidocaine with marcaine was administered, and a 4 cm incision was made into the subcutaneous tissue, and then using a hemostat, a small pocket was created at this connection site.  Excellent hemostasis was achieved.  A tunneling tool with a sheath was then placed from the lead exit site subcutaneously to the small incised pocket site.  The tunneling tool was then removed and the lead was fed through the sheath, exiting at the pocket site.  The sheath was removed and the lead was dried and cleaned.  The lead was then inserted into the IPG and set screw tightened with the Hex wrench.  The connection components were placed into the incised pocket, and the incisions of the pocket were irrigated with bacitracin in sterile water.  The pocket was then closed with deep interrupted sutures of 3-0 Vicryl in 2 layers and the skin was reapproximated with 4-0 monocryl   and then with dermabond. The lead entry site was also closed with suture and steri strips.     Instrument, lap, and needle counts were correct at the completion of the procedure.The patient tolerated the procedure well.  She was then placed in the supine position and transferred to the postanesthesia care unit awake and in stable condition.    Using the external test stimulator, the patient was programmed to the electrode of optimum sensation and provided utilization instructions prior to discharge. The programming parameters that the patient was programmed to:  Program 1, 0(+)3(-) set to 0.3mA, with backup program 2, 1(-)3(+) set to 0.6mA.  The patient reported appropriate sensory responses, felt in the ipsilateral vagina, and again instructed for use.          Edward Bailey MD, FACOG  Urogynecology and Reconstructive Pelvic Surgery  Department of Obstetrics and Gynecology  [facility]  Critical access hospital

## (undated) DEVICE — 3.0MM PRECISION NEURO (MATCH HEAD)

## (undated) DEVICE — INTENDED FOR TISSUE SEPARATION, AND OTHER PROCEDURES THAT REQUIRE A SHARP SURGICAL BLADE TO PUNCTURE OR CUT.: Brand: BARD-PARKER SAFETY BLADES SIZE 10, STERILE

## (undated) DEVICE — STERILE LATEX POWDER-FREE SURGICAL GLOVESWITH NITRILE COATING: Brand: PROTEXIS

## (undated) DEVICE — DERMABOND SKIN ADH 0.7ML -- DERMABOND ADVANCED 12/BX

## (undated) DEVICE — Device

## (undated) DEVICE — INSULATED BLADE ELECTRODE: Brand: EDGE

## (undated) DEVICE — GAUZE SPONGES,USP TYPE VII GAUZE, 12 PLY: Brand: CURITY

## (undated) DEVICE — KENDALL SCD EXPRESS SLEEVES, KNEE LENGTH, MEDIUM: Brand: KENDALL SCD

## (undated) DEVICE — FLEX ADVANTAGE 3000CC: Brand: FLEX ADVANTAGE

## (undated) DEVICE — PACKING 8004000 NEURAY 200PK 13X13MM: Brand: NEURAY ®

## (undated) DEVICE — SPONGE DISSECT PNUT SM 3/8IN -- 5/PK

## (undated) DEVICE — WATERPROOF, BACTERIA PROOF DRESSING WITH ABSORBENT SEE THROUGH PAD: Brand: OPSITE POST-OP VISIBLE 10X8CM CTN 20

## (undated) DEVICE — REM POLYHESIVE ADULT PATIENT RETURN ELECTRODE: Brand: VALLEYLAB

## (undated) DEVICE — APPLIER CLP AUTO MED 9.75 IN TI SURGCLP SUPER INTLOK 20 DISP

## (undated) DEVICE — 10FR FRAZIER SUCTION HANDLE: Brand: CARDINAL HEALTH

## (undated) DEVICE — STOCKING COMPR M L29-31IN REG 19MMHG ANK 8-9IN CALF 12-15IN

## (undated) DEVICE — BIT DRL DIA2MM STP L14MM QUIK CPL REUSE

## (undated) DEVICE — SUTURE MCRYL SZ 4-0 L18IN ABSRB UD L19MM PS-2 3/8 CIR PRIM Y496G

## (undated) DEVICE — 3M™ BAIR PAWS FLEX™ WARMING GOWN, STANDARD, 20 PER CASE 81003: Brand: BAIR PAWS™

## (undated) DEVICE — TELFA NON-ADHERENT PADS PREPAK: Brand: TELFA

## (undated) DEVICE — (D)PREP SKN CHLRAPRP APPL 26ML -- CONVERT TO ITEM 371833

## (undated) DEVICE — KIT CLN UP BON SECOURS MARYV

## (undated) DEVICE — GOWN,REINFORCED,POLY,AURORA,XLARGE,STRL: Brand: MEDLINE

## (undated) DEVICE — SSC BONE WAX: Brand: SSC BONE WAX

## (undated) DEVICE — SCREW EXT FIX L14MM FOR DISTRCTN

## (undated) DEVICE — 3M™ TEGADERM™ TRANSPARENT FILM DRESSING FRAME STYLE, 1626W, 4 IN X 4-3/4 IN (10 CM X 12 CM), 50/CT 4CT/CASE: Brand: 3M™ TEGADERM™

## (undated) DEVICE — SYRINGE MED 3ML NDL 22GA L1 1/2IN REG BVL SFGLDE

## (undated) DEVICE — SOLUTION IV 1000ML 0.9% SOD CHL

## (undated) DEVICE — SUTURE VCRL SZ 3-0 L27IN ABSRB UD L26MM SH 1/2 CIR J416H